# Patient Record
Sex: MALE | Race: WHITE | NOT HISPANIC OR LATINO | Employment: OTHER | URBAN - NONMETROPOLITAN AREA
[De-identification: names, ages, dates, MRNs, and addresses within clinical notes are randomized per-mention and may not be internally consistent; named-entity substitution may affect disease eponyms.]

---

## 2018-01-02 ENCOUNTER — OFFICE VISIT CONVERTED (OUTPATIENT)
Dept: FAMILY MEDICINE CLINIC | Facility: CLINIC | Age: 83
End: 2018-01-02
Attending: NURSE PRACTITIONER

## 2018-01-02 ENCOUNTER — CONVERSION ENCOUNTER (OUTPATIENT)
Dept: FAMILY MEDICINE CLINIC | Facility: CLINIC | Age: 83
End: 2018-01-02

## 2018-02-06 ENCOUNTER — OFFICE VISIT CONVERTED (OUTPATIENT)
Dept: PODIATRY | Facility: CLINIC | Age: 83
End: 2018-02-06
Attending: PODIATRIST

## 2018-02-22 ENCOUNTER — OFFICE VISIT CONVERTED (OUTPATIENT)
Dept: FAMILY MEDICINE CLINIC | Facility: CLINIC | Age: 83
End: 2018-02-22
Attending: NURSE PRACTITIONER

## 2018-02-28 ENCOUNTER — OFFICE VISIT CONVERTED (OUTPATIENT)
Dept: CARDIOLOGY | Facility: CLINIC | Age: 83
End: 2018-02-28
Attending: INTERNAL MEDICINE

## 2018-04-26 ENCOUNTER — OFFICE VISIT CONVERTED (OUTPATIENT)
Dept: FAMILY MEDICINE CLINIC | Facility: CLINIC | Age: 83
End: 2018-04-26
Attending: NURSE PRACTITIONER

## 2018-04-26 ENCOUNTER — CONVERSION ENCOUNTER (OUTPATIENT)
Dept: FAMILY MEDICINE CLINIC | Facility: CLINIC | Age: 83
End: 2018-04-26

## 2018-05-01 ENCOUNTER — CONVERSION ENCOUNTER (OUTPATIENT)
Dept: PODIATRY | Facility: CLINIC | Age: 83
End: 2018-05-01

## 2018-05-01 ENCOUNTER — PROCEDURE VISIT CONVERTED (OUTPATIENT)
Dept: PODIATRY | Facility: CLINIC | Age: 83
End: 2018-05-01
Attending: PODIATRIST

## 2018-05-03 ENCOUNTER — OFFICE VISIT CONVERTED (OUTPATIENT)
Dept: FAMILY MEDICINE CLINIC | Facility: CLINIC | Age: 83
End: 2018-05-03
Attending: NURSE PRACTITIONER

## 2018-05-14 ENCOUNTER — OFFICE VISIT CONVERTED (OUTPATIENT)
Dept: FAMILY MEDICINE CLINIC | Facility: CLINIC | Age: 83
End: 2018-05-14
Attending: NURSE PRACTITIONER

## 2018-05-21 ENCOUNTER — OFFICE VISIT CONVERTED (OUTPATIENT)
Dept: FAMILY MEDICINE CLINIC | Facility: CLINIC | Age: 83
End: 2018-05-21
Attending: NURSE PRACTITIONER

## 2018-05-21 ENCOUNTER — CONVERSION ENCOUNTER (OUTPATIENT)
Dept: FAMILY MEDICINE CLINIC | Facility: CLINIC | Age: 83
End: 2018-05-21

## 2018-06-18 ENCOUNTER — OFFICE VISIT CONVERTED (OUTPATIENT)
Dept: FAMILY MEDICINE CLINIC | Facility: CLINIC | Age: 83
End: 2018-06-18
Attending: NURSE PRACTITIONER

## 2018-07-02 ENCOUNTER — OFFICE VISIT CONVERTED (OUTPATIENT)
Dept: FAMILY MEDICINE CLINIC | Facility: CLINIC | Age: 83
End: 2018-07-02
Attending: NURSE PRACTITIONER

## 2018-07-09 ENCOUNTER — CONVERSION ENCOUNTER (OUTPATIENT)
Dept: FAMILY MEDICINE CLINIC | Facility: CLINIC | Age: 83
End: 2018-07-09

## 2018-07-09 ENCOUNTER — OFFICE VISIT CONVERTED (OUTPATIENT)
Dept: FAMILY MEDICINE CLINIC | Facility: CLINIC | Age: 83
End: 2018-07-09
Attending: NURSE PRACTITIONER

## 2018-07-30 ENCOUNTER — OFFICE VISIT CONVERTED (OUTPATIENT)
Dept: CARDIOLOGY | Facility: CLINIC | Age: 83
End: 2018-07-30
Attending: INTERNAL MEDICINE

## 2018-09-19 ENCOUNTER — OFFICE VISIT CONVERTED (OUTPATIENT)
Dept: UROLOGY | Facility: CLINIC | Age: 83
End: 2018-09-19
Attending: UROLOGY

## 2018-10-04 ENCOUNTER — CONVERSION ENCOUNTER (OUTPATIENT)
Dept: CARDIOLOGY | Facility: CLINIC | Age: 83
End: 2018-10-04

## 2018-10-04 ENCOUNTER — OFFICE VISIT CONVERTED (OUTPATIENT)
Dept: CARDIOLOGY | Facility: CLINIC | Age: 83
End: 2018-10-04
Attending: NURSE PRACTITIONER

## 2018-10-29 ENCOUNTER — OFFICE VISIT CONVERTED (OUTPATIENT)
Dept: SURGERY | Facility: CLINIC | Age: 83
End: 2018-10-29
Attending: PHYSICIAN ASSISTANT

## 2018-10-29 ENCOUNTER — CONVERSION ENCOUNTER (OUTPATIENT)
Dept: SURGERY | Facility: CLINIC | Age: 83
End: 2018-10-29

## 2018-11-01 ENCOUNTER — CONVERSION ENCOUNTER (OUTPATIENT)
Dept: CARDIOLOGY | Facility: CLINIC | Age: 83
End: 2018-11-01
Attending: INTERNAL MEDICINE

## 2018-11-14 ENCOUNTER — OFFICE VISIT CONVERTED (OUTPATIENT)
Dept: FAMILY MEDICINE CLINIC | Facility: CLINIC | Age: 83
End: 2018-11-14
Attending: NURSE PRACTITIONER

## 2018-11-19 ENCOUNTER — OFFICE VISIT CONVERTED (OUTPATIENT)
Dept: SURGERY | Facility: CLINIC | Age: 83
End: 2018-11-19
Attending: PHYSICIAN ASSISTANT

## 2018-11-19 ENCOUNTER — CONVERSION ENCOUNTER (OUTPATIENT)
Dept: SURGERY | Facility: CLINIC | Age: 83
End: 2018-11-19

## 2018-11-20 ENCOUNTER — CONVERSION ENCOUNTER (OUTPATIENT)
Dept: MAMMOGRAPHY | Facility: HOSPITAL | Age: 83
End: 2018-11-20

## 2018-12-18 ENCOUNTER — OFFICE VISIT CONVERTED (OUTPATIENT)
Dept: FAMILY MEDICINE CLINIC | Facility: CLINIC | Age: 83
End: 2018-12-18
Attending: NURSE PRACTITIONER

## 2018-12-18 ENCOUNTER — CONVERSION ENCOUNTER (OUTPATIENT)
Dept: FAMILY MEDICINE CLINIC | Facility: CLINIC | Age: 83
End: 2018-12-18

## 2019-01-22 ENCOUNTER — CONVERSION ENCOUNTER (OUTPATIENT)
Dept: SURGERY | Facility: CLINIC | Age: 84
End: 2019-01-22

## 2019-01-22 ENCOUNTER — HOSPITAL ENCOUNTER (OUTPATIENT)
Dept: SURGERY | Facility: CLINIC | Age: 84
Discharge: HOME | End: 2019-01-22
Attending: PHYSICIAN ASSISTANT

## 2019-01-22 ENCOUNTER — OFFICE VISIT CONVERTED (OUTPATIENT)
Dept: SURGERY | Facility: CLINIC | Age: 84
End: 2019-01-22
Attending: PHYSICIAN ASSISTANT

## 2019-01-24 ENCOUNTER — PROCEDURE VISIT CONVERTED (OUTPATIENT)
Dept: PODIATRY | Facility: CLINIC | Age: 84
End: 2019-01-24
Attending: PODIATRIST

## 2019-01-24 LAB — BACTERIA UR CULT: NORMAL

## 2019-02-06 ENCOUNTER — HOSPITAL ENCOUNTER (OUTPATIENT)
Dept: OTHER | Facility: HOSPITAL | Age: 84
Discharge: HOME | End: 2019-02-06
Attending: PHYSICIAN ASSISTANT

## 2019-02-06 LAB
APPEARANCE UR: CLEAR
BILIRUB UR QL: NEGATIVE
COLOR UR: YELLOW
CONV BACTERIA: NEGATIVE
CONV COLLECTION SOURCE (UA): ABNORMAL
CONV UROBILINOGEN IN URINE BY AUTOMATED TEST STRIP: 0.2 {EHRLICHU}/DL (ref 0.1–1)
GLUCOSE UR QL: NEGATIVE MG/DL
HGB UR QL STRIP: ABNORMAL
KETONES UR QL STRIP: NEGATIVE MG/DL
LEUKOCYTE ESTERASE UR QL STRIP: NEGATIVE
NITRITE UR QL STRIP: NEGATIVE
PH UR STRIP.AUTO: 6 [PH] (ref 5–8)
PROT UR QL: 30 MG/DL
RBC #/AREA URNS HPF: ABNORMAL /[HPF]
SP GR UR: 1.02 (ref 1–1.03)
SQUAMOUS SPT QL MICRO: ABNORMAL /[HPF]
WBC #/AREA URNS HPF: ABNORMAL /[HPF]

## 2019-02-12 ENCOUNTER — HOSPITAL ENCOUNTER (OUTPATIENT)
Dept: OTHER | Facility: HOSPITAL | Age: 84
Discharge: HOME | End: 2019-02-12
Attending: INTERNAL MEDICINE

## 2019-02-12 LAB
25(OH)D3 SERPL-MCNC: 38.9 NG/ML (ref 30–100)
CALCIUM SERPL-MCNC: 9 MG/DL (ref 8.7–10.4)
CREAT UR-MCNC: 0.9 MG/DL (ref 0.7–1.2)

## 2019-02-13 ENCOUNTER — OFFICE VISIT CONVERTED (OUTPATIENT)
Dept: FAMILY MEDICINE CLINIC | Facility: CLINIC | Age: 84
End: 2019-02-13
Attending: FAMILY MEDICINE

## 2019-02-13 ENCOUNTER — CONVERSION ENCOUNTER (OUTPATIENT)
Dept: FAMILY MEDICINE CLINIC | Facility: CLINIC | Age: 84
End: 2019-02-13

## 2019-03-14 ENCOUNTER — OFFICE VISIT CONVERTED (OUTPATIENT)
Dept: FAMILY MEDICINE CLINIC | Facility: CLINIC | Age: 84
End: 2019-03-14
Attending: FAMILY MEDICINE

## 2019-04-11 ENCOUNTER — CONVERSION ENCOUNTER (OUTPATIENT)
Dept: SURGERY | Facility: CLINIC | Age: 84
End: 2019-04-11

## 2019-04-11 ENCOUNTER — HOSPITAL ENCOUNTER (OUTPATIENT)
Dept: SURGERY | Facility: CLINIC | Age: 84
Discharge: HOME | End: 2019-04-11
Attending: PHYSICIAN ASSISTANT

## 2019-04-11 ENCOUNTER — PROCEDURE VISIT CONVERTED (OUTPATIENT)
Dept: PODIATRY | Facility: CLINIC | Age: 84
End: 2019-04-11
Attending: PODIATRIST

## 2019-04-11 ENCOUNTER — OFFICE VISIT CONVERTED (OUTPATIENT)
Dept: SURGERY | Facility: CLINIC | Age: 84
End: 2019-04-11
Attending: PHYSICIAN ASSISTANT

## 2019-04-13 LAB — BACTERIA UR CULT: NORMAL

## 2019-04-25 ENCOUNTER — HOSPITAL ENCOUNTER (OUTPATIENT)
Dept: GENERAL RADIOLOGY | Facility: HOSPITAL | Age: 84
Discharge: HOME | End: 2019-04-25
Attending: PHYSICIAN ASSISTANT

## 2019-04-25 LAB
CREAT BLD-MCNC: 1 MG/DL (ref 0.6–1.4)
GFR SERPLBLD BASED ON 1.73 SQ M-ARVRAT: >60 ML/MIN/{1.73_M2}

## 2019-04-29 ENCOUNTER — HOSPITAL ENCOUNTER (OUTPATIENT)
Dept: OTHER | Facility: HOSPITAL | Age: 84
Discharge: HOME | End: 2019-04-29
Attending: NURSE PRACTITIONER

## 2019-04-29 LAB
ALBUMIN SERPL-MCNC: 4.1 G/DL (ref 3.5–5)
ALBUMIN/GLOB SERPL: 1.7 {RATIO} (ref 1.4–2.6)
ALP SERPL-CCNC: 50 U/L (ref 38–185)
ALT SERPL-CCNC: 8 U/L (ref 10–40)
ANION GAP SERPL CALC-SCNC: 16 MMOL/L (ref 8–19)
AST SERPL-CCNC: 22 U/L (ref 15–50)
BASOPHILS # BLD AUTO: 0.07 10*3/UL (ref 0–0.2)
BASOPHILS NFR BLD AUTO: 0.9 % (ref 0–3)
BILIRUB SERPL-MCNC: 0.63 MG/DL (ref 0.2–1.3)
BUN SERPL-MCNC: 24 MG/DL (ref 5–25)
BUN/CREAT SERPL: 25 {RATIO} (ref 6–20)
CALCIUM SERPL-MCNC: 9.1 MG/DL (ref 8.7–10.4)
CHLORIDE SERPL-SCNC: 107 MMOL/L (ref 99–111)
CONV ABS IMM GRAN: 0.02 10*3/UL (ref 0–0.2)
CONV CO2: 26 MMOL/L (ref 22–32)
CONV IMMATURE GRAN: 0.3 % (ref 0–1.8)
CONV TOTAL PROTEIN: 6.5 G/DL (ref 6.3–8.2)
CREAT UR-MCNC: 0.97 MG/DL (ref 0.7–1.2)
DEPRECATED RDW RBC AUTO: 53.5 FL (ref 35.1–43.9)
EOSINOPHIL # BLD AUTO: 0.29 10*3/UL (ref 0–0.7)
EOSINOPHIL # BLD AUTO: 3.8 % (ref 0–7)
ERYTHROCYTE [DISTWIDTH] IN BLOOD BY AUTOMATED COUNT: 14.6 % (ref 11.6–14.4)
GFR SERPLBLD BASED ON 1.73 SQ M-ARVRAT: >60 ML/MIN/{1.73_M2}
GLOBULIN UR ELPH-MCNC: 2.4 G/DL (ref 2–3.5)
GLUCOSE SERPL-MCNC: 103 MG/DL (ref 70–99)
HBA1C MFR BLD: 14.2 G/DL (ref 14–18)
HCT VFR BLD AUTO: 44.8 % (ref 42–52)
LYMPHOCYTES # BLD AUTO: 2.74 10*3/UL (ref 1–5)
MCH RBC QN AUTO: 31.5 PG (ref 27–31)
MCHC RBC AUTO-ENTMCNC: 31.7 G/DL (ref 33–37)
MCV RBC AUTO: 99.3 FL (ref 80–96)
MONOCYTES # BLD AUTO: 1.01 10*3/UL (ref 0.2–1.2)
MONOCYTES NFR BLD AUTO: 13.2 % (ref 3–10)
NEUTROPHILS # BLD AUTO: 3.53 10*3/UL (ref 2–8)
NEUTROPHILS NFR BLD AUTO: 46 % (ref 30–85)
NRBC CBCN: 0 % (ref 0–0.7)
OSMOLALITY SERPL CALC.SUM OF ELEC: 302 MOSM/KG (ref 273–304)
PLATELET # BLD AUTO: 170 10*3/UL (ref 130–400)
PMV BLD AUTO: 11.1 FL (ref 9.4–12.4)
POTASSIUM SERPL-SCNC: 4.9 MMOL/L (ref 3.5–5.3)
RBC # BLD AUTO: 4.51 10*6/UL (ref 4.7–6.1)
SODIUM SERPL-SCNC: 144 MMOL/L (ref 135–147)
VARIANT LYMPHS NFR BLD MANUAL: 35.8 % (ref 20–45)
WBC # BLD AUTO: 7.66 10*3/UL (ref 4.8–10.8)

## 2019-05-09 ENCOUNTER — OFFICE VISIT CONVERTED (OUTPATIENT)
Dept: CARDIOLOGY | Facility: CLINIC | Age: 84
End: 2019-05-09
Attending: INTERNAL MEDICINE

## 2019-05-15 ENCOUNTER — PROCEDURE VISIT CONVERTED (OUTPATIENT)
Dept: UROLOGY | Facility: CLINIC | Age: 84
End: 2019-05-15
Attending: UROLOGY

## 2019-05-30 ENCOUNTER — HOSPITAL ENCOUNTER (OUTPATIENT)
Dept: OTHER | Facility: HOSPITAL | Age: 84
Discharge: HOME | End: 2019-05-30
Attending: INTERNAL MEDICINE

## 2019-05-30 LAB
25(OH)D3 SERPL-MCNC: 40 NG/ML (ref 30–100)
CALCIUM SERPL-MCNC: 8.9 MG/DL (ref 8.7–10.4)
CREAT UR-MCNC: 1.14 MG/DL (ref 0.7–1.2)

## 2019-06-13 ENCOUNTER — HOSPITAL ENCOUNTER (OUTPATIENT)
Dept: INFUSION THERAPY | Facility: HOSPITAL | Age: 84
Discharge: HOME | End: 2019-06-13
Attending: INTERNAL MEDICINE

## 2019-08-07 ENCOUNTER — HOSPITAL ENCOUNTER (OUTPATIENT)
Dept: OTHER | Facility: HOSPITAL | Age: 84
Discharge: HOME | End: 2019-08-07
Attending: INTERNAL MEDICINE

## 2019-08-07 LAB
ANION GAP SERPL CALC-SCNC: 21 MMOL/L (ref 8–19)
BUN SERPL-MCNC: 18 MG/DL (ref 5–25)
BUN/CREAT SERPL: 17 {RATIO} (ref 6–20)
CALCIUM SERPL-MCNC: 9.1 MG/DL (ref 8.7–10.4)
CHLORIDE SERPL-SCNC: 108 MMOL/L (ref 99–111)
CONV CO2: 22 MMOL/L (ref 22–32)
CREAT UR-MCNC: 1.04 MG/DL (ref 0.7–1.2)
GFR SERPLBLD BASED ON 1.73 SQ M-ARVRAT: >60 ML/MIN/{1.73_M2}
GLUCOSE SERPL-MCNC: 103 MG/DL (ref 70–99)
OSMOLALITY SERPL CALC.SUM OF ELEC: 304 MOSM/KG (ref 273–304)
POTASSIUM SERPL-SCNC: 4.6 MMOL/L (ref 3.5–5.3)
SODIUM SERPL-SCNC: 146 MMOL/L (ref 135–147)

## 2019-09-16 ENCOUNTER — HOSPITAL ENCOUNTER (OUTPATIENT)
Dept: OTHER | Facility: HOSPITAL | Age: 84
Discharge: HOME | End: 2019-09-16
Attending: INTERNAL MEDICINE

## 2019-09-16 LAB
25(OH)D3 SERPL-MCNC: 35.8 NG/ML (ref 30–100)
CALCIUM SERPL-MCNC: 9.1 MG/DL (ref 8.7–10.4)

## 2019-09-19 ENCOUNTER — HOSPITAL ENCOUNTER (OUTPATIENT)
Dept: OTHER | Facility: HOSPITAL | Age: 84
Discharge: HOME | End: 2019-09-19
Attending: UROLOGY

## 2019-09-19 LAB — PSA SERPL-MCNC: <0.01 NG/ML (ref 0–4)

## 2019-09-23 ENCOUNTER — PROCEDURE VISIT CONVERTED (OUTPATIENT)
Dept: UROLOGY | Facility: CLINIC | Age: 84
End: 2019-09-23
Attending: UROLOGY

## 2019-09-24 ENCOUNTER — OFFICE VISIT CONVERTED (OUTPATIENT)
Dept: SURGERY | Facility: CLINIC | Age: 84
End: 2019-09-24
Attending: PHYSICIAN ASSISTANT

## 2019-09-24 ENCOUNTER — CONVERSION ENCOUNTER (OUTPATIENT)
Dept: SURGERY | Facility: CLINIC | Age: 84
End: 2019-09-24

## 2019-10-07 ENCOUNTER — OFFICE VISIT CONVERTED (OUTPATIENT)
Dept: FAMILY MEDICINE CLINIC | Facility: CLINIC | Age: 84
End: 2019-10-07
Attending: FAMILY MEDICINE

## 2019-10-07 ENCOUNTER — CONVERSION ENCOUNTER (OUTPATIENT)
Dept: FAMILY MEDICINE CLINIC | Facility: CLINIC | Age: 84
End: 2019-10-07

## 2019-11-18 ENCOUNTER — HOSPITAL ENCOUNTER (OUTPATIENT)
Dept: OTHER | Facility: HOSPITAL | Age: 84
Discharge: HOME | End: 2019-11-18
Attending: NURSE PRACTITIONER

## 2019-11-18 LAB
ANION GAP SERPL CALC-SCNC: 15 MMOL/L (ref 8–19)
BASOPHILS # BLD AUTO: 0.05 10*3/UL (ref 0–0.2)
BASOPHILS NFR BLD AUTO: 0.6 % (ref 0–3)
BUN SERPL-MCNC: 18 MG/DL (ref 5–25)
BUN/CREAT SERPL: 18 {RATIO} (ref 6–20)
CALCIUM SERPL-MCNC: 8.8 MG/DL (ref 8.7–10.4)
CHLORIDE SERPL-SCNC: 101 MMOL/L (ref 99–111)
CONV ABS IMM GRAN: 0.02 10*3/UL (ref 0–0.2)
CONV CO2: 28 MMOL/L (ref 22–32)
CONV IMMATURE GRAN: 0.2 % (ref 0–1.8)
CREAT UR-MCNC: 1.01 MG/DL (ref 0.7–1.2)
DEPRECATED RDW RBC AUTO: 50 FL (ref 35.1–43.9)
EOSINOPHIL # BLD AUTO: 0.24 10*3/UL (ref 0–0.7)
EOSINOPHIL # BLD AUTO: 3 % (ref 0–7)
ERYTHROCYTE [DISTWIDTH] IN BLOOD BY AUTOMATED COUNT: 13.3 % (ref 11.6–14.4)
GFR SERPLBLD BASED ON 1.73 SQ M-ARVRAT: >60 ML/MIN/{1.73_M2}
GLUCOSE SERPL-MCNC: 91 MG/DL (ref 70–99)
HCT VFR BLD AUTO: 47.1 % (ref 42–52)
HGB BLD-MCNC: 14.5 G/DL (ref 14–18)
LYMPHOCYTES # BLD AUTO: 2.95 10*3/UL (ref 1–5)
LYMPHOCYTES NFR BLD AUTO: 36.6 % (ref 20–45)
MCH RBC QN AUTO: 31.3 PG (ref 27–31)
MCHC RBC AUTO-ENTMCNC: 30.8 G/DL (ref 33–37)
MCV RBC AUTO: 101.7 FL (ref 80–96)
MONOCYTES # BLD AUTO: 1.04 10*3/UL (ref 0.2–1.2)
MONOCYTES NFR BLD AUTO: 12.9 % (ref 3–10)
NEUTROPHILS # BLD AUTO: 3.75 10*3/UL (ref 2–8)
NEUTROPHILS NFR BLD AUTO: 46.7 % (ref 30–85)
NRBC CBCN: 0 % (ref 0–0.7)
OSMOLALITY SERPL CALC.SUM OF ELEC: 291 MOSM/KG (ref 273–304)
PLATELET # BLD AUTO: 173 10*3/UL (ref 130–400)
PMV BLD AUTO: 10.2 FL (ref 9.4–12.4)
POTASSIUM SERPL-SCNC: 3.8 MMOL/L (ref 3.5–5.3)
RBC # BLD AUTO: 4.63 10*6/UL (ref 4.7–6.1)
SODIUM SERPL-SCNC: 140 MMOL/L (ref 135–147)
WBC # BLD AUTO: 8.05 10*3/UL (ref 4.8–10.8)

## 2019-11-25 ENCOUNTER — CONVERSION ENCOUNTER (OUTPATIENT)
Dept: CARDIOLOGY | Facility: CLINIC | Age: 84
End: 2019-11-25

## 2019-11-25 ENCOUNTER — OFFICE VISIT CONVERTED (OUTPATIENT)
Dept: CARDIOLOGY | Facility: CLINIC | Age: 84
End: 2019-11-25
Attending: NURSE PRACTITIONER

## 2019-12-07 ENCOUNTER — HOSPITAL ENCOUNTER (OUTPATIENT)
Dept: OTHER | Facility: HOSPITAL | Age: 84
Discharge: HOME | End: 2019-12-07
Attending: INTERNAL MEDICINE

## 2019-12-07 LAB
25(OH)D3 SERPL-MCNC: 33.5 NG/ML (ref 30–100)
CALCIUM SERPL-MCNC: 8.8 MG/DL (ref 8.7–10.4)
CREAT UR-MCNC: 1.11 MG/DL (ref 0.7–1.2)

## 2019-12-10 ENCOUNTER — PROCEDURE VISIT CONVERTED (OUTPATIENT)
Dept: PODIATRY | Facility: CLINIC | Age: 84
End: 2019-12-10
Attending: PODIATRIST

## 2019-12-20 ENCOUNTER — HOSPITAL ENCOUNTER (OUTPATIENT)
Dept: INFUSION THERAPY | Facility: HOSPITAL | Age: 84
Discharge: HOME | End: 2019-12-20
Attending: INTERNAL MEDICINE

## 2020-01-07 ENCOUNTER — CONVERSION ENCOUNTER (OUTPATIENT)
Dept: FAMILY MEDICINE CLINIC | Facility: CLINIC | Age: 85
End: 2020-01-07

## 2020-01-07 ENCOUNTER — OFFICE VISIT CONVERTED (OUTPATIENT)
Dept: FAMILY MEDICINE CLINIC | Facility: CLINIC | Age: 85
End: 2020-01-07
Attending: FAMILY MEDICINE

## 2020-01-10 ENCOUNTER — HOSPITAL ENCOUNTER (OUTPATIENT)
Dept: GENERAL RADIOLOGY | Facility: HOSPITAL | Age: 85
Discharge: HOME | End: 2020-01-10
Attending: FAMILY MEDICINE

## 2020-03-03 ENCOUNTER — PROCEDURE VISIT CONVERTED (OUTPATIENT)
Dept: PODIATRY | Facility: CLINIC | Age: 85
End: 2020-03-03
Attending: PODIATRIST

## 2020-03-03 ENCOUNTER — HOSPITAL ENCOUNTER (OUTPATIENT)
Dept: OTHER | Facility: HOSPITAL | Age: 85
Discharge: HOME | End: 2020-03-03
Attending: INTERNAL MEDICINE

## 2020-03-03 LAB
25(OH)D3 SERPL-MCNC: 37.5 NG/ML (ref 30–100)
CALCIUM SERPL-MCNC: 8.8 MG/DL (ref 8.7–10.4)
CREAT UR-MCNC: 1.24 MG/DL (ref 0.7–1.2)

## 2020-04-13 ENCOUNTER — TELEPHONE CONVERTED (OUTPATIENT)
Dept: FAMILY MEDICINE CLINIC | Facility: CLINIC | Age: 85
End: 2020-04-13
Attending: FAMILY MEDICINE

## 2020-04-13 ENCOUNTER — CONVERSION ENCOUNTER (OUTPATIENT)
Dept: FAMILY MEDICINE CLINIC | Facility: CLINIC | Age: 85
End: 2020-04-13

## 2020-05-22 ENCOUNTER — HOSPITAL ENCOUNTER (OUTPATIENT)
Dept: OTHER | Facility: HOSPITAL | Age: 85
Discharge: HOME | End: 2020-05-22
Attending: NURSE PRACTITIONER

## 2020-05-22 LAB
ALBUMIN SERPL-MCNC: 3.8 G/DL (ref 3.5–5)
ALBUMIN/GLOB SERPL: 1.4 {RATIO} (ref 1.4–2.6)
ALP SERPL-CCNC: 58 U/L (ref 38–185)
ALT SERPL-CCNC: 9 U/L (ref 10–40)
ANION GAP SERPL CALC-SCNC: 15 MMOL/L (ref 8–19)
AST SERPL-CCNC: 23 U/L (ref 15–50)
BASOPHILS # BLD AUTO: 0.06 10*3/UL (ref 0–0.2)
BASOPHILS NFR BLD AUTO: 0.7 % (ref 0–3)
BILIRUB SERPL-MCNC: 0.43 MG/DL (ref 0.2–1.3)
BUN SERPL-MCNC: 18 MG/DL (ref 5–25)
BUN/CREAT SERPL: 15 {RATIO} (ref 6–20)
CALCIUM SERPL-MCNC: 9.2 MG/DL (ref 8.7–10.4)
CHLORIDE SERPL-SCNC: 106 MMOL/L (ref 99–111)
CHOLEST SERPL-MCNC: 122 MG/DL (ref 107–200)
CHOLEST/HDLC SERPL: 2.7 {RATIO} (ref 3–6)
CONV ABS IMM GRAN: 0.02 10*3/UL (ref 0–0.2)
CONV CO2: 26 MMOL/L (ref 22–32)
CONV IMMATURE GRAN: 0.2 % (ref 0–1.8)
CONV TOTAL PROTEIN: 6.5 G/DL (ref 6.3–8.2)
CREAT UR-MCNC: 1.22 MG/DL (ref 0.7–1.2)
DEPRECATED RDW RBC AUTO: 49.3 FL (ref 35.1–43.9)
EOSINOPHIL # BLD AUTO: 0.29 10*3/UL (ref 0–0.7)
EOSINOPHIL # BLD AUTO: 3.5 % (ref 0–7)
ERYTHROCYTE [DISTWIDTH] IN BLOOD BY AUTOMATED COUNT: 13.5 % (ref 11.6–14.4)
GFR SERPLBLD BASED ON 1.73 SQ M-ARVRAT: 51 ML/MIN/{1.73_M2}
GLOBULIN UR ELPH-MCNC: 2.7 G/DL (ref 2–3.5)
GLUCOSE SERPL-MCNC: 100 MG/DL (ref 70–99)
HCT VFR BLD AUTO: 46 % (ref 42–52)
HDLC SERPL-MCNC: 46 MG/DL (ref 40–60)
HGB BLD-MCNC: 14.6 G/DL (ref 14–18)
LDLC SERPL CALC-MCNC: 65 MG/DL (ref 70–100)
LYMPHOCYTES # BLD AUTO: 2.44 10*3/UL (ref 1–5)
LYMPHOCYTES NFR BLD AUTO: 29.7 % (ref 20–45)
MCH RBC QN AUTO: 31 PG (ref 27–31)
MCHC RBC AUTO-ENTMCNC: 31.7 G/DL (ref 33–37)
MCV RBC AUTO: 97.7 FL (ref 80–96)
MONOCYTES # BLD AUTO: 1 10*3/UL (ref 0.2–1.2)
MONOCYTES NFR BLD AUTO: 12.2 % (ref 3–10)
NEUTROPHILS # BLD AUTO: 4.41 10*3/UL (ref 2–8)
NEUTROPHILS NFR BLD AUTO: 53.7 % (ref 30–85)
NRBC CBCN: 0 % (ref 0–0.7)
OSMOLALITY SERPL CALC.SUM OF ELEC: 298 MOSM/KG (ref 273–304)
PLATELET # BLD AUTO: 168 10*3/UL (ref 130–400)
PMV BLD AUTO: 10.2 FL (ref 9.4–12.4)
POTASSIUM SERPL-SCNC: 4.4 MMOL/L (ref 3.5–5.3)
RBC # BLD AUTO: 4.71 10*6/UL (ref 4.7–6.1)
SODIUM SERPL-SCNC: 143 MMOL/L (ref 135–147)
TRIGL SERPL-MCNC: 57 MG/DL (ref 40–150)
VLDLC SERPL-MCNC: 11 MG/DL (ref 5–37)
WBC # BLD AUTO: 8.22 10*3/UL (ref 4.8–10.8)

## 2020-06-03 ENCOUNTER — CONVERSION ENCOUNTER (OUTPATIENT)
Dept: PODIATRY | Facility: CLINIC | Age: 85
End: 2020-06-03

## 2020-06-03 ENCOUNTER — PROCEDURE VISIT CONVERTED (OUTPATIENT)
Dept: PODIATRY | Facility: CLINIC | Age: 85
End: 2020-06-03
Attending: PODIATRIST

## 2020-06-11 ENCOUNTER — OFFICE VISIT CONVERTED (OUTPATIENT)
Dept: CARDIOLOGY | Facility: CLINIC | Age: 85
End: 2020-06-11
Attending: INTERNAL MEDICINE

## 2020-06-16 ENCOUNTER — HOSPITAL ENCOUNTER (OUTPATIENT)
Dept: OTHER | Facility: HOSPITAL | Age: 85
Discharge: HOME | End: 2020-06-16
Attending: INTERNAL MEDICINE

## 2020-06-16 LAB
25(OH)D3 SERPL-MCNC: 41.4 NG/ML (ref 30–100)
CALCIUM SERPL-MCNC: 9.1 MG/DL (ref 8.7–10.4)
CREAT UR-MCNC: 1.22 MG/DL (ref 0.7–1.2)

## 2020-07-06 ENCOUNTER — HOSPITAL ENCOUNTER (OUTPATIENT)
Dept: INFUSION THERAPY | Facility: HOSPITAL | Age: 85
Discharge: HOME | End: 2020-07-06
Attending: INTERNAL MEDICINE

## 2020-07-08 ENCOUNTER — HOSPITAL ENCOUNTER (OUTPATIENT)
Dept: OTHER | Facility: HOSPITAL | Age: 85
Discharge: HOME | End: 2020-07-08
Attending: NURSE PRACTITIONER

## 2020-07-08 LAB
ANION GAP SERPL CALC-SCNC: 12 MMOL/L (ref 8–19)
BUN SERPL-MCNC: 21 MG/DL (ref 5–25)
BUN/CREAT SERPL: 17 {RATIO} (ref 6–20)
CALCIUM SERPL-MCNC: 8.8 MG/DL (ref 8.7–10.4)
CHLORIDE SERPL-SCNC: 104 MMOL/L (ref 99–111)
CONV CO2: 27 MMOL/L (ref 22–32)
CREAT UR-MCNC: 1.25 MG/DL (ref 0.7–1.2)
GFR SERPLBLD BASED ON 1.73 SQ M-ARVRAT: 49 ML/MIN/{1.73_M2}
GLUCOSE SERPL-MCNC: 96 MG/DL (ref 70–99)
OSMOLALITY SERPL CALC.SUM OF ELEC: 291 MOSM/KG (ref 273–304)
POTASSIUM SERPL-SCNC: 4.3 MMOL/L (ref 3.5–5.3)
SODIUM SERPL-SCNC: 139 MMOL/L (ref 135–147)

## 2020-08-26 ENCOUNTER — PROCEDURE VISIT CONVERTED (OUTPATIENT)
Dept: PODIATRY | Facility: CLINIC | Age: 85
End: 2020-08-26
Attending: PODIATRIST

## 2020-08-28 ENCOUNTER — HOSPITAL ENCOUNTER (OUTPATIENT)
Dept: RADIATION ONCOLOGY | Facility: HOSPITAL | Age: 85
Discharge: HOME | End: 2020-08-28
Attending: RADIOLOGY

## 2020-08-28 LAB — PSA SERPL-MCNC: <0.01 NG/ML (ref 0–4)

## 2020-09-08 ENCOUNTER — OFFICE VISIT CONVERTED (OUTPATIENT)
Dept: FAMILY MEDICINE CLINIC | Facility: CLINIC | Age: 85
End: 2020-09-08
Attending: FAMILY MEDICINE

## 2020-09-08 ENCOUNTER — CONVERSION ENCOUNTER (OUTPATIENT)
Dept: FAMILY MEDICINE CLINIC | Facility: CLINIC | Age: 85
End: 2020-09-08

## 2020-09-21 ENCOUNTER — HOSPITAL ENCOUNTER (OUTPATIENT)
Dept: OTHER | Facility: HOSPITAL | Age: 85
Discharge: HOME | End: 2020-09-21
Attending: PHYSICIAN ASSISTANT

## 2020-09-21 LAB — PSA SERPL-MCNC: <0.01 NG/ML (ref 0–4)

## 2020-09-29 ENCOUNTER — TELEPHONE CONVERTED (OUTPATIENT)
Dept: UROLOGY | Facility: CLINIC | Age: 85
End: 2020-09-29
Attending: UROLOGY

## 2020-10-01 ENCOUNTER — HOSPITAL ENCOUNTER (OUTPATIENT)
Dept: SURGERY | Facility: CLINIC | Age: 85
Discharge: HOME | End: 2020-10-01
Attending: UROLOGY

## 2020-10-01 LAB
APPEARANCE UR: CLEAR
BILIRUB UR QL: NEGATIVE
COLOR UR: YELLOW
CONV COLLECTION SOURCE (UA): NORMAL
CONV UROBILINOGEN IN URINE BY AUTOMATED TEST STRIP: 0.2 {EHRLICHU}/DL (ref 0.1–1)
GLUCOSE UR QL: NEGATIVE MG/DL
HGB UR QL STRIP: NEGATIVE
KETONES UR QL STRIP: NEGATIVE MG/DL
LEUKOCYTE ESTERASE UR QL STRIP: NEGATIVE
NITRITE UR QL STRIP: NEGATIVE
PH UR STRIP.AUTO: 6 [PH] (ref 5–8)
PROT UR QL: NEGATIVE MG/DL
SP GR UR: 1.02 (ref 1–1.03)

## 2020-11-18 ENCOUNTER — CONVERSION ENCOUNTER (OUTPATIENT)
Dept: PODIATRY | Facility: CLINIC | Age: 85
End: 2020-11-18

## 2020-11-18 ENCOUNTER — PROCEDURE VISIT CONVERTED (OUTPATIENT)
Dept: PODIATRY | Facility: CLINIC | Age: 85
End: 2020-11-18
Attending: PODIATRIST

## 2020-11-23 ENCOUNTER — HOSPITAL ENCOUNTER (OUTPATIENT)
Dept: MAMMOGRAPHY | Facility: HOSPITAL | Age: 85
Discharge: HOME | End: 2020-11-23
Attending: INTERNAL MEDICINE

## 2021-01-02 ENCOUNTER — HOSPITAL ENCOUNTER (OUTPATIENT)
Dept: OTHER | Facility: HOSPITAL | Age: 86
Discharge: HOME | End: 2021-01-02
Attending: FAMILY MEDICINE

## 2021-01-02 LAB
ALBUMIN SERPL-MCNC: 3.8 G/DL (ref 3.5–5)
ALBUMIN/GLOB SERPL: 1.4 {RATIO} (ref 1.4–2.6)
ALP SERPL-CCNC: 65 U/L (ref 38–185)
ALT SERPL-CCNC: 10 U/L (ref 10–40)
ANION GAP SERPL CALC-SCNC: 16 MMOL/L (ref 8–19)
AST SERPL-CCNC: 23 U/L (ref 15–50)
BASOPHILS # BLD AUTO: 0.08 10*3/UL (ref 0–0.2)
BASOPHILS NFR BLD AUTO: 1 % (ref 0–3)
BILIRUB SERPL-MCNC: 0.37 MG/DL (ref 0.2–1.3)
BUN SERPL-MCNC: 20 MG/DL (ref 5–25)
BUN/CREAT SERPL: 16 {RATIO} (ref 6–20)
CALCIUM SERPL-MCNC: 9.4 MG/DL (ref 8.7–10.4)
CHLORIDE SERPL-SCNC: 105 MMOL/L (ref 99–111)
CHOLEST SERPL-MCNC: 129 MG/DL (ref 107–200)
CHOLEST/HDLC SERPL: 2.6 {RATIO} (ref 3–6)
CONV ABS IMM GRAN: 0.02 10*3/UL (ref 0–0.2)
CONV CO2: 27 MMOL/L (ref 22–32)
CONV IMMATURE GRAN: 0.3 % (ref 0–1.8)
CONV TOTAL PROTEIN: 6.6 G/DL (ref 6.3–8.2)
CREAT UR-MCNC: 1.27 MG/DL (ref 0.7–1.2)
DEPRECATED RDW RBC AUTO: 50.2 FL (ref 35.1–43.9)
EOSINOPHIL # BLD AUTO: 0.28 10*3/UL (ref 0–0.7)
EOSINOPHIL # BLD AUTO: 3.5 % (ref 0–7)
ERYTHROCYTE [DISTWIDTH] IN BLOOD BY AUTOMATED COUNT: 14 % (ref 11.6–14.4)
GFR SERPLBLD BASED ON 1.73 SQ M-ARVRAT: 48 ML/MIN/{1.73_M2}
GLOBULIN UR ELPH-MCNC: 2.8 G/DL (ref 2–3.5)
GLUCOSE SERPL-MCNC: 111 MG/DL (ref 70–99)
HCT VFR BLD AUTO: 45.6 % (ref 42–52)
HDLC SERPL-MCNC: 50 MG/DL (ref 40–60)
HGB BLD-MCNC: 14.6 G/DL (ref 14–18)
LDLC SERPL CALC-MCNC: 68 MG/DL (ref 70–100)
LYMPHOCYTES # BLD AUTO: 2.71 10*3/UL (ref 1–5)
LYMPHOCYTES NFR BLD AUTO: 33.9 % (ref 20–45)
MCH RBC QN AUTO: 31.1 PG (ref 27–31)
MCHC RBC AUTO-ENTMCNC: 32 G/DL (ref 33–37)
MCV RBC AUTO: 97.2 FL (ref 80–96)
MONOCYTES # BLD AUTO: 1.01 10*3/UL (ref 0.2–1.2)
MONOCYTES NFR BLD AUTO: 12.6 % (ref 3–10)
NEUTROPHILS # BLD AUTO: 3.9 10*3/UL (ref 2–8)
NEUTROPHILS NFR BLD AUTO: 48.7 % (ref 30–85)
NRBC CBCN: 0 % (ref 0–0.7)
OSMOLALITY SERPL CALC.SUM OF ELEC: 301 MOSM/KG (ref 273–304)
PLATELET # BLD AUTO: 181 10*3/UL (ref 130–400)
PMV BLD AUTO: 10.1 FL (ref 9.4–12.4)
POTASSIUM SERPL-SCNC: 3.9 MMOL/L (ref 3.5–5.3)
RBC # BLD AUTO: 4.69 10*6/UL (ref 4.7–6.1)
SODIUM SERPL-SCNC: 144 MMOL/L (ref 135–147)
TRIGL SERPL-MCNC: 53 MG/DL (ref 40–150)
VLDLC SERPL-MCNC: 11 MG/DL (ref 5–37)
WBC # BLD AUTO: 8 10*3/UL (ref 4.8–10.8)

## 2021-01-09 ENCOUNTER — HOSPITAL ENCOUNTER (OUTPATIENT)
Dept: OTHER | Facility: HOSPITAL | Age: 86
Discharge: HOME | End: 2021-01-09
Attending: INTERNAL MEDICINE

## 2021-01-09 LAB
25(OH)D3 SERPL-MCNC: 46 NG/ML (ref 30–100)
CALCIUM SERPL-MCNC: 9.3 MG/DL (ref 8.7–10.4)
CREAT UR-MCNC: 1.08 MG/DL (ref 0.7–1.2)

## 2021-01-11 ENCOUNTER — CONVERSION ENCOUNTER (OUTPATIENT)
Dept: CARDIOLOGY | Facility: CLINIC | Age: 86
End: 2021-01-11

## 2021-01-11 ENCOUNTER — OFFICE VISIT CONVERTED (OUTPATIENT)
Dept: CARDIOLOGY | Facility: CLINIC | Age: 86
End: 2021-01-11
Attending: INTERNAL MEDICINE

## 2021-01-26 ENCOUNTER — HOSPITAL ENCOUNTER (OUTPATIENT)
Dept: INFUSION THERAPY | Facility: HOSPITAL | Age: 86
Discharge: HOME | End: 2021-01-26
Attending: INTERNAL MEDICINE

## 2021-02-02 ENCOUNTER — HOSPITAL ENCOUNTER (OUTPATIENT)
Dept: OTHER | Facility: HOSPITAL | Age: 86
Discharge: HOME | End: 2021-02-02
Attending: INTERNAL MEDICINE

## 2021-02-02 LAB
ANION GAP SERPL CALC-SCNC: 16 MMOL/L (ref 8–19)
BUN SERPL-MCNC: 16 MG/DL (ref 5–25)
BUN/CREAT SERPL: 14 {RATIO} (ref 6–20)
CALCIUM SERPL-MCNC: 9 MG/DL (ref 8.7–10.4)
CHLORIDE SERPL-SCNC: 106 MMOL/L (ref 99–111)
CONV CO2: 26 MMOL/L (ref 22–32)
CREAT UR-MCNC: 1.16 MG/DL (ref 0.7–1.2)
GFR SERPLBLD BASED ON 1.73 SQ M-ARVRAT: 54 ML/MIN/{1.73_M2}
GLUCOSE SERPL-MCNC: 102 MG/DL (ref 70–99)
OSMOLALITY SERPL CALC.SUM OF ELEC: 299 MOSM/KG (ref 273–304)
POTASSIUM SERPL-SCNC: 4.3 MMOL/L (ref 3.5–5.3)
SODIUM SERPL-SCNC: 144 MMOL/L (ref 135–147)

## 2021-02-24 ENCOUNTER — PROCEDURE VISIT CONVERTED (OUTPATIENT)
Dept: PODIATRY | Facility: CLINIC | Age: 86
End: 2021-02-24
Attending: PODIATRIST

## 2021-03-08 ENCOUNTER — CONVERSION ENCOUNTER (OUTPATIENT)
Dept: FAMILY MEDICINE CLINIC | Facility: CLINIC | Age: 86
End: 2021-03-08

## 2021-03-08 ENCOUNTER — OFFICE VISIT CONVERTED (OUTPATIENT)
Dept: FAMILY MEDICINE CLINIC | Facility: CLINIC | Age: 86
End: 2021-03-08
Attending: FAMILY MEDICINE

## 2021-05-12 NOTE — PROGRESS NOTES
Quick Note      Patient Name: Vladimir Carter   Patient ID: [id number]   Sex: Male   YOB: 1927    Primary Care Provider: Mango Kumar DO   Referring Provider: Mango Kumar DO    Visit Date: April 13, 2020    Provider: Mango Kumar DO   Location: [facility]   Location Address: [address] [place] [address] [address]  [place], [place]  [id number]   Location Phone: [phone]          History Of Present Illness  TELEHEALTH VISIT  Chief Complaint: F/U chronic conditions, constipation   Vladimir Carter is a 89 year old /White male who is presenting for evaluation via telehealth visit. Verbal consent obtained before beginning visit.   Provider spent 10 minutes with the patient during telehealth visit.   The following staff were present during this visit: Mango Kumar DO, Tiffany Bazan LPN   Past Medical History/Overview of Patient Symptoms     **I attempted to call patients daughter Tanisha at [phone] and no answer but I left a voicemail for her or her father to return call to office regarding Telehealth phone visit today (4-).    Patient caregiver called back to clinic with patient at bedside to review chronic conditions including Anticoagulant on long-term anticoagulation CKD HTN pulmonary hypertension and constipation.    He is dong ok overall, last labs 11/2019 stable kidney functions blood counts, PSA low and glucose in normal range. No short of breath chest pain fever sick contactst or concerns, just some issues with constipation his regimen is not working at home.            Vitals  Date Time BP Position Site L\R Cuff Size HR RR TEMP (F) WT  HT  BMI kg/m2 BSA m2 O2 Sat HC       04/13/2020 09:06 /63 Sitting       203lbs 0oz                  Assessment  · CAD (coronary artery disease)     414.00/I25.10  · Lupus anticoagulant disorder     289.81/D68.62  · HTN (hypertension)     401.9/I10  · Pulmonary HTN     416.8/I27.2  · Severe tricuspid  regurgitation     397.0/I07.1  ECHO (TTE) Diastolic dysfunction, Pulmonary HTN and Severe Tricuspid regurgitation.  · Constipation     564.00/K59.00         Pulmonary hypertension  HTN  Long-term anticoagulation lupus anticoagulant  *Stable  Continue Eliquis Lasix losartan amlodipine and follow-up with cardiology  Overall stable symptoms  Last labs 11/2019 follow-up with cardiology on monitoring closely but stable    Constipation  *Uncontrolled  We will give samples of amitiza in clinic he can  for now  Can continue MiraLAX over-the-counter and follow-up if no improvement    Refill medicines to [place] as needed, change vitamin D calcium to higher dose to take daily and follow-up in 3 months sooner if concerns reviewed all current conditions labs       Plan  · Orders  o Physican Telephone evaluation, 5-10 min (57665) - 401.9/I10, 397.0/I07.1, 416.8/I27.2 - 04/29/2020  · Medications  o Amitiza 8 mcg oral capsule   SIG: take 1 capsule (8 mcg) by oral route 1 times per day as needed for constipation   DISP: (8) capsules with 0 refills  Prescribed on 04/13/2020     o Calcium 600 + D(3) 600 mg(1,500mg) -400 unit oral tablet   SIG: take 2 tablets by oral route daily for 90 days   DISP: (180) tablets with 3 refills  Adjusted on 04/13/2020     o Lasix 20 mg oral tablet   SIG: take 1 tablet (20 mg) by oral route 2 times per day for 90 days   DISP: (180) tablet with 1 refills  Refilled on 04/13/2020     o Medications have been Reconciled  · Instructions  o Plan Of Care:   o Chronic conditions reviewed and taken into consideration for today's treatment plan.  o Patient instructed to seek medical attention urgently for new or worsening symptoms.  o Call the office with any concerns or questions.  o Risks, benefits, and alternatives were discussed with the patient. The patient is aware of risks associated with:  · Disposition  o Call or Return if symptoms worsen or persist.  o Appointment Requested ([id number]) and Recall  created ([id number])  Careprovider : Mango Kumar DO ([id number])  Location : [facility]  Appointment : Follow-up / Office Visit  Date : 3 months +/- 2 days  Override : No  Comments/Instructions : f/u chronic conditions, labs before if needed            Electronically Signed by: Mango Kumar DO -Author on April 29, 2020 05:38:02 AM

## 2021-05-13 NOTE — PROGRESS NOTES
Progress Note      Patient Name: Vladimir Carter   Patient ID: [id number]   Sex: Male   YOB: 1927    Primary Care Provider: Mango Kumar DO   Referring Provider: Mango Kumar DO    Visit Date: September 29, 2020    Provider: Ermias Tang MD   Location: [facility]   Location Address: [address] [place] [address]  [id number]   Location Phone: [phone]          Chief Complaint  · ANNUAL EXAM       History Of Present Illness  .   TELEHEALTH TELEPHONE VISIT  Vladimir Carter is a 89 year old /White male who is presenting for evaluation via telehealth telephone visit. Verbal consent obtained before beginning visit.   Provider spent 11 minutes with the patient during the telehealth visit.   The following staff were present during this visit: Nam Thomas   Past Medical History/ Overview of Patient Symptoms                  Past Medical History  Benign non-nodular prostatic hyperplasia with lower urinary tract symptoms; Bone Density Screening; CAD (coronary artery disease); Chronic pain syndrome; CKD (chronic kidney disease) stage 3, GFR 30-59 ml/min; GERD (gastroesophageal reflux disease) without esophagitis; H/O prostate cancer; HTN (hypertension); Long term (current) use of anticoagulants; Lupus anticoagulant disorder; BENJA (obstructive sleep apnea); Osteoarthritis of left knee, end-stage; Osteoporosis; Overweight (BMI 25.0-29.9); Pulmonary HTN; Severe tricuspid regurgitation; Tinea unguium         Past Surgical History  Brachytherapy; Cholecystectomy; Colonoscopy; Cystoscopy; Hernia; Knee replacement, left; Knee replacement, right; Wrist         Medication List  Amitiza 8 mcg oral capsule; amlodipine 5 mg oral tablet; Calcium 600 + D(3) 600 mg(1,500mg) -400 unit oral tablet; cetirizine 10 mg oral tablet; docusate sodium 100 mg oral tablet; Eliquis 2.5 mg oral tablet; Flomax 0.4 mg oral capsule; ipratropium-albuterol 0.5 mg-3 mg(2.5 mg base)/3 mL  inhalation solution for nebulization; Lasix 20 mg oral tablet; losartan 100 mg oral tablet; metoprolol succinate 50 mg oral tablet extended release 24 hr; Myrbetriq 25 mg oral tablet extended release 24 hr; pantoprazole 40 mg oral tablet,delayed release (DR/EC); potassium chloride 20 mEq oral tablet extended release; PreserVision AREDS 7,160-113-100 unit-mg-unit oral tablet         Allergy List  NO KNOWN DRUG ALLERGIES         Family Medical History  Breast Neoplasm, Malignant; Heart Disease; Cancer, Unspecified; Diabetes, unspecified type; Hypertension         Social History  Active but no formal exercise; Alcohol (Never); Denies substance abuse; lives with children; lives with spouse; Living will in place; .; Recreational Drug Use (Never); Retired.; Tobacco (Never); Uses seatbelts         Immunizations  Name Date Admin   Hepatitis A    Hepatitis A    Influenza    Influenza    Influenza    Influenza    Influenza    Influenza    Prevnar 13          Review of Systems  · Constitutional  o Denies  o : chills  · Respiratory  o Denies  o : cough  · Gastrointestinal  o Denies  o : nausea              Assessment  · Prostate Cancer, Stage I     185  · Benign non-nodular prostatic hyperplasia with lower urinary tract symptoms     600.91/N40.1  · Urge incontinence     788.31/N39.41    Problems Reconciled  Plan  · Orders  o Physician Telephone Evaluation, 11-20 minutes (58588) - 185, 600.91/N40.1 - 09/29/2020  o Urinalysis dipstick auto w micro (55549) - 185, 600.91/N40.1, 788.31/N39.41 - 09/29/2020  o Urine cytology (02101) - 185, 600.91/N40.1, 788.31/N39.41 - 09/29/2020  o PSA Ultrasensitive, ANNUAL SCREENING [facility] (57005, ) - - 09/29/2021  · Medications  o Medications have been Reconciled  o Transition of Care or Provider Policy  · Instructions  o Plan Of Care:   o Electronically Identified Patient Education Materials Provided Electronically       Urge incontinence    Continue Myrbetriq 25 mg daily, refilled  today    BPH    Continue Flomax 0.4 mg daily, refilled today    Patient with an erythematous spot on the posterior bladder wall.  We discussed this today he was scheduled for cystoscopy a few months back but because of his age and COVID we have not done this after discussing risk and benefits at this time we will have his family drop off a urine for UA with micro and cytology.      Follow-up in 1 year with PVR and PSA             Electronically Signed by: Ermias Tang MD -Author on September 29, 2020 10:51:03 AM

## 2021-05-13 NOTE — PROGRESS NOTES
Progress Note      Patient Name: Vladimir Carter   Patient ID: [id number]   Sex: Male   YOB: 1927    Primary Care Provider: Mango Kumar DO   Referring Provider: Mango Kumar DO    Visit Date: August 26, 2020    Provider: Enzo Ferrell DPM   Location: [facility]   Location Address: [address] [place] [address]  [id number]   Location Phone: [phone]          Chief Complaint  · Routine Foot Care Visit      History Of Present Illness  Vladimir Carter complains of painful, elongated toenails which are thickened, yellowed, chalky, and cause pain with shoe gear and ambulation.      New, Established, New Problem:  Established   Location:  Toenails  Duration:   Greater than five years  Onset:  Gradual  Nature:  Sore with palpation.  Stable, worsening, improving:   Worsening  Aggravating factors:  Pain with shoe gear and ambulation.  Previous Treatment:  Debridement    Patient denies any fevers, chills, nausea, vomiting, nor any other constitutional signs nor symptoms.    Patient reports that no changes in their medications with their recent appointment with their primary care provider.       Past Medical History  Benign non-nodular prostatic hyperplasia with lower urinary tract symptoms; Bone Density Screening; CAD (coronary artery disease); Chronic pain syndrome; CKD (chronic kidney disease) stage 3, GFR 30-59 ml/min; GERD (gastroesophageal reflux disease) without esophagitis; H/O prostate cancer; HTN (hypertension); Long term (current) use of anticoagulants; Lupus anticoagulant disorder; BENJA (obstructive sleep apnea); Osteoarthritis of left knee, end-stage; Osteoporosis; Overweight (BMI 25.0-29.9); Pulmonary HTN; Severe tricuspid regurgitation; Tinea unguium         Past Surgical History  Brachytherapy; Cholecystectomy; Colonoscopy; Cystoscopy; Hernia; Knee replacement, left; Knee replacement, right; Wrist         Medication List  Amitiza 8 mcg oral  capsule; amlodipine 5 mg oral tablet; Calcium 600 + D(3) 600 mg(1,500mg) -400 unit oral tablet; cetirizine 10 mg oral tablet; docusate sodium 100 mg oral tablet; Eliquis 2.5 mg oral tablet; Flomax 0.4 mg oral capsule; ipratropium-albuterol 0.5 mg-3 mg(2.5 mg base)/3 mL inhalation solution for nebulization; Lasix 20 mg oral tablet; losartan 100 mg oral tablet; metoprolol succinate 50 mg oral tablet extended release 24 hr; Myrbetriq 25 mg oral tablet extended release 24 hr; pantoprazole 40 mg oral tablet,delayed release (DR/EC); potassium chloride 20 mEq oral tablet extended release; PreserVision AREDS 7160-113-100 unit-mg-unit oral tablet         Allergy List  NO KNOWN DRUG ALLERGIES       Allergies Reconciled  Family Medical History  Breast Neoplasm, Malignant; Heart Disease; Cancer, Unspecified; Diabetes, unspecified type; Hypertension         Social History  Active but no formal exercise; Alcohol (Never); Denies substance abuse; lives with children; lives with spouse; Living will in place; .; Recreational Drug Use (Never); Retired.; Tobacco (Never); Uses seatbelts         Immunizations  Name Date Admin   Hepatitis A    Hepatitis A    Influenza    Influenza    Influenza    Influenza    Influenza    Influenza    Prevnar 13          Review of Systems  · Constitutional  o Denies  o : fatigue, night sweats  · Eyes  o Denies  o : double vision, blurred vision  · HENT  o Denies  o : vertigo, recent head injury  · Cardiovascular  o Denies  o : chest pain, irregular heart beats  · Respiratory  o Denies  o : shortness of breath, productive cough  · Gastrointestinal  o Denies  o : nausea, vomiting  · Genitourinary  o Denies  o : dysuria, urinary retention  · Integument  o * See HPI  · Neurologic  o Denies  o : altered mental status, seizures  · Musculoskeletal  o Denies  o : joint swelling, limitation of motion  · Endocrine  o Denies  o : cold intolerance, heat intolerance  · Heme-Lymph  o Denies  o : petechiae,  lymph node enlargement or tenderness  · Allergic-Immunologic  o Denies  o : frequent illnesses      Physical Examination  · Constitutional  o Appearance  o : well-nourished, well developed, no obvious deformities present, appears to be chronically ill. Patient uses a cane for assistance with ambulation. He is very hard of hearing.  · Eyes  o Vision  o : Patient wearing glasses.  · Respiratory  o Respiratory Effort  o : No labored breathing. Good respiratory effort.   · Cardiovascular  o Peripheral Vascular System  o :   § Pedal Pulses  § : Pedal Pulses are 2+ and symmetrical  § Extremities  § : There is no edema of the lower extremities  · Musculoskeletal  o Extremeties/Joint  o : Lower extremity muscle strength and range of motion is equal and symmetrical bilaterally. The knees are noted to be in normal alignment. Ankle alignment and range of motion is normal and foot structure is normal.  · Skin and Subcutaneous Tissue  o General Inspection  o : no lesions present, no areas of discoloration, skin turgor normal, texture normal  · Neurologic  o Sensation  o : Sharp/dull sensation is within normal limits bilaterally. Brighton-Martin 5.07 monofilament intact to all assessed areas bilaterally.   · Toes  o Toes: Right Foot  o :   § Toenails  § : Toenails are hypertrophic, mycotic, dystrophic, brittle toenail(s) at nail 1, 2, 3, 4, 5 with onycholysis of the right foot. The 1st, 2nd, 3rd, 4th, 5th toenail(s) on the right have 2 mm in thickness with subungual detritus. There is an incurvated toenail at the distal border of the 1st, 2nd, 3rd, 4th, 5th toe  o Toes: Left Foot  o :   § Toenails  § : There are hypertrophic, mycotic, dystrophic, brittle toenail(s) at the 1, 2, 3, 4, 5 with onycholysis of the left foot. The 1st, 2nd, 3rd, 4th, 5th toenail(s) on the left have 2 mm in thickness with subungual detritus. There is an incurvated toenail at the distal border of the 1st, 2nd, 3rd, 4th, 5th toe  · Procedures  o Nail  Debridement  o : Nail debridement is indicated for the following toenails:, left hallux, left 2nd toe, left 3rd toe, left 4th toe, left 5th toe, right hallux, right 2nd toe, right 3rd toe, right 4th toe, right 5th toe. The nail was debrided of excessive thickness to appropriate levels of comfort and contour using, nail nippers. The procedure was without complications          Assessment  · Foot pain, left     729.5/M79.672  · Foot pain, right     729.5/M79.671  · Ingrowing nail     703.0/L60.0  · Tinea unguium     110.1/B35.1  · Difficulty walking     719.7/R26.2      Plan  · Orders  o Debridement of six or more nails (89231, 09868, 23570, 84484, 48793, 62666) - 110.1/B35.1, 729.5/M79.672, 729.5/M79.671, 719.7/R26.2 - 08/26/2020  · Medications  o Medications have been Reconciled  o Transition of Care or Provider Policy  · Instructions  o I have discussed the findings of this evaluation with the patient. The discussion included a complete verbal explanation of any changes in the examination results, diagnosis, and the current treatment plan. A schedule for future care needs was explained. If any questions should arise after returning home, I have encouraged the patient to feel free to contact Dr. Ferrell. The patient states understanding and agreement with this plan.   o Patient is to monitor for problems and to contact Dr. Ferrell for follow-up should such signs occur. Patient states understanding and agreement with this plan.   o Follow up in 9 weeks for Routine Foot Care.   o Encouraged to follow-up with Primary Care Provider for preventative care.   o Electronically Identified Patient Education Materials Provided Electronically  · Disposition  o Call or Return if symptoms worsen or persist.            Electronically Signed by: Enzo Ferrell DPM -Author on August 26, 2020 01:51:51 PM

## 2021-05-13 NOTE — PROGRESS NOTES
Progress Note      Patient Name: Vladimir Carter   Patient ID: [id number]   Sex: Male   YOB: 1927    Primary Care Provider: Mango Kumar DO   Referring Provider: Mango Kumar DO    Visit Date: June 11, 2020    Provider: Paxton Luis MD   Location: [facility]   Location Address: [address] [place] [address], [facility]   NEDA Flores  [id number]   Location Phone: [phone]          Chief Complaint     Shortness of breath.  Pedal edema.       History Of Present Illness  REFERRING PROVIDER: Mango Kumar DO   Vladimir Carter is a 89 year old /White male who states his pedal edema is better. His shortness of breath is mild, mostly when he exerts himself out in the garden and that is unchanged for him. He denies any chest pain. No palpitations, dizziness, syncope, PND, or orthopnea. Cardiac-wise, he is feeling very well. He is walking some, but not on a regular basis, and he has gained 10 pounds since his last visit.   PAST MEDICAL HISTORY: DVT/PE; Hypertension.   FAMILY HISTORY: Positive for hypertension. Negative for diabetes mellitus or heart disease.   PSYCHOSOCIAL HISTORY: Denies alcohol or tobacco use. Denies mood changes or depression.   CURRENT MEDICATIONS: Eliquis 2.5 mg b.i.d.; calcium with vitamin D daily; Zyrtec 10 mg daily; docusate sodium 100 mg daily; tamsulosin 0.4 mg daily; potassium chloride 20 mEq daily; Toprol XL 50 mg b.i.d.; pantoprazole 40 mg daily; Percocet p.r.n.; losartan 100 mg daily; Lasix 20 mg b.i.d.; amlodipine 5 mg daily; Myrbetriq 25 mg daily; Osteo Bi-Flex daily; Vitafusion Men's Vitamin; fluticasone nasal spray; MiraLax; PreserVision; Albuterol; vitamin D3 2,000 units; hemp oil. Denies alcohol or tobacco use.       Review of Systems  · Cardiovascular  o Admits  o : swelling (feet, ankles, hands), shortness of breath while walking or lying flat  o Denies  o : palpitations (fast, fluttering, or skipping beats), chest pain or angina  "pectoris   · Respiratory  o Denies  o : chronic or frequent cough, asthma or wheezing      Vitals  Date Time BP Position Site L\R Cuff Size HR RR TEMP (F) WT  HT  BMI kg/m2 BSA m2 O2 Sat HC       06/11/2020 10:28 /64 Sitting    54 - R   208lbs 0oz 5'  11\" 29.01 2.17     06/11/2020 10:28 /62 Sitting                      Home blood pressure were reviewed.       Physical Examination  · Constitutional  o Appearance  o : Awake, alert, in no acute distress.  · Respiratory  o Respiratory  o : Kyphosis with good respiratory effort. Clear to percussion and auscultation.   · Cardiovascular  o Heart  o : PMI is normal. S1, S2 normal. No S3. No S4. Negative systolic/diastolic murmur.  o Peripheral Vascular System  o :   § Extremities  § : Trace pedal edema. Good femoral and pedal pulses.  · Gastrointestinal  o Abdominal Examination  o : Soft. No tenderness or masses felt. No hepatosplenomegaly. Abdominal aorta is not palpable.  · Labs  o Labs  o : BUN 18, creatinine 1.22. Total cholesterol 122, triglycerides 57, HDL 46, LDL 65.          Assessment     1.  Hypertension with degree of white coat syndrome and fairly good readings at home.  2.  Pedal edema, stable.  3.  History of DVT and PE.  4.  Pulmonary hypertension.    5.  Mildly elevated creatinine.       Plan     1.  Continue current medications.  2.  Repeat a BMP in 1 month.  3.  Follow up in 6 months with labs and another blood pressure log or earlier if needed.      FIORELLA Garza/Paxton Luis MD, [facility]  JF/PM/               Electronically Signed by: Hawa Redman-, Other -Author on June 17, 2020 07:12:32 AM  Electronically Co-signed by: FIORELLA Daly -Reviewer on June 23, 2020 01:27:01 PM  Electronically Co-signed by: Paxton Luis MD -Reviewer on July 1, 2020 08:00:51 PM  "

## 2021-05-13 NOTE — PROGRESS NOTES
Progress Note      Patient Name: Vladimir Carter   Patient ID: [id number]   Sex: Male   YOB: 1927    Primary Care Provider: Mango Kumar DO   Referring Provider: Mango Kumar DO    Visit Date: November 18, 2020    Provider: Enzo Ferrell DPM   Location: [facility]   Location Address: [address] [place] [address]  [id number]   Location Phone: [phone]          Chief Complaint  · Routine Foot Care Visit      History Of Present Illness  Vladimir Carter complains of painful, elongated toenails which are thickened, yellowed, chalky, and cause pain with shoe gear and ambulation.      New, Established, New Problem:  Established   Location:  Toenails  Duration:   Greater than five years  Onset:  Gradual  Nature:  Sore with palpation.  Stable, worsening, improving:   stable  Aggravating factors:  Pain with shoe gear and ambulation.  Previous Treatment:  Debridement    Patient denies any fevers, chills, nausea, vomiting, nor any other constitutional signs nor symptoms.    Patient relates no medical changes since their last visit.       Past Medical History  Benign non-nodular prostatic hyperplasia with lower urinary tract symptoms; Bone Density Screening; CAD (coronary artery disease); Chronic pain syndrome; CKD (chronic kidney disease) stage 3, GFR 30-59 ml/min; GERD (gastroesophageal reflux disease) without esophagitis; H/O prostate cancer; HTN (hypertension); Long term (current) use of anticoagulants; Lupus anticoagulant disorder; BENJA (obstructive sleep apnea); Osteoarthritis of left knee, end-stage; Osteoporosis; Overweight (BMI 25.0-29.9); Pulmonary HTN; Severe tricuspid regurgitation; Tinea unguium         Past Surgical History  Brachytherapy; Cholecystectomy; Colonoscopy; Cystoscopy; Hernia; Knee replacement, left; Knee replacement, right; Wrist         Medication List  Amitiza 8 mcg oral capsule; amlodipine 5 mg oral tablet; Calcium 600 + D(3) 600 mg(1,500mg) -400  unit oral tablet; cetirizine 10 mg oral tablet; docusate sodium 100 mg oral tablet; Eliquis 2.5 mg oral tablet; Flomax 0.4 mg oral capsule; ipratropium-albuterol 0.5 mg-3 mg(2.5 mg base)/3 mL inhalation solution for nebulization; Lasix 20 mg oral tablet; losartan 100 mg oral tablet; metoprolol succinate 50 mg oral tablet extended release 24 hr; Myrbetriq 25 mg oral tablet extended release 24 hr; pantoprazole 40 mg oral tablet,delayed release (DR/EC); potassium chloride 20 mEq oral tablet extended release; PreserVision AREDS 7,160-113-100 unit-mg-unit oral tablet         Allergy List  NO KNOWN DRUG ALLERGIES       Allergies Reconciled  Family Medical History  Breast Neoplasm, Malignant; Heart Disease; Cancer, Unspecified; Diabetes, unspecified type; Hypertension         Social History  Active but no formal exercise; Alcohol (Never); Denies substance abuse; lives with children; lives with spouse; Living will in place; .; Recreational Drug Use (Never); Retired.; Tobacco (Never); Uses seatbelts         Immunizations  Name Date Admin   Hepatitis A 12/18/2018   Hepatitis A 06/18/2018   Influenza 10/07/2019   Influenza 10/15/2018   Influenza 09/26/2017   Influenza 09/29/2016   Influenza 10/16/2015   Influenza 09/22/2014   Prevnar 13 10/07/2019         Review of Systems  · Constitutional  o Denies  o : fatigue, night sweats  · Eyes  o Denies  o : double vision, blurred vision  · HENT  o Denies  o : vertigo, recent head injury  · Cardiovascular  o Denies  o : chest pain, irregular heart beats  · Respiratory  o Denies  o : shortness of breath, productive cough  · Gastrointestinal  o Denies  o : nausea, vomiting  · Genitourinary  o Denies  o : dysuria, urinary retention  · Integument  o * See HPI  · Neurologic  o Denies  o : altered mental status, seizures  · Musculoskeletal  o Denies  o : joint swelling, limitation of motion  · Endocrine  o Denies  o : cold intolerance, heat intolerance  · Heme-Lymph  o Denies  o :  "petechiae, lymph node enlargement or tenderness  · Allergic-Immunologic  o Denies  o : frequent illnesses      Vitals  Date Time BP Position Site L\R Cuff Size HR RR TEMP (F) WT  HT  BMI kg/m2 BSA m2 O2 Sat FR L/min FiO2 HC       11/18/2020 02:04 /50 Sitting    66 - R  97.1 211lbs 0oz 5'  11\" 29.43 2.19 95 %      11/18/2020 02:04 /50 Sitting                       Physical Examination  · Constitutional  o Appearance  o : well-nourished, well developed, no obvious deformities present, appears to be chronically ill. Patient uses a cane for assistance with ambulation. He is very hard of hearing.  · Eyes  o Vision  o : Patient wearing glasses.  · Respiratory  o Respiratory Effort  o : No labored breathing. Good respiratory effort.   · Cardiovascular  o Peripheral Vascular System  o :   § Pedal Pulses  § : Pedal Pulses are 2+ and symmetrical  § Extremities  § : There is no edema of the lower extremities  · Musculoskeletal  o Extremeties/Joint  o : Lower extremity muscle strength and range of motion is equal and symmetrical bilaterally. The knees are noted to be in normal alignment. Ankle alignment and range of motion is normal and foot structure is normal.  · Skin and Subcutaneous Tissue  o General Inspection  o : no lesions present, no areas of discoloration, skin turgor normal, texture normal  · Neurologic  o Sensation  o : Sharp/dull sensation is within normal limits bilaterally. [place]-Martin 5.07 monofilament intact to all assessed areas bilaterally.   · Toes  o Toes: Right Foot  o :   § Toenails  § : Toenails are hypertrophic, mycotic, dystrophic, brittle toenail(s) at nail 1, 2, 3, 4, 5 with onycholysis of the right foot. The 1st, 2nd, 3rd, 4th, 5th toenail(s) on the right have 2 mm in thickness with subungual detritus. There is an incurvated toenail at the distal border of the 1st, 2nd, 3rd, 4th, 5th toe  o Toes: Left Foot  o :   § Toenails  § : There are hypertrophic, mycotic, dystrophic, brittle " toenail(s) at the 1, 2, 3, 4, 5 with onycholysis of the left foot. The 1st, 2nd, 3rd, 4th, 5th toenail(s) on the left have 2 mm in thickness with subungual detritus. There is an incurvated toenail at the distal border of the 1st, 2nd, 3rd, 4th, 5th toe  · Procedures  o Nail Debridement  o : Nail debridement is indicated for the following toenails:, left hallux, left 2nd toe, left 3rd toe, left 4th toe, left 5th toe, right hallux, right 2nd toe, right 3rd toe, right 4th toe, right 5th toe. The nail was debrided of excessive thickness to appropriate levels of comfort and contour using, nail nippers. The procedure was without complications          Assessment  · Foot pain, left     729.5/M79.672  · Foot pain, right     729.5/M79.671  · Ingrowing nail     703.0/L60.0  · Tinea unguium     110.1/B35.1  · Difficulty walking     719.7/R26.2      Plan  · Orders  o Debridement of six or more nails (86646, 28928, 93108, 40123, 47986, 59200, 64715) - 110.1/B35.1, 729.5/M79.672, 729.5/M79.671, 719.7/R26.2 - 11/18/2020  · Medications  o Medications have been Reconciled  o Transition of Care or Provider Policy  · Instructions  o I have discussed the findings of this evaluation with the patient. The discussion included a complete verbal explanation of any changes in the examination results, diagnosis, and the current treatment plan. A schedule for future care needs was explained. If any questions should arise after returning home, I have encouraged the patient to feel free to contact Dr. Ferrell. The patient states understanding and agreement with this plan.   o Patient is to monitor for problems and to contact Dr. Ferrell for follow-up should such signs occur. Patient states understanding and agreement with this plan.   o Follow up in 9 weeks for Routine Foot Care.   o Encouraged to follow-up with Primary Care Provider for preventative care.   o Electronically Identified Patient Education Materials Provided  Electronically  · Disposition  o Call or Return if symptoms worsen or persist.            Electronically Signed by: Enzo Ferrell DPM -Author on November 18, 2020 02:09:12 PM

## 2021-05-13 NOTE — PROGRESS NOTES
Progress Note      Patient Name: Vladimir Carter   Patient ID: [id number]   Sex: Male   YOB: 1927    Primary Care Provider: Mango Kumar DO   Referring Provider: Mango Kumar DO    Visit Date: Brianna 3, 2020    Provider: Enzo Ferrell DPM   Location: [facility]   Location Address: [address] [place] [address]  [id number]   Location Phone: [phone]          Chief Complaint  · Routine Foot Care Visit      History Of Present Illness  Vladimir Carter complains of painful, elongated toenails which are thickened, yellowed, chalky, and cause pain with shoe gear and ambulation.      New, Established, New Problem:  Established   Location:  Toenails  Duration:   Greater than five years  Onset:  Gradual  Nature:  Sore with palpation.  Stable, worsening, improving:   Worsening  Aggravating factors:  Pain with shoe gear and ambulation.  Previous Treatment:  Debridement    Patient denies any fevers, chills, nausea, vomiting, nor any other constitutional signs nor symptoms.    Patient relates no medical changes since their last visit.       Past Medical History  Benign non-nodular prostatic hyperplasia with lower urinary tract symptoms; Bone Density Screening; CAD (coronary artery disease); Chronic pain syndrome; CKD (chronic kidney disease) stage 3, GFR 30-59 ml/min; GERD (gastroesophageal reflux disease) without esophagitis; H/O prostate cancer; HTN (hypertension); Long term (current) use of anticoagulants; Lupus anticoagulant disorder; EBNJA (obstructive sleep apnea); Osteoarthritis of left knee, end-stage; Osteoporosis; Overweight (BMI 25.0-29.9); Pulmonary HTN; Severe tricuspid regurgitation; Tinea unguium         Past Surgical History  Brachytherapy; Cholecystectomy; Colonoscopy; Cystoscopy; Hernia; Knee replacement, left; Knee replacement, right; Wrist         Medication List  Amitiza 8 mcg oral capsule; amlodipine 5 mg oral tablet; Calcium 600 + D(3) 600  mg(1,500mg) -400 unit oral tablet; cetirizine 10 mg oral tablet; Eliquis 2.5 mg oral tablet; Flomax 0.4 mg oral capsule; ipratropium-albuterol 0.5 mg-3 mg(2.5 mg base)/3 mL inhalation solution for nebulization; Lasix 20 mg oral tablet; losartan 100 mg oral tablet; metoprolol succinate 50 mg oral tablet extended release 24 hr; Myrbetriq 25 mg oral tablet extended release 24 hr; pantoprazole 40 mg oral tablet,delayed release (DR/EC); potassium chloride 20 mEq oral tablet extended release; PreserVision AREDS 7,160-113-100 unit-mg-unit oral tablet         Allergy List  NO KNOWN DRUG ALLERGIES       Allergies Reconciled  Family Medical History  Breast Neoplasm, Malignant; Heart Disease; Cancer, Unspecified; Diabetes, unspecified type; Hypertension         Social History  Active but no formal exercise; Alcohol (Never); Denies substance abuse; lives with children; lives with spouse; Living will in place; .; Recreational Drug Use (Never); Retired.; Tobacco (Never); Uses seatbelts         Immunizations  Name Date Admin   Hepatitis A    Hepatitis A    Influenza    Influenza    Influenza    Influenza    Influenza    Influenza    Prevnar 13          Review of Systems  · Constitutional  o Denies  o : fatigue, night sweats  · Eyes  o Denies  o : double vision, blurred vision  · HENT  o Denies  o : vertigo, recent head injury  · Cardiovascular  o Denies  o : chest pain, irregular heart beats  · Respiratory  o Denies  o : shortness of breath, productive cough  · Gastrointestinal  o Denies  o : nausea, vomiting  · Genitourinary  o Denies  o : dysuria, urinary retention  · Integument  o * See HPI  · Neurologic  o Denies  o : altered mental status, seizures  · Musculoskeletal  o Denies  o : joint swelling, limitation of motion  · Endocrine  o Denies  o : cold intolerance, heat intolerance  · Heme-Lymph  o Denies  o : petechiae, lymph node enlargement or tenderness  · Allergic-Immunologic  o Denies  o : frequent  "illnesses      Vitals  Date Time BP Position Site L\R Cuff Size HR RR TEMP (F) WT  HT  BMI kg/m2 BSA m2 O2 Sat HC       06/03/2020 03:04 /53 Sitting    62 - R  97.5 214lbs 6oz 5'  11\" 29.9 2.21 94 %          Physical Examination  · Constitutional  o Appearance  o : well-nourished, well developed, no obvious deformities present, appears to be chronically ill. Patient uses a cane for assistance with ambulation. He is very hard of hearing.  · Eyes  o Vision  o : Patient wearing glasses.  · Respiratory  o Respiratory Effort  o : No labored breathing. Good respiratory effort.   · Cardiovascular  o Peripheral Vascular System  o :   § Pedal Pulses  § : Pedal Pulses are 2+ and symmetrical  § Extremities  § : There is no edema of the lower extremities  · Musculoskeletal  o Extremeties/Joint  o : Lower extremity muscle strength and range of motion is equal and symmetrical bilaterally. The knees are noted to be in normal alignment. Ankle alignment and range of motion is normal and foot structure is normal.  · Skin and Subcutaneous Tissue  o General Inspection  o : no lesions present, no areas of discoloration, skin turgor normal, texture normal  · Neurologic  o Sensation  o : Sharp/dull sensation is within normal limits bilaterally. [place] 5.07 monofilament intact to all assessed areas bilaterally.   · Toes  o Toes: Right Foot  o :   § Toenails  § : Toenails are hypertrophic, mycotic, dystrophic, brittle toenail(s) at nail 1, 2, 3, 4, 5 with onycholysis of the right foot. The 1st, 2nd, 3rd, 4th, 5th toenail(s) on the right have 2 mm in thickness with subungual detritus. There is an incurvated toenail at the distal border of the 1st, 2nd, 3rd, 4th, 5th toe  o Toes: Left Foot  o :   § Toenails  § : There are hypertrophic, mycotic, dystrophic, brittle toenail(s) at the 1, 2, 3, 4, 5 with onycholysis of the left foot. The 1st, 2nd, 3rd, 4th, 5th toenail(s) on the left have 2 mm in thickness with subungual detritus. " There is an incurvated toenail at the distal border of the 1st, 2nd, 3rd, 4th, 5th toe  · Procedures  o Nail Debridement  o : Nail debridement is indicated for the following toenails:, left hallux, left 2nd toe, left 3rd toe, left 4th toe, left 5th toe, right hallux, right 2nd toe, right 3rd toe, right 4th toe, right 5th toe. The nail was debrided of excessive thickness to appropriate levels of comfort and contour using, nail nippers. The procedure was without complications          Assessment  · Foot pain, left     729.5/M79.672  · Foot pain, right     729.5/M79.671  · Ingrowing nail     703.0/L60.0  · Tinea unguium     110.1/B35.1  · Difficulty walking     719.7/R26.2      Plan  · Orders  o Debridement of six or more nails (31000, 60589, 31205, 00661, 47688) - 110.1/B35.1, 729.5/M79.672, 729.5/M79.671, 719.7/R26.2 - 06/03/2020  · Medications  o Medications have been Reconciled  o Transition of Care or Provider Policy  · Instructions  o I have discussed the findings of this evaluation with the patient. The discussion included a complete verbal explanation of any changes in the examination results, diagnosis, and the current treatment plan. A schedule for future care needs was explained. If any questions should arise after returning home, I have encouraged the patient to feel free to contact Dr. Ferrell. The patient states understanding and agreement with this plan.   o Patient is to monitor for problems and to contact Dr. Ferrell for follow-up should such signs occur. Patient states understanding and agreement with this plan.   o Follow up in 9 weeks for Routine Foot Care.   o Encouraged to follow-up with Primary Care Provider for preventative care.   o Electronically Identified Patient Education Materials Provided Electronically  · Disposition  o Call or Return if symptoms worsen or persist.            Electronically Signed by: Enzo Ferrell DPM -Author on Brianna 3, 2020 03:15:28 PM

## 2021-05-13 NOTE — PROGRESS NOTES
Progress Note      Patient Name: Vladimir Carter   Patient ID: [id number]   Sex: Male   YOB: 1927    Primary Care Provider: Mango Kumar DO   Referring Provider: Mango Kumar DO    Visit Date: September 8, 2020    Provider: Mango Kumar DO   Location: [facility]   Location Address: [address] [place] [address]  [address]  [place], [place]  [id number]   Location Phone: [phone]          Chief Complaint  · Annual Wellness Exam      History Of Present Illness  The patient is a 89 year old /White male who has come to this office for his Annual Wellness Visit.   His Primary Care Provider is Mango Kumar DO. His comprehensive Care Team list, including suppliers, has been updated on the Facesheet. His medical/family history, height, weight, BMI, and blood pressure have been reviewed and are in the chart. The Health Risk Assessment has been completed and scanned in the chart.   Medications are listed in the medication list.   The active problem list includes: Benign non-nodular prostatic hyperplasia with lower urinary tract symptoms, CAD (coronary artery disease), Chronic pain syndrome, CKD (chronic kidney disease) stage 3, GFR 30-59 ml/min, GERD (gastroesophageal reflux disease) without esophagitis, H/O prostate cancer, HTN (hypertension), Long term (current) use of anticoagulants, Lupus anticoagulant disorder, BENJA (obstructive sleep apnea), Osteoporosis, Overweight (BMI 25.0-29.9), Pulmonary HTN, and Severe tricuspid regurgitation   The patient does not have a history of substance use.   Patient reports his diet is adequate.   The Mini-Cog has been administered and is scanned in chart. The results are positive. His cognitive function is limited, including forgetfulness, with the severity being mild.   A hearing loss screen was completed today and the result is positive, indicating a need for further evaluation.   Patient Patient already has hearing aids. Patient  completed the PHQ-9 today and it has been scanned in the chart. The total score is 0.   The Timed Up and Go screen was administered today and the result is negative.   The [place] Index of [address] in ADLs indicated full function (score of 6).   A Falls Risk Assessment has been completed, including a review of home fall hazards and medication review.   Overall, the patient's functional ability is noted by this provider to be within normal limits. His level of safety is noted to be within normal limits. His balance/gait is within normal limits. There have been no falls in the past year. Patient-specific home safety recommendations have been reviewed and a copy has been given to patient.   He admits issues with leaking urine. This happens infrequently and he would not like to discuss this further today.   There are no additional risk factors identified.   Living Will/Advanced Directive previously executed and scanned in chart.   Personalized health advice was given to the patient and a written health screening schedule was established; see Plan for details.       Past Medical History  Disease Name Date Onset Notes   Benign non-nodular prostatic hyperplasia with lower urinary tract symptoms 10/29/2018 --    Bone Density Screening 09/29/2019 Spine-Osteopenia Hips-Osteoporosis   CAD (coronary artery disease) --  --    Chronic pain syndrome --  --    CKD (chronic kidney disease) stage 3, GFR 30-59 ml/min --  --    GERD (gastroesophageal reflux disease) without esophagitis 09/16/2016 --    H/O prostate cancer --  --    HTN (hypertension) --  --    Long term (current) use of anticoagulants --  --    Lupus anticoagulant disorder --  --    BENJA (obstructive sleep apnea) --  --    Osteoarthritis of left knee, end-stage 04/22/2016 --    Osteoporosis --  Osteoporosis in the hips/femur no fracture    Overweight (BMI 25.0-29.9) --  --    Pulmonary HTN --  --    Severe tricuspid regurgitation 5/2018 ECHO (TTE) Diastolic  dysfunction, Pulmonary HTN and Severe Tricuspid regurgitation.    Tinea unguium 01/24/2019 --          Past Surgical History  Procedure Name Date Notes   Brachytherapy --  --    Cholecystectomy --  --    Colonoscopy --  --    Cystoscopy --  --    Hernia --  --    Knee replacement, left 5- --    Knee replacement, right --  --    Wrist --  --          Medication List  Name Date Started Instructions   Amitiza 8 mcg oral capsule 04/13/2020 take 1 capsule (8 mcg) by oral route 1 times per day as needed for constipation   amlodipine 5 mg oral tablet 03/18/2020 take 1 tablet (5 mg) by oral route once daily   Calcium 600 + D(3) 600 mg(1,500mg) -400 unit oral tablet 04/13/2020 take 2 tablets by oral route daily for 90 days   cetirizine 10 mg oral tablet 07/14/2020 take 1 tablet (10 mg) by oral route once daily   docusate sodium 100 mg oral tablet 07/14/2020 take 1 tablet (100 mg) by oral route 2 times per day for 90 days   Eliquis 2.5 mg oral tablet  take 1 tablets (2.5 mg) by oral route 2 times per day for 30 days   Flomax 0.4 mg oral capsule 09/24/2019 take 1 capsule (0.4 mg) by oral route once daily 1/2 hour following the same meal each day for 90 days   ipratropium-albuterol 0.5 mg-3 mg(2.5 mg base)/3 mL inhalation solution for nebulization 01/07/2020 inhale 3 milliliters by nebulization route 4 times per day and as needed, up to 6 doses per day for 30 days   Lasix 20 mg oral tablet 04/13/2020 take 1 tablet (20 mg) by oral route 2 times per day for 90 days   losartan 100 mg oral tablet 03/19/2020 take 1 tablet (100 mg) by oral route once daily for 90 days   metoprolol succinate 50 mg oral tablet extended release 24 hr 03/19/2020 take 2 tablets (100 mg) by oral route once daily for 90 days   Myrbetriq 25 mg oral tablet extended release 24 hr 09/24/2019 take 1 tablet (25 mg) by oral route once daily swallowing whole with water. Do not crush, chew and/or divide. for 90 days   pantoprazole 40 mg oral tablet,delayed  release (DR/EC) 01/07/2020 take 1 tablet (40 mg) by oral route once daily for 90 days   potassium chloride 20 mEq oral tablet extended release 03/19/2020 take 1 tablet (20 meq) by oral route once daily with food for 90 days   PreserVision AREDS 7,160-113-100 unit-mg-unit oral tablet  take 1 tablet by oral route daily         Allergy List  Allergen Name Date Reaction Notes   NO KNOWN DRUG ALLERGIES --  --  --        Allergies Reconciled  Family Medical History  Disease Name Relative/Age Notes   Breast Neoplasm, Malignant  daughter   Heart Disease Mother/   --    Cancer, Unspecified Daughter/   Daughter   Diabetes, unspecified type Daughter/   Daughter   Hypertension Mother/   --          Social History  Finding Status Start/Stop Quantity Notes   Active but no formal exercise --  --/-- --  --    Alcohol Never --/-- --  --    Denies substance abuse --  --/-- --  --    lives with children --  --/-- --  --    lives with spouse --  --/-- --  --    Living will in place --  --/-- --  --    . --  --/-- --  --    Recreational Drug Use Never --/-- --  no   Retired. --  --/-- --  --    Tobacco Never --/-- --  never smoker   Uses seatbelts --  --/-- --  --          Immunizations  NameDate Admin Mfg Trade Name Lot Number Route Inj VIS Given VIS Publication   Hepatitis A12/18/2018 MSD HAVRIX-ADULT [id number] IM RA 12/18/2018 07/20/2016   Comments: Patient tolerated injection well. NDC# 6140-3849-65   Hepatitis A06/18/2018 SKB HAVRIX-ADULT [id number] IM LD 06/18/2018 07/20/2016   Comments: NDC# 28896-159-81. Patient tolerated the injection well with no reaction after a 15 min. wait.   Uxrhlevvk27/07/2019 NE FLUZONE-HIGH DOSE [id number] IM RD 10/07/2019    Comments: Pt tolerated well   Gzcsxeafj90/15/2018 SKB Fluarix, quadrivalent, preservative free [id number] IM LD 10/15/2018 08/07/2015   Comments: NDC# 1089268014. Patient tolerated the injection well with no reaction after a 15 min wait.   Jxdgxvrpf23/26/2017 SKB Fluarix, quadrivalent,  "preservative free T44G9 IM LD 09/26/2017 08/07/2015   Comments: NDC# 84118-230-24; patient tolerated well.   Hbtjnoefi04/29/2016 SKB Fluarix, quadrivalent, preservative free T44G9 IM LD 09/29/2016 09/29/2016   Comments: NDC# 44482-118-48. Patient tolerated well.   Pzzgowznl40/16/2015 SKB Fluarix, quadrivalent, preservative free [id number] IM RD 10/16/2015 07/02/2012   Comments:    Miaxdgdmr17/22/2014 SKB Fluarix, quadrivalent, preservative free [id number] IM RD 09/22/2014 07/02/2012   Comments:    Prevnar 1310/07/2019 [facility]AL PREVNAR 13 [id number] IM LD 10/07/2019    Comments: Pt tolerated well.         Review of Systems  · Constitutional  o Denies  o : fatigue, night sweats  · Eyes  o Denies  o : double vision, blurred vision  · HENT  o Denies  o : vertigo, recent head injury  · Breasts  o Denies  o : abnormal changes in breast size, additional breast symptoms except as noted in the HPI  · Cardiovascular  o Denies  o : chest pain, irregular heart beats  · Respiratory  o Denies  o : shortness of breath, productive cough  · Gastrointestinal  o Denies  o : nausea, vomiting  · Genitourinary  o Denies  o : dysuria, urinary retention  · Integument  o Denies  o : hair growth change, new skin lesions  · Neurologic  o Denies  o : altered mental status, seizures  · Musculoskeletal  o Denies  o : joint swelling, limitation of motion  · Endocrine  o Denies  o : cold intolerance, heat intolerance  · Heme-Lymph  o Denies  o : petechiae, lymph node enlargement or tenderness  · Allergic-Immunologic  o Denies  o : frequent illnesses      Vitals  Date Time BP Position Site L\R Cuff Size HR RR TEMP (F) WT  HT  BMI kg/m2 BSA m2 O2 Sat HC       09/08/2020 09:26 /57 Sitting    51 - R  97.4 210lbs 4oz 5'  11\" 29.32 2.19 96 %    09/08/2020 10:08 /55 Sitting                     Physical Examination  · Head and Face  o Head  o :   § Inspection  § : atraumatic, normocephalic  · Eyes  o Eyes  o : extraocular movements intact, no scleral icterus, no " conjunctival injection  · Ears, Nose, Mouth and Throat  o Ears  o :   § External Ears  § : normal  o Nose  o :   § Intranasal Exam  § : nares patent  o Oral Cavity  o :   § Oral Mucosa  § : moist mucous membranes  · Respiratory  o Respiratory Effort  o : breathing comfortably, symmetric chest rise  o Auscultation of Lungs  o : clear to asculatation bilaterally, no wheezes, rales, or rhonchii  · Cardiovascular  o Heart  o :   § Auscultation of Heart  § : regular rate and rhythm, no murmurs, rubs, or gallops  o Peripheral Vascular System  o :   § Extremities  § : no edema  · Neurologic  o Mental Status Examination  o :   § Orientation  § : grossly oriented to person, place and time  o Gait and Station  o :   § Gait Screening  § : normal gait  · Psychiatric  o General  o : normal mood and affect          Assessment  · Encounter for Medicare annual wellness exam     V70.0/Z00.00  · Screening for depression     V79.0/Z13.89  · Screening for alcoholism     V79.1/Z13.39         Recheck labs in 6 months follow-up with specialist as scheduled sooner if concerns     Problems Reconciled  Plan  · Orders  o Falls Risk Assessment Completed (3288F) - V70.0/Z00.00 - 09/08/2020  o Brief hearing screening (written) [facility] () - V70.0/Z00.00 - 09/08/2020  o Annual Wellness Visit-includes a Personalized Prevention Plan of Service (PPS), SUBSEQUENT VISIT (Medicare) () - V70.0/Z00.00 - 09/08/2020  o ACO-13: Fall Risk Screening with no falls in past year or only one fall without injury in the past year (1101F) - V70.0/Z00.00 - 09/08/2020  o Presence or absence of urinary incontinence assessed (JUSTINA) (1090F) - V70.0/Z00.00 - 09/08/2020  o ACO-18: Negative screen for clinical depression using a standardized tool () - V79.0/Z13.89 - 09/08/2020  o Negative alcohol screening () - V79.1/Z13.39 - 09/08/2020  o ACO-39: Current medications updated and reviewed () - - 09/08/2020  o ACO - Advance Care Plan or Surrogate Decision  Maker documented in EMR (1123F) - - 09/08/2020  · Medications  o Medications have been Reconciled  o Transition of Care or Provider Policy  · Instructions  o Health Risk Assessment has been reviewed with the patient.  o Written health screening schedule for next 5-10 years was established with patient; information scanned in chart and given/mailed to patient.  o Fall prevention methods discussed and a copy of recommendations given/mailed to patient.  o Depression Screen completed and scanned into the EMR under the designated folder within the patient's documents.  o Today's PHQ-9 result is 0.  o Audit-C score of 0-4 - Negative Screen - Brief Discussion  · Disposition  o Call or Return if symptoms worsen or persist.  o Return Visit Request in/on 6 months +/- 2 days (98327).            Electronically Signed by: Mango Kumar DO -Author on September 8, 2020 10:09:26 AM

## 2021-05-14 VITALS
WEIGHT: 211 LBS | SYSTOLIC BLOOD PRESSURE: 156 MMHG | BODY MASS INDEX: 29.54 KG/M2 | HEIGHT: 71 IN | HEART RATE: 66 BPM | OXYGEN SATURATION: 95 % | DIASTOLIC BLOOD PRESSURE: 50 MMHG | TEMPERATURE: 97.1 F

## 2021-05-14 VITALS
BODY MASS INDEX: 29.45 KG/M2 | HEIGHT: 71 IN | DIASTOLIC BLOOD PRESSURE: 54 MMHG | WEIGHT: 210.37 LBS | TEMPERATURE: 97.5 F | OXYGEN SATURATION: 93 % | SYSTOLIC BLOOD PRESSURE: 187 MMHG | HEART RATE: 64 BPM

## 2021-05-14 VITALS
HEART RATE: 55 BPM | HEIGHT: 71 IN | SYSTOLIC BLOOD PRESSURE: 153 MMHG | BODY MASS INDEX: 29.68 KG/M2 | DIASTOLIC BLOOD PRESSURE: 43 MMHG | TEMPERATURE: 97.1 F | WEIGHT: 212 LBS | OXYGEN SATURATION: 93 %

## 2021-05-14 VITALS
SYSTOLIC BLOOD PRESSURE: 165 MMHG | WEIGHT: 210.25 LBS | SYSTOLIC BLOOD PRESSURE: 150 MMHG | BODY MASS INDEX: 29.44 KG/M2 | HEIGHT: 71 IN | DIASTOLIC BLOOD PRESSURE: 57 MMHG | DIASTOLIC BLOOD PRESSURE: 55 MMHG | TEMPERATURE: 97.4 F | OXYGEN SATURATION: 96 % | HEART RATE: 51 BPM

## 2021-05-14 VITALS
HEART RATE: 90 BPM | BODY MASS INDEX: 30.1 KG/M2 | TEMPERATURE: 96.8 F | WEIGHT: 215 LBS | RESPIRATION RATE: 20 BRPM | HEIGHT: 71 IN | DIASTOLIC BLOOD PRESSURE: 56 MMHG | OXYGEN SATURATION: 98 % | SYSTOLIC BLOOD PRESSURE: 180 MMHG

## 2021-05-14 VITALS
SYSTOLIC BLOOD PRESSURE: 158 MMHG | WEIGHT: 208 LBS | BODY MASS INDEX: 29.12 KG/M2 | DIASTOLIC BLOOD PRESSURE: 74 MMHG | HEART RATE: 54 BPM | HEIGHT: 71 IN

## 2021-05-14 NOTE — PROGRESS NOTES
Progress Note      Patient Name: Vladimir Carter   Patient ID: [id number]   Sex: Male   YOB: 1927    Primary Care Provider: Mango Kumar DO   Referring Provider: Mango Kumar DO    Visit Date: January 11, 2021    Provider: Paxton Luis MD   Location: [facility]   Location Address: [address] [place] [address], [address]   [place]  [id number]   Location Phone: [phone]          Chief Complaint  · Hypertension.  · Hyperlipidemia.  · Pulmonary hypertension.  · Pedal edema.       History Of Present Illness  REFERRING PROVIDER: Mango Kumar DO   Vladimir Carter is a 89 year old /White male with hypertension, previous pulmonary embolism, and pulmonary hypertension who is doing reasonably well. He denies chest pain, shortness of breath, paroxysmal nocturnal dyspnea, orthopnea, palpitations, dizziness, or syncope. His home blood pressures are running slightly on the high side. His pedal edema is present, but stable.   PAST MEDICAL HISTORY: DVT/PE; Hypertension.   PSYCHOSOCIAL HISTORY: Denies alcohol use.   CURRENT MEDICATIONS: Apixaban 2.5 mg b.i.d.; cetirizine 10 mg daily; docusate sodium 100 mg b.i.d.; potassium chloride 20 mEq daily; Toprol XL 50 mg b.i.d.; Percocet 5-325 mg q. 12 h. p.r.n.; losartan 100 mg daily; furosemide 20 mg b.i.d.; amlodipine 5 mg daily; tamsulosin 0.4 mg daily; Myrbetriq 25 mg daily; Osteo Bi-Flex daily; multivitamin D3 1,000 units b.i.d.; multivitamin daily; PreserVision daily; calcium +D 1,250 mg daily; fluticasone nasal spray; flaxseed oil 1,000 mg daily; polyethylene glycol 17 gram packet daily p.r.n.; albuterol; CBD oil; Restasis eye drops.      ALLERGIES:  No known drug allergies.       Review of Systems  · Cardiovascular  o Admits  o : swelling (feet, ankles, hands)  o Denies  o : palpitations (fast, fluttering, or skipping beats), shortness of breath while walking or lying flat, chest pain or angina pectoris   · Respiratory  o Denies  o :  "chronic or frequent cough      Vitals  Date Time BP Position Site L\R Cuff Size HR RR TEMP (F) WT  HT  BMI kg/m2 BSA m2 O2 Sat FR L/min FiO2 HC       01/11/2021 08:13 /74 Sitting    54 - R   208lbs 0oz 5'  11\" 29.01 2.17       01/11/2021 08:13 /74 Sitting    54 - R                    Home blood pressure readings were reviewed.  Half of them are in the 130s and the other half are in the 140s and some in the 150s, rare 150s and 160s.       Physical Examination  · Constitutional  o Appearance  o : Awake, alert, in no acute distress.  · Eyes  o Conjunctivae  o : Conjunctivae normal.  · Ears, Nose, Mouth and Throat  o Oral Cavity  o :   § Oral Mucosa  § : Normal.  · Neck  o Inspection/Palpation/Auscultation  o : No lymphadenopathy. No JVD. No bruit. Good carotid upstroke.  · Respiratory  o Respiratory  o : Kyphosis with good respiratory effort. Clear to percussion and auscultation.   · Cardiovascular  o Heart  o : PMI is normal. S1, S2 normal. No S3. No S4. Negative systolic/diastolic murmur.   o Peripheral Vascular System  o :   § Extremities  § : Trace to 1+ edema.   · Gastrointestinal  o Abdominal Examination  o : Soft. No masses or tenderness felt. No hepatosplenomegaly. Abdominal aorta is not palpable.  · Labs  o Labs  o : CBC is normal. Chemistry Panel: Creatinine 1.27, GFR 48 which is chronic, HDL 50, LDL 68, triglycerides 53.          Assessment     1.  Hypertension, needs slightly tighter control.  2.  Pedal edema, secondary to pulmonary hypertension and right heart failure, mild, class II.  3.  Chronic kidney disease, stage 3, stable.  4.  Hyperlipidemia, at goal.       Plan     1.  Change losartan to telmisartan 80 mg once a day.  2.  Reduce salt intake in diet.  3.  Do a two-week blood pressure log one week from today.  4.  Follow up in 6 months. We will get a BMP in one month to make sure that with the change in his ARB drug,        his creatinine stays stable.        Paxton Luis MD, " [facility]  PM:vm             Electronically Signed by: Hawa Redman-, Other -Author on January 15, 2021 09:22:03 AM  Electronically Co-signed by: Paxton Luis MD -Reviewer on January 16, 2021 06:35:24 PM

## 2021-05-14 NOTE — PROGRESS NOTES
Progress Note      Patient Name: Vladimir Carter   Patient ID: [id number]   Sex: Male   YOB: 1927    Primary Care Provider: Mango Kumar DO   Referring Provider: Mango Kumar DO    Visit Date: March 8, 2021    Provider: Mango Kumar DO   Location: [facility]   Location Address: [address] [place] [address]  [id number]   Location Phone: [phone]          Chief Complaint  · Six month follow up      History Of Present Illness  Vladimir Carter is a 89 year old /White male who presents for evaluation and treatment of:        Patient presents for follow-up on chronic conditions been 6-month since last seen.,  Had annual on his back on 9/2020 last labs were 2/2021 from cardiology which were unremarkable has some CKD stage III GFR 54 creatinine 1.16 denies any complains of chest palpitations headache or fever    Blood pressures are significantly elevated today would sheila and monitoring at home and following up with continue to be above goal of 150/90 to 140/90.  He takes Eliquis Flomax Lasix losartan metoprolol Myrbetriq, has a history of prostate cancer recommend following up with specialist as recommended       Past Medical History  Disease Name Date Onset Notes   Benign non-nodular prostatic hyperplasia with lower urinary tract symptoms 10/29/2018 --    Bone Density Screening 09/29/2019 Spine-Osteopenia Hips-Osteoporosis   CAD (coronary artery disease) --  --    Chronic pain syndrome --  --    CKD (chronic kidney disease) stage 3, GFR 30-59 ml/min --  --    GERD (gastroesophageal reflux disease) without esophagitis 09/16/2016 --    H/O prostate cancer --  --    HTN (hypertension) --  --    Long term (current) use of anticoagulants --  --    Lupus anticoagulant disorder --  --    BENJA (obstructive sleep apnea) --  --    Osteoarthritis of left knee, end-stage 04/22/2016 --    Osteoporosis --  Osteoporosis in the hips/femur no fracture    Overweight (BMI  25.0-29.9) --  --    Pulmonary HTN --  --    Severe tricuspid regurgitation 5/2018 ECHO (TTE) Diastolic dysfunction, Pulmonary HTN and Severe Tricuspid regurgitation.    Tinea unguium 01/24/2019 --          Past Surgical History  Procedure Name Date Notes   Brachytherapy --  --    Cholecystectomy --  --    Colonoscopy --  --    Cystoscopy --  --    Hernia --  --    Knee replacement, left 5- --    Knee replacement, right --  --    Wrist --  --          Medication List  Name Date Started Instructions   Amitiza 8 mcg oral capsule 04/13/2020 take 1 capsule (8 mcg) by oral route 1 times per day as needed for constipation   amlodipine 5 mg oral tablet 12/15/2020 take 1 tablet (5 mg) by oral route once daily   Calcium 600 + D(3) 600 mg(1,500mg) -400 unit oral tablet 04/13/2020 take 2 tablets by oral route daily for 90 days   cetirizine 10 mg oral tablet 12/15/2020 take 1 tablet (10 mg) by oral route once daily   docusate sodium 100 mg oral tablet 12/15/2020 take 1 tablet (100 mg) by oral route 2 times per day for 90 days   Eliquis 2.5 mg oral tablet 12/15/2020 take 1 tablets (2.5 mg) by oral route 2 times per day   Flomax 0.4 mg oral capsule 11/19/2020 take 1 capsule (0.4 mg) by oral route once daily 1/2 hour following the same meal each day for 90 days   ipratropium-albuterol 0.5 mg-3 mg(2.5 mg base)/3 mL inhalation solution for nebulization 01/07/2020 inhale 3 milliliters by nebulization route 4 times per day and as needed, up to 6 doses per day for 30 days   Lasix 20 mg oral tablet 12/15/2020 take 1 tablet (20 mg) by oral route 2 times per day for 90 days   losartan 100 mg oral tablet 12/15/2020 take 1 tablet (100 mg) by oral route once daily for 90 days   metoprolol succinate 50 mg oral tablet extended release 24 hr 12/15/2020 take 2 tablets (100 mg) by oral route once daily for 90 days   Myrbetriq 25 mg oral tablet extended release 24 hr 03/02/2021 take 1 tablet (25 mg) by oral route once daily swallowing whole  with water. Do not crush, chew and/or divide. for 90 days   oxycodone-acetaminophen 5-325 mg oral tablet  take 1 tablet by oral route every 8 hours   pantoprazole 40 mg oral tablet,delayed release (DR/EC) 12/15/2020 take 1 tablet (40 mg) by oral route once daily for 90 days   potassium chloride 20 mEq oral tablet extended release 12/15/2020 take 1 tablet (20 meq) by oral route once daily with food for 90 days   PreserVision AREDS 7,160-113-100 unit-mg-unit oral tablet  take 1 tablet by oral route daily   telmisartan 80 mg oral tablet  take 1 tablet (80 mg) by oral route once daily         Allergy List  Allergen Name Date Reaction Notes   NO KNOWN DRUG ALLERGIES --  --  --        Allergies Reconciled  Family Medical History  Disease Name Relative/Age Notes   Breast Neoplasm, Malignant  daughter   Heart Disease Mother/   --    Cancer, Unspecified Daughter/   Daughter   Diabetes, unspecified type Daughter/   Daughter   Hypertension Mother/   --          Social History  Finding Status Start/Stop Quantity Notes   Active but no formal exercise --  --/-- --  --    Alcohol Never --/-- --  --    Denies substance abuse --  --/-- --  --    lives with children --  --/-- --  --    lives with spouse --  --/-- --  --    Living will in place --  --/-- --  --    . --  --/-- --  --    Recreational Drug Use Never --/-- --  no   Retired. --  --/-- --  --    Tobacco Never --/-- --  never smoker   Uses seatbelts --  --/-- --  --          Immunizations  NameDate Admin Mfg Trade Name Lot Number Route Inj VIS Given VIS Publication   Hepatitis A12/18/2018 MSD HAVRIX-ADULT [id number] IM RA 12/18/2018 07/20/2016   Comments: Patient tolerated injection well. NDC# 4574-7209-04   Hepatitis A06/18/2018 SKB HAVRIX-ADULT [id number] IM LD 06/18/2018 07/20/2016   Comments: NDC# 84675-150-17. Patient tolerated the injection well with no reaction after a 15 min. wait.   Omsxedvdo20/07/2019 NE FLUZONE-HIGH DOSE [id number] IM RD 10/07/2019    Comments: Pt  "tolerated well   Prevnar 1310/07/2019 WAL PREVNAR 13 [id number] IM LD 10/07/2019    Comments: Pt tolerated well.         Review of Systems  · Constitutional  o Denies  o : fever, fatigue, weight loss, weight gain  · HENT  o Denies  o : sinus pain, nasal congestion, nasal discharge, postnasal drip  · Cardiovascular  o Denies  o : lower extremity edema, claudication, chest pressure, palpitations  · Respiratory  o Denies  o : shortness of breath, wheezing, cough, hemoptysis, dyspnea on exertion  · Gastrointestinal  o Denies  o : nausea, vomiting, diarrhea, constipation, abdominal pain  · Integument  o Denies  o : rash, itching  · Musculoskeletal  o Denies  o : joint pain, joint swelling  · Psychiatric  o Denies  o : anxiety, depression      Vitals  Date Time BP Position Site L\R Cuff Size HR RR TEMP (F) WT  HT  BMI kg/m2 BSA m2 O2 Sat FR L/min FiO2 HC       03/08/2021 08:08 /56 Sitting    90 - R 20 96.8 214lbs 16oz 5'  11\" 29.99 2.21 98 %  21%    03/08/2021 08:08 /54 Sitting                       Physical Examination  · Constitutional  o Appearance  o : no acute distress, well-nourished  · Head and Face  o Head  o :   § Inspection  § : atraumatic, normocephalic  · Ears, Nose, Mouth and Throat  o Ears  o :   § External Ears  § : normal  o Nose  o :   § Intranasal Exam  § : nares patent  o Oral Cavity  o :   § Oral Mucosa  § : moist mucous membranes  · Respiratory  o Respiratory Effort  o : breathing comfortably, symmetric chest rise  o Auscultation of Lungs  o : clear to asculatation bilaterally, no wheezes, rales, or rhonchii  · Cardiovascular  o Heart  o :   § Auscultation of Heart  § : regular rate and rhythm, no murmurs, rubs, or gallops  o Peripheral Vascular System  o :   § Extremities  § : no edema  · Neurologic  o Mental Status Examination  o :   § Orientation  § : grossly oriented to person, place and time  o Gait and Station  o :   § Gait Screening  § : normal gait  · Psychiatric  o General  o : normal " mood and affect          Assessment  · H/O prostate cancer     V10.46/Z85.46  · Long term (current) use of anticoagulants     V58.61/Z79.01  · CAD (coronary artery disease)     414.00/I25.10  · HTN (hypertension)     401.9/I10  · CKD (chronic kidney disease) stage 3, GFR 30-59 ml/min     585.3/N18.30         HTN  *Managing controlled  Continue to monitor at home follow-up if no improvement  Needs to continue medicines as prescribed  Goal is 140/90 if this is it that way at home and follow-up in 3 to 6 months    History of prostate cancer  Continue to monitor and follow-up with specialist denies any symptoms today  Continue with better Flomax and other medicines    Long-term anticoagulation  CAD  CKD  Continue to monitor and follow-up with specialist  Creatinine stable last labs 2020  Recheck every 6 months continue with Eliquis other medicines as prescribed    Follow-up 6 months or concerns next AWV due in 9/2021       Plan  · Orders  o ACO-39: Current medications updated and reviewed (1159F, ) - 401.9/I10, V10.46/Z85.46, V58.61/Z79.01, 414.00/I25.10 - 03/08/2021  · Medications  o Medications have been Reconciled  o Transition of Care or Provider Policy  · Instructions  o Patient was educated/instructed on their diagnosis, treatment and medications prior to discharge from the clinic today.  o Patient was instructed to exercise regularly.  o Patient instructed to seek medical attention urgently for new or worsening symptoms.  o Trusted Web sites were provided.  o Call the office with any concerns or questions.  o Risks, benefits, and alternatives were discussed with the patient. The patient is aware of risks associated with:  o Chronic conditions reviewed and taken into consideration for today's treatment plan.  · Disposition  o Call or Return if symptoms worsen or persist.  o Labs before follow up ordered  o Reviewed chart labs and imaging prior to and during encounter, updated  o Return Visit Request in/on 6  months +/- 7 days ([id number]).            Electronically Signed by: Mango Kumar DO -Author on March 14, 2021 04:35:53 PM

## 2021-05-14 NOTE — PROGRESS NOTES
Progress Note      Patient Name: Vladimir Carter   Patient ID: [id number]   Sex: Male   YOB: 1927    Primary Care Provider: Mango Kumar DO   Referring Provider: Mango Kumar DO    Visit Date: February 24, 2021    Provider: Enzo Ferrell DPM   Location: [facility]   Location Address: [address] [place] [address]  [id number]   Location Phone: [phone]          Chief Complaint  · Routine Foot Care Visit      History Of Present Illness  Vladimir Carter complains of painful, elongated toenails which are thickened, yellowed, chalky, and cause pain with shoe gear and ambulation.      New, Established, New Problem:  Established   Location:  Toenails  Duration:   Greater than five years  Onset:  Gradual  Nature:  Sore with palpation.  Stable, worsening, improving:   stable  Aggravating factors:  Pain with shoe gear and ambulation.  Previous Treatment:  Debridement    Patient denies any fevers, chills, nausea, vomiting, nor any other constitutional signs nor symptoms.    Patient reports that no changes in their medications with their recent appointment with their primary care provider.       Past Medical History  Benign non-nodular prostatic hyperplasia with lower urinary tract symptoms; Bone Density Screening; CAD (coronary artery disease); Chronic pain syndrome; CKD (chronic kidney disease) stage 3, GFR 30-59 ml/min; GERD (gastroesophageal reflux disease) without esophagitis; H/O prostate cancer; HTN (hypertension); Long term (current) use of anticoagulants; Lupus anticoagulant disorder; BENJA (obstructive sleep apnea); Osteoarthritis of left knee, end-stage; Osteoporosis; Overweight (BMI 25.0-29.9); Pulmonary HTN; Severe tricuspid regurgitation; Tinea unguium         Past Surgical History  Brachytherapy; Cholecystectomy; Colonoscopy; Cystoscopy; Hernia; Knee replacement, left; Knee replacement, right; Wrist         Medication List  Amitiza 8 mcg oral capsule; amlodipine 5  Refill Authorization Note     is requesting a refill authorization.    Brief assessment and rationale for refill: APPROVE: prr               Medication reconciliation completed: No                         Comments:   Requested Prescriptions   Signed Prescriptions Disp Refills    FLUoxetine 20 MG capsule 90 capsule 0     Sig: TAKE 1 CAPSULE DAILY       Psychiatry:  Antidepressants - SSRI Passed - 4/19/2020  6:20 AM        Passed - Patient is at least 18 years old        Passed - Office visit in past 6 months or future 90 days.     Recent Outpatient Visits            1 week ago Ankylosing spondylitis, unspecified site of spine    [facility] Debra Urban PA-C    2 months ago Hyperlipidemia, unspecified hyperlipidemia type    [facility] Pedro Gleason MD    3 months ago Diabetic polyneuropathy associated with type 2 diabetes mellitus    [facility] Brandon Atkinson MD    3 months ago Ankylosing spondylitis, unspecified site of spine    [facility] Morro Rockwell MD    6 months ago Ankylosing spondylitis, unspecified site of spine    [facility] Debra Urban PA-C          Future Appointments              In 1 week Kirstie Israel PA-C [facility]    In 2 months URINE [facility] [place]    In 2 months LAB, ANIYA [facility] [place]    In 2 months [facility] Atkinson MD [facility]    In 2 months [facility]    In 5 months Debra Urban PA-C [facility]                 Appointments  past 12m or future 3m with PCP    Date Provider   Last Visit   1/14/2020 Brandon Atkinson MD   Next Visit   7/14/2020 Brandon Atkinson MD   .  ED visits in past 90 days: 0       Note composed:8:16 AM 04/23/2020    mg oral tablet; Calcium 600 + D(3) 600 mg(1,500mg) -400 unit oral tablet; cetirizine 10 mg oral tablet; docusate sodium 100 mg oral tablet; Eliquis 2.5 mg oral tablet; Flomax 0.4 mg oral capsule; ipratropium-albuterol 0.5 mg-3 mg(2.5 mg base)/3 mL inhalation solution for nebulization; Lasix 20 mg oral tablet; losartan 100 mg oral tablet; metoprolol succinate 50 mg oral tablet extended release 24 hr; Myrbetriq 25 mg oral tablet extended release 24 hr; pantoprazole 40 mg oral tablet,delayed release (DR/EC); potassium chloride 20 mEq oral tablet extended release; PreserVision AREDS 7,160-113-100 unit-mg-unit oral tablet         Allergy List  NO KNOWN DRUG ALLERGIES       Allergies Reconciled  Family Medical History  Breast Neoplasm, Malignant; Heart Disease; Cancer, Unspecified; Diabetes, unspecified type; Hypertension         Social History  Active but no formal exercise; Alcohol (Never); Denies substance abuse; lives with children; lives with spouse; Living will in place; .; Recreational Drug Use (Never); Retired.; Tobacco (Never); Uses seatbelts         Immunizations  Name Date Admin   Hepatitis A 12/18/2018   Hepatitis A 06/18/2018   Influenza 10/07/2019   Influenza 10/15/2018   Influenza 09/26/2017   Influenza 09/29/2016   Influenza 10/16/2015   Influenza 09/22/2014   Prevnar 13 10/07/2019         Review of Systems  · Constitutional  o Denies  o : fatigue, night sweats  · Eyes  o Denies  o : double vision, blurred vision  · HENT  o Denies  o : vertigo, recent head injury  · Cardiovascular  o Denies  o : chest pain, irregular heart beats  · Respiratory  o Denies  o : shortness of breath, productive cough  · Gastrointestinal  o Denies  o : nausea, vomiting  · Genitourinary  o Denies  o : dysuria, urinary retention  · Integument  o * See HPI  · Neurologic  o Denies  o : altered mental status, seizures  · Musculoskeletal  o Denies  o : joint swelling, limitation of motion  · Endocrine  o Denies  o : cold  "intolerance, heat intolerance  · Heme-Lymph  o Denies  o : petechiae, lymph node enlargement or tenderness  · Allergic-Immunologic  o Denies  o : frequent illnesses      Vitals  Date Time BP Position Site L\R Cuff Size HR RR TEMP (F) WT  HT  BMI kg/m2 BSA m2 O2 Sat FR L/min FiO2 HC       02/24/2021 02:42 /43 Sitting    55 - R  97.1 212lbs 0oz 5'  11\" 29.57 2.19 93 %      02/24/2021 02:42 /46 Sitting                       Physical Examination  · Constitutional  o Appearance  o : well-nourished, well developed, no obvious deformities present, appears to be chronically ill. Patient uses a cane for assistance with ambulation. He is very hard of hearing.  · Eyes  o Vision  o : Patient wearing glasses.  · Respiratory  o Respiratory Effort  o : No labored breathing. Good respiratory effort.   · Cardiovascular  o Peripheral Vascular System  o :   § Pedal Pulses  § : Pedal Pulses are 2+ and symmetrical  § Extremities  § : There is no edema of the lower extremities  · Musculoskeletal  o Extremeties/Joint  o : Lower extremity muscle strength and range of motion is equal and symmetrical bilaterally. The knees are noted to be in normal alignment. Ankle alignment and range of motion is normal and foot structure is normal.  · Skin and Subcutaneous Tissue  o General Inspection  o : no lesions present, no areas of discoloration, skin turgor normal, texture normal  · Neurologic  o Sensation  o : Sharp/dull sensation is within normal limits bilaterally. [place] 5.07 monofilament intact to all assessed areas bilaterally.   · Toes  o Toes: Right Foot  o :   § Toenails  § : Toenails are hypertrophic, mycotic, dystrophic, brittle toenail(s) at nail 1, 2, 3, 4, 5 with onycholysis of the right foot. The 1st, 2nd, 3rd, 4th, 5th toenail(s) on the right have 2 mm in thickness with subungual detritus. There is an incurvated toenail at the distal border of the 1st, 2nd, 3rd, 4th, 5th toe  o Toes: Left Foot  o : "   § Toenails  § : There are hypertrophic, mycotic, dystrophic, brittle toenail(s) at the 1, 2, 3, 4, 5 with onycholysis of the left foot. The 1st, 2nd, 3rd, 4th, 5th toenail(s) on the left have 2 mm in thickness with subungual detritus. There is an incurvated toenail at the distal border of the 1st, 2nd, 3rd, 4th, 5th toe  · Procedures  o Nail Debridement  o : Nail debridement is indicated for the following toenails:, left hallux, left 2nd toe, left 3rd toe, left 4th toe, left 5th toe, right hallux, right 2nd toe, right 3rd toe, right 4th toe, right 5th toe. The nail was debrided of excessive thickness to appropriate levels of comfort and contour using, nail nippers. The procedure was without complications          Assessment  · Foot pain, left     729.5/M79.672  · Foot pain, right     729.5/M79.671  · Ingrowing nail     703.0/L60.0  · Tinea unguium     110.1/B35.1  · Difficulty walking     719.7/R26.2      Plan  · Orders  o Debridement of six or more nails (21692, 22995, 99410, 21003, 16353, 17619, 46567, 28649) - 110.1/B35.1, 729.5/M79.672, 729.5/M79.671, 719.7/R26.2 - 02/24/2021  · Medications  o Medications have been Reconciled  o Transition of Care or Provider Policy  · Instructions  o I have discussed the findings of this evaluation with the patient. The discussion included a complete verbal explanation of any changes in the examination results, diagnosis, and the current treatment plan. A schedule for future care needs was explained. If any questions should arise after returning home, I have encouraged the patient to feel free to contact Dr. Ferrell. The patient states understanding and agreement with this plan.   o Patient is to monitor for problems and to contact Dr. Ferrell for follow-up should such signs occur. Patient states understanding and agreement with this plan.   o Follow up in 9 weeks for Routine Foot Care.   o Encouraged to follow-up with Primary Care Provider for preventative care.    o Electronically Identified Patient Education Materials Provided Electronically  · Disposition  o Call or Return if symptoms worsen or persist.            Electronically Signed by: Enzo Ferrell DPM -Author on February 24, 2021 03:02:49 PM

## 2021-05-15 VITALS — DIASTOLIC BLOOD PRESSURE: 63 MMHG | WEIGHT: 203 LBS | SYSTOLIC BLOOD PRESSURE: 132 MMHG

## 2021-05-15 VITALS
HEART RATE: 62 BPM | WEIGHT: 214.37 LBS | OXYGEN SATURATION: 94 % | DIASTOLIC BLOOD PRESSURE: 53 MMHG | HEIGHT: 71 IN | SYSTOLIC BLOOD PRESSURE: 162 MMHG | TEMPERATURE: 97.5 F | BODY MASS INDEX: 30.01 KG/M2

## 2021-05-15 VITALS — HEIGHT: 71 IN | WEIGHT: 193.25 LBS | BODY MASS INDEX: 27.06 KG/M2 | RESPIRATION RATE: 16 BRPM

## 2021-05-15 VITALS — DIASTOLIC BLOOD PRESSURE: 68 MMHG | SYSTOLIC BLOOD PRESSURE: 155 MMHG

## 2021-05-15 VITALS
WEIGHT: 201 LBS | SYSTOLIC BLOOD PRESSURE: 177 MMHG | HEIGHT: 71 IN | OXYGEN SATURATION: 98 % | BODY MASS INDEX: 28.14 KG/M2 | DIASTOLIC BLOOD PRESSURE: 69 MMHG | HEART RATE: 51 BPM

## 2021-05-15 VITALS
HEIGHT: 71 IN | HEART RATE: 46 BPM | WEIGHT: 195 LBS | BODY MASS INDEX: 27.3 KG/M2 | SYSTOLIC BLOOD PRESSURE: 178 MMHG | DIASTOLIC BLOOD PRESSURE: 48 MMHG

## 2021-05-15 VITALS
DIASTOLIC BLOOD PRESSURE: 54 MMHG | HEIGHT: 71 IN | BODY MASS INDEX: 27.58 KG/M2 | WEIGHT: 197 LBS | HEART RATE: 53 BPM | SYSTOLIC BLOOD PRESSURE: 188 MMHG | OXYGEN SATURATION: 97 %

## 2021-05-15 VITALS
HEART RATE: 62 BPM | DIASTOLIC BLOOD PRESSURE: 58 MMHG | HEIGHT: 71 IN | OXYGEN SATURATION: 96 % | WEIGHT: 208 LBS | BODY MASS INDEX: 29.12 KG/M2 | SYSTOLIC BLOOD PRESSURE: 140 MMHG

## 2021-05-15 VITALS
TEMPERATURE: 97.5 F | BODY MASS INDEX: 28.07 KG/M2 | WEIGHT: 200.5 LBS | DIASTOLIC BLOOD PRESSURE: 64 MMHG | HEART RATE: 53 BPM | RESPIRATION RATE: 20 BRPM | HEIGHT: 71 IN | SYSTOLIC BLOOD PRESSURE: 161 MMHG | OXYGEN SATURATION: 97 %

## 2021-05-15 VITALS
WEIGHT: 194 LBS | SYSTOLIC BLOOD PRESSURE: 177 MMHG | DIASTOLIC BLOOD PRESSURE: 68 MMHG | BODY MASS INDEX: 27.16 KG/M2 | TEMPERATURE: 98 F | HEIGHT: 71 IN | HEART RATE: 68 BPM | RESPIRATION RATE: 18 BRPM | OXYGEN SATURATION: 98 %

## 2021-05-15 VITALS
BODY MASS INDEX: 29.12 KG/M2 | HEART RATE: 54 BPM | WEIGHT: 208 LBS | HEIGHT: 71 IN | DIASTOLIC BLOOD PRESSURE: 64 MMHG | SYSTOLIC BLOOD PRESSURE: 176 MMHG

## 2021-05-15 VITALS — RESPIRATION RATE: 12 BRPM | WEIGHT: 194.06 LBS | BODY MASS INDEX: 27.17 KG/M2 | HEIGHT: 71 IN

## 2021-05-15 VITALS
HEIGHT: 71 IN | DIASTOLIC BLOOD PRESSURE: 76 MMHG | WEIGHT: 194 LBS | SYSTOLIC BLOOD PRESSURE: 182 MMHG | HEART RATE: 54 BPM | BODY MASS INDEX: 27.16 KG/M2

## 2021-05-16 VITALS
HEART RATE: 76 BPM | SYSTOLIC BLOOD PRESSURE: 158 MMHG | BODY MASS INDEX: 28.7 KG/M2 | WEIGHT: 205 LBS | HEIGHT: 71 IN | DIASTOLIC BLOOD PRESSURE: 66 MMHG

## 2021-05-16 VITALS
OXYGEN SATURATION: 97 % | DIASTOLIC BLOOD PRESSURE: 54 MMHG | HEIGHT: 71 IN | BODY MASS INDEX: 26.49 KG/M2 | TEMPERATURE: 97.6 F | SYSTOLIC BLOOD PRESSURE: 197 MMHG | HEART RATE: 50 BPM | RESPIRATION RATE: 20 BRPM | WEIGHT: 189.19 LBS

## 2021-05-16 VITALS
HEIGHT: 71 IN | HEART RATE: 111 BPM | BODY MASS INDEX: 27.74 KG/M2 | OXYGEN SATURATION: 88 % | TEMPERATURE: 97 F | DIASTOLIC BLOOD PRESSURE: 68 MMHG | RESPIRATION RATE: 36 BRPM | WEIGHT: 198.12 LBS | SYSTOLIC BLOOD PRESSURE: 142 MMHG

## 2021-05-16 VITALS — OXYGEN SATURATION: 85 % | BODY MASS INDEX: 28.28 KG/M2 | HEART RATE: 103 BPM | WEIGHT: 202 LBS | HEIGHT: 71 IN

## 2021-05-16 VITALS
HEART RATE: 78 BPM | RESPIRATION RATE: 20 BRPM | WEIGHT: 208.19 LBS | HEIGHT: 71 IN | SYSTOLIC BLOOD PRESSURE: 151 MMHG | TEMPERATURE: 97.4 F | BODY MASS INDEX: 29.15 KG/M2 | OXYGEN SATURATION: 96 % | DIASTOLIC BLOOD PRESSURE: 60 MMHG

## 2021-05-16 VITALS — BODY MASS INDEX: 27.45 KG/M2 | HEIGHT: 71 IN | WEIGHT: 196.06 LBS | RESPIRATION RATE: 22 BRPM

## 2021-05-16 VITALS
HEART RATE: 89 BPM | OXYGEN SATURATION: 96 % | BODY MASS INDEX: 29.41 KG/M2 | WEIGHT: 210.06 LBS | DIASTOLIC BLOOD PRESSURE: 75 MMHG | RESPIRATION RATE: 18 BRPM | HEIGHT: 71 IN | TEMPERATURE: 97.8 F | SYSTOLIC BLOOD PRESSURE: 150 MMHG

## 2021-05-16 VITALS
RESPIRATION RATE: 20 BRPM | SYSTOLIC BLOOD PRESSURE: 176 MMHG | HEIGHT: 71 IN | WEIGHT: 195.5 LBS | HEART RATE: 64 BPM | TEMPERATURE: 97.4 F | BODY MASS INDEX: 27.37 KG/M2 | OXYGEN SATURATION: 95 % | DIASTOLIC BLOOD PRESSURE: 62 MMHG

## 2021-05-16 VITALS
HEIGHT: 71 IN | WEIGHT: 192 LBS | BODY MASS INDEX: 26.88 KG/M2 | OXYGEN SATURATION: 99 % | HEART RATE: 51 BPM | DIASTOLIC BLOOD PRESSURE: 49 MMHG | SYSTOLIC BLOOD PRESSURE: 179 MMHG

## 2021-05-16 VITALS
HEIGHT: 71 IN | SYSTOLIC BLOOD PRESSURE: 195 MMHG | OXYGEN SATURATION: 96 % | HEART RATE: 58 BPM | BODY MASS INDEX: 26.9 KG/M2 | DIASTOLIC BLOOD PRESSURE: 57 MMHG | TEMPERATURE: 98 F | RESPIRATION RATE: 20 BRPM | WEIGHT: 192.12 LBS

## 2021-05-16 VITALS
SYSTOLIC BLOOD PRESSURE: 144 MMHG | BODY MASS INDEX: 28.42 KG/M2 | HEART RATE: 74 BPM | OXYGEN SATURATION: 89 % | WEIGHT: 203 LBS | OXYGEN SATURATION: 88 % | HEIGHT: 71 IN | DIASTOLIC BLOOD PRESSURE: 76 MMHG | TEMPERATURE: 97.8 F | RESPIRATION RATE: 24 BRPM

## 2021-05-16 VITALS — RESPIRATION RATE: 16 BRPM | BODY MASS INDEX: 26.25 KG/M2 | HEIGHT: 71 IN | WEIGHT: 187.5 LBS

## 2021-05-16 VITALS
WEIGHT: 195.56 LBS | BODY MASS INDEX: 27.38 KG/M2 | HEART RATE: 70 BPM | HEIGHT: 71 IN | DIASTOLIC BLOOD PRESSURE: 60 MMHG | SYSTOLIC BLOOD PRESSURE: 160 MMHG | OXYGEN SATURATION: 96 % | RESPIRATION RATE: 24 BRPM | TEMPERATURE: 96.5 F

## 2021-05-16 VITALS
SYSTOLIC BLOOD PRESSURE: 146 MMHG | WEIGHT: 205 LBS | HEART RATE: 56 BPM | HEIGHT: 71 IN | BODY MASS INDEX: 28.7 KG/M2 | DIASTOLIC BLOOD PRESSURE: 64 MMHG

## 2021-05-16 VITALS
HEIGHT: 71 IN | OXYGEN SATURATION: 95 % | BODY MASS INDEX: 28.99 KG/M2 | DIASTOLIC BLOOD PRESSURE: 66 MMHG | WEIGHT: 207.06 LBS | TEMPERATURE: 98.1 F | RESPIRATION RATE: 20 BRPM | HEART RATE: 74 BPM | SYSTOLIC BLOOD PRESSURE: 144 MMHG

## 2021-05-16 VITALS
TEMPERATURE: 96.8 F | HEIGHT: 71 IN | HEART RATE: 79 BPM | RESPIRATION RATE: 20 BRPM | WEIGHT: 207.06 LBS | OXYGEN SATURATION: 97 % | BODY MASS INDEX: 28.99 KG/M2 | SYSTOLIC BLOOD PRESSURE: 159 MMHG | DIASTOLIC BLOOD PRESSURE: 59 MMHG

## 2021-05-16 VITALS
TEMPERATURE: 97.8 F | BODY MASS INDEX: 29.32 KG/M2 | HEIGHT: 71 IN | WEIGHT: 209.44 LBS | RESPIRATION RATE: 20 BRPM | SYSTOLIC BLOOD PRESSURE: 166 MMHG | OXYGEN SATURATION: 94 % | DIASTOLIC BLOOD PRESSURE: 61 MMHG | HEART RATE: 72 BPM

## 2021-05-16 VITALS — HEIGHT: 71 IN | WEIGHT: 202 LBS | BODY MASS INDEX: 28.28 KG/M2 | RESPIRATION RATE: 14 BRPM

## 2021-05-16 VITALS
RESPIRATION RATE: 20 BRPM | SYSTOLIC BLOOD PRESSURE: 130 MMHG | HEIGHT: 71 IN | DIASTOLIC BLOOD PRESSURE: 84 MMHG | WEIGHT: 201.31 LBS | HEART RATE: 82 BPM | BODY MASS INDEX: 28.18 KG/M2 | TEMPERATURE: 97.6 F | OXYGEN SATURATION: 98 %

## 2021-05-16 VITALS
HEIGHT: 71 IN | WEIGHT: 204 LBS | SYSTOLIC BLOOD PRESSURE: 166 MMHG | DIASTOLIC BLOOD PRESSURE: 66 MMHG | BODY MASS INDEX: 28.56 KG/M2 | HEART RATE: 50 BPM

## 2021-05-16 VITALS — HEIGHT: 71 IN | BODY MASS INDEX: 28.49 KG/M2 | RESPIRATION RATE: 9 BRPM | WEIGHT: 203.5 LBS

## 2021-05-16 VITALS
TEMPERATURE: 97.4 F | WEIGHT: 204.19 LBS | HEART RATE: 88 BPM | RESPIRATION RATE: 20 BRPM | DIASTOLIC BLOOD PRESSURE: 64 MMHG | OXYGEN SATURATION: 95 % | SYSTOLIC BLOOD PRESSURE: 159 MMHG

## 2021-05-16 VITALS — OXYGEN SATURATION: 94 % | WEIGHT: 198 LBS | BODY MASS INDEX: 27.72 KG/M2 | HEART RATE: 83 BPM | HEIGHT: 71 IN

## 2021-05-21 ENCOUNTER — OFFICE VISIT CONVERTED (OUTPATIENT)
Dept: FAMILY MEDICINE CLINIC | Facility: CLINIC | Age: 86
End: 2021-05-21
Attending: FAMILY MEDICINE

## 2021-05-22 ENCOUNTER — TRANSCRIBE ORDERS (OUTPATIENT)
Dept: CARDIOLOGY | Facility: CLINIC | Age: 86
End: 2021-05-22

## 2021-05-22 DIAGNOSIS — I27.20 PULMONARY HYPERTENSION (HCC): Primary | ICD-10-CM

## 2021-05-24 ENCOUNTER — HOSPITAL ENCOUNTER (OUTPATIENT)
Dept: GENERAL RADIOLOGY | Facility: HOSPITAL | Age: 86
Discharge: HOME | End: 2021-05-24
Attending: FAMILY MEDICINE

## 2021-05-25 ENCOUNTER — PROCEDURE VISIT CONVERTED (OUTPATIENT)
Dept: PODIATRY | Facility: CLINIC | Age: 86
End: 2021-05-25
Attending: PODIATRIST

## 2021-06-05 NOTE — PROGRESS NOTES
Progress Note      Patient Name: Vladimir Carter   Patient ID: [id number]   Sex: Male   YOB: 1927    Primary Care Provider: Mango Kumar DO   Referring Provider: Mango Kumar DO    Visit Date: May 25, 2021    Provider: Enzo Ferrell DPM   Location: [facility]   Location Address: [address] [place] [address]  [id number]   Location Phone: [phone]          Chief Complaint  · Routine Foot Care Visit      History Of Present Illness  Vladimir Carter complains of painful, elongated toenails which are thickened, yellowed, chalky, and cause pain with shoe gear and ambulation.      New, Established, New Problem:  Established   Location:  Toenails  Duration:   Greater than five years  Onset:  Gradual  Nature:  Sore with palpation.  Stable, worsening, improving:   stable  Aggravating factors:  Pain with shoe gear and ambulation.  Previous Treatment:  Debridement    Patient denies any fevers, chills, nausea, vomiting, nor any other constitutional signs nor symptoms.    Patient reports the following medical changes since their last visit:    - took COVID-19 vaccination       Past Medical History  Benign non-nodular prostatic hyperplasia with lower urinary tract symptoms; Bone Density Screening; CAD (coronary artery disease); Chronic pain syndrome; CKD (chronic kidney disease) stage 3, GFR 30-59 ml/min; GERD (gastroesophageal reflux disease) without esophagitis; H/O prostate cancer; HTN (hypertension); Long term (current) use of anticoagulants; Lupus anticoagulant disorder; BENJA (obstructive sleep apnea); Osteoarthritis of left knee, end-stage; Osteoporosis; Overweight (BMI 25.0-29.9); Pulmonary HTN; Severe tricuspid regurgitation; Tinea unguium         Past Surgical History  Brachytherapy; Cholecystectomy; Colonoscopy; Cystoscopy; Hernia; Knee replacement, left; Knee replacement, right; Wrist         Medication List  amlodipine 5 mg oral tablet; Calcium 600 + D(3) 600  mg(1,500mg) -400 unit oral tablet; cetirizine 10 mg oral tablet; docusate sodium 100 mg oral tablet; Eliquis 2.5 mg oral tablet; Flomax 0.4 mg oral capsule; Lasix 20 mg oral tablet; losartan 100 mg oral tablet; metoprolol succinate 50 mg oral tablet extended release 24 hr; Myrbetriq 25 mg oral tablet extended release 24 hr; oxycodone-acetaminophen 5-325 mg oral tablet; pantoprazole 40 mg oral tablet,delayed release (DR/EC); potassium chloride 20 mEq oral tablet extended release; PreserVision AREDS 7,160-113-100 unit-mg-unit oral tablet; telmisartan 80 mg oral tablet         Allergy List  NO KNOWN DRUG ALLERGIES       Allergies Reconciled  Family Medical History  Breast Neoplasm, Malignant; Heart Disease; Cancer, Unspecified; Diabetes, unspecified type; Hypertension         Social History  Active but no formal exercise; Alcohol (Never); Denies substance abuse; lives with children; lives with spouse; Living will in place; .; Recreational Drug Use (Never); Retired.; Tobacco (Never); Uses seatbelts         Immunizations  Name Date Admin   Hepatitis A 12/18/2018   Hepatitis A 06/18/2018   Influenza 10/07/2019   Influenza 10/15/2018   Influenza 09/26/2017   Influenza 09/29/2016   Influenza 10/16/2015   Influenza 09/22/2014   Prevnar 13 10/07/2019         Review of Systems  · Constitutional  o Denies  o : fatigue, night sweats  · Eyes  o Denies  o : double vision, blurred vision  · HENT  o Denies  o : vertigo, recent head injury  · Cardiovascular  o Denies  o : chest pain, irregular heart beats  · Respiratory  o Denies  o : shortness of breath, productive cough  · Gastrointestinal  o Denies  o : nausea, vomiting  · Genitourinary  o Denies  o : dysuria, urinary retention  · Integument  o * See HPI  · Neurologic  o Denies  o : altered mental status, seizures  · Musculoskeletal  o Denies  o : joint swelling, limitation of motion  · Endocrine  o Denies  o : cold intolerance, heat intolerance  · Heme-Lymph  o Denies  o :  "petechiae, lymph node enlargement or tenderness  · Allergic-Immunologic  o Denies  o : frequent illnesses      Vitals  Date Time BP Position Site L\R Cuff Size HR RR TEMP (F) WT  HT  BMI kg/m2 BSA m2 O2 Sat FR L/min FiO2 HC       05/25/2021 12:54 /53 Sitting    55 - R  96.7 212lbs 0oz 5'  11\" 29.57 2.19 96 %            Physical Examination  · Constitutional  o Appearance  o : well-nourished, well developed, no obvious deformities present, appears to be chronically ill. Patient uses a cane for assistance with ambulation. He is very hard of hearing.  · Eyes  o Vision  o : Patient wearing glasses.  · Respiratory  o Respiratory Effort  o : No labored breathing. Good respiratory effort.   · Cardiovascular  o Peripheral Vascular System  o :   § Pedal Pulses  § : Pedal Pulses are 2+ and symmetrical  § Extremities  § : There is no edema of the lower extremities  · Musculoskeletal  o Extremeties/Joint  o : Lower extremity muscle strength and range of motion is equal and symmetrical bilaterally. The knees are noted to be in normal alignment. Ankle alignment and range of motion is normal and foot structure is normal.  · Skin and Subcutaneous Tissue  o General Inspection  o : no lesions present, no areas of discoloration, skin turgor normal, texture normal  · Neurologic  o Sensation  o : Sharp/dull sensation is within normal limits bilaterally. Lula-Martin 5.07 monofilament intact to all assessed areas bilaterally.   · Toes  o Toes: Right Foot  o :   § Toenails  § : Toenails are hypertrophic, mycotic, dystrophic, brittle toenail(s) at nail 1, 2, 3, 4, 5 with onycholysis of the right foot. The 1st, 2nd, 3rd, 4th, 5th toenail(s) on the right have 2 mm in thickness with subungual detritus. There is an incurvated toenail at the distal border of the 1st, 2nd, 3rd, 4th, 5th toe  o Toes: Left Foot  o :   § Toenails  § : There are hypertrophic, mycotic, dystrophic, brittle toenail(s) at the 1, 2, 3, 4, 5 with onycholysis of " the left foot. The 1st, 2nd, 3rd, 4th, 5th toenail(s) on the left have 2 mm in thickness with subungual detritus. There is an incurvated toenail at the distal border of the 1st, 2nd, 3rd, 4th, 5th toe  · Procedures  o Nail Debridement  o : Nail debridement is indicated for the following toenails:, left hallux, left 2nd toe, left 3rd toe, left 4th toe, left 5th toe, right hallux, right 2nd toe, right 3rd toe, right 4th toe, right 5th toe. The nail was debrided of excessive thickness to appropriate levels of comfort and contour using, nail nippers. The procedure was without complications          Assessment  · Foot pain, left     729.5/M79.672  · Foot pain, right     729.5/M79.671  · Ingrowing nail     703.0/L60.0  · Tinea unguium     110.1/B35.1  · Difficulty walking     719.7/R26.2      Plan  · Orders  o Debridement of six or more nails (64663, 49724, 61959, 52663, 38307, 76286, 13359, 86010, 01782) - 110.1/B35.1, 729.5/M79.672, 729.5/M79.671, 719.7/R26.2 - 05/25/2021  · Medications  o Medications have been Reconciled  o Transition of Care or Provider Policy  · Instructions  o I have discussed the findings of this evaluation with the patient. The discussion included a complete verbal explanation of any changes in the examination results, diagnosis, and the current treatment plan. A schedule for future care needs was explained. If any questions should arise after returning home, I have encouraged the patient to feel free to contact Dr. Ferrell. The patient states understanding and agreement with this plan.   o Patient is to monitor for problems and to contact Dr. Ferrell for follow-up should such signs occur. Patient states understanding and agreement with this plan.   o Follow up in 9 weeks for Routine Foot Care.   o Encouraged to follow-up with Primary Care Provider for preventative care.   o Electronically Identified Patient Education Materials Provided Electronically  · Disposition  o Call or Return if symptoms  worsen or persist.            Electronically Signed by: Enzo Ferrell DPM -Author on May 25, 2021 01:08:47 PM

## 2021-06-05 NOTE — PROGRESS NOTES
Progress Note      Patient Name: Vladimir Carter   Patient ID: [id number]   Sex: Male   YOB: 1927    Primary Care Provider: Mango Kumar DO   Referring Provider: Mango Kumar DO    Visit Date: May 21, 2021    Provider: Mango Kumar DO   Location: [facility]   Location Address: [address] [place] [address]  [id number]   Location Phone: [phone]          Chief Complaint  · Bilateral leg swelling and it is worse in right leg  · Low back pain   · Walking slower      History Of Present Illness  Vladimir Carter is a 89 year old /White male who presents for evaluation and treatment of:        Complaining of leg pain backside of his right leg history of clots he is on Eliquis his daughter is with him and would like to have a study done to evaluate for DVT.    He also has a longstanding history of pain history of prostate cancer was going to pain management does not feel like this is helping we will do Toradol shot to help a follow-up after the study to evaluate long-term pain goals and other       Past Medical History  Disease Name Date Onset Notes   Benign non-nodular prostatic hyperplasia with lower urinary tract symptoms 10/29/2018 --    Bone Density Screening 09/29/2019 Spine-Osteopenia Hips-Osteoporosis   CAD (coronary artery disease) --  --    Chronic pain syndrome --  --    CKD (chronic kidney disease) stage 3, GFR 30-59 ml/min --  --    GERD (gastroesophageal reflux disease) without esophagitis 09/16/2016 --    H/O prostate cancer --  --    HTN (hypertension) --  --    Long term (current) use of anticoagulants --  --    Lupus anticoagulant disorder --  --    BENJA (obstructive sleep apnea) --  --    Osteoarthritis of left knee, end-stage 04/22/2016 --    Osteoporosis --  Osteoporosis in the hips/femur no fracture    Overweight (BMI 25.0-29.9) --  --    Pulmonary HTN --  --    Severe tricuspid regurgitation 5/2018 ECHO (TTE) Diastolic dysfunction,  Pulmonary HTN and Severe Tricuspid regurgitation.    Tinea unguium 01/24/2019 --          Past Surgical History  Procedure Name Date Notes   Brachytherapy --  --    Cholecystectomy --  --    Colonoscopy --  --    Cystoscopy --  --    Hernia --  --    Knee replacement, left 5- --    Knee replacement, right --  --    Wrist --  --          Medication List  Name Date Started Instructions   amlodipine 5 mg oral tablet 03/22/2021 take 1 tablet (5 mg) by oral route once daily   Calcium 600 + D(3) 600 mg(1,500mg) -400 unit oral tablet 04/13/2020 take 2 tablets by oral route daily for 90 days   cetirizine 10 mg oral tablet 03/23/2021 take 1 tablet (10 mg) by oral route once daily for 90 days   docusate sodium 100 mg oral tablet 03/23/2021 take 1 tablet (100 mg) by oral route 2 times per day for 90 days   Eliquis 2.5 mg oral tablet 03/22/2021 take 1 tablets (2.5 mg) by oral route 2 times per day   Flomax 0.4 mg oral capsule 11/19/2020 take 1 capsule (0.4 mg) by oral route once daily 1/2 hour following the same meal each day for 90 days   Lasix 20 mg oral tablet 03/22/2021 take 1 tablet (20 mg) by oral route 2 times per day for 90 days   losartan 100 mg oral tablet 12/15/2020 take 1 tablet (100 mg) by oral route once daily for 90 days   metoprolol succinate 50 mg oral tablet extended release 24 hr 03/23/2021 take 2 tablets (100 mg) by oral route once daily for 90 days   Myrbetriq 25 mg oral tablet extended release 24 hr 03/02/2021 take 1 tablet (25 mg) by oral route once daily swallowing whole with water. Do not crush, chew and/or divide. for 90 days   oxycodone-acetaminophen 5-325 mg oral tablet  take 1 tablet by oral route every 8 hours   pantoprazole 40 mg oral tablet,delayed release (DR/EC) 03/23/2021 take 1 tablet (40 mg) by oral route once daily for 90 days   potassium chloride 20 mEq oral tablet extended release 03/23/2021 take 1 tablet (20 meq) by oral route once daily with food for 90 days   PreserVision AREDS  7,160-113-100 unit-mg-unit oral tablet  take 1 tablet by oral route daily   telmisartan 80 mg oral tablet 03/22/2021 take 1 tablet (80 mg) by oral route once daily         Allergy List  Allergen Name Date Reaction Notes   NO KNOWN DRUG ALLERGIES --  --  --        Allergies Reconciled  Family Medical History  Disease Name Relative/Age Notes   Breast Neoplasm, Malignant  daughter   Heart Disease Mother/   --    Cancer, Unspecified Daughter/   Daughter   Diabetes, unspecified type Daughter/   Daughter   Hypertension Mother/   --          Social History  Finding Status Start/Stop Quantity Notes   Active but no formal exercise --  --/-- --  --    Alcohol Never --/-- --  --    Denies substance abuse --  --/-- --  --    lives with children --  --/-- --  --    lives with spouse --  --/-- --  --    Living will in place --  --/-- --  --    . --  --/-- --  --    Recreational Drug Use Never --/-- --  no   Retired. --  --/-- --  --    Tobacco Never --/-- --  never smoker   Uses seatbelts --  --/-- --  --          Immunizations  NameDate Admin Mfg Trade Name Lot Number Route Inj VIS Given VIS Publication   Hepatitis A12/18/2018 MSD HAVRIX-ADULT [id number] IM RA 12/18/2018 07/20/2016   Comments: Patient tolerated injection well. NDC# 3126-1029-92   Hepatitis A06/18/2018 SKB HAVRIX-ADULT [id number] IM LD 06/18/2018 07/20/2016   Comments: NDC# 48658-377-99. Patient tolerated the injection well with no reaction after a 15 min. wait.   Oqyyjaemm05/07/2019 NE FLUZONE-HIGH DOSE [id number] IM RD 10/07/2019    Comments: Pt tolerated well   Prevnar 1310/07/2019 [facility] PREVNAR 13 [id number] IM LD 10/07/2019    Comments: Pt tolerated well.         Review of Systems  · Constitutional  o * See HPI  · Eyes  o * See HPI  · HENT  o * See HPI  · Breasts  o * See HPI  · Cardiovascular  o * See HPI  · Respiratory  o * See HPI  · Gastrointestinal  o * See HPI  · Genitourinary  o * See HPI  · Integument  o * See HPI  · Neurologic  o * See  "HPI  · Musculoskeletal  o * See HPI  · Endocrine  o * See HPI  · Psychiatric  o * See HPI  · Heme-Lymph  o * See HPI  · Allergic-Immunologic  o * See HPI      Vitals  Date Time BP Position Site L\R Cuff Size HR RR TEMP (F) WT  HT  BMI kg/m2 BSA m2 O2 Sat FR L/min FiO2 HC       05/21/2021 03:44 /53 Sitting    60 - R  97.4 213lbs 6oz 5'  11\" 29.76 2.2 95 %  21%    05/21/2021 03:45 /50 Sitting                       Physical Examination  · Constitutional  o Appearance  o : no acute distress, well-nourished  · Head and Face  o Head  o :   § Inspection  § : atraumatic, normocephalic  o Face  o :   § Inspection  § : no facial lesions  · Ears, Nose, Mouth and Throat  o Ears  o :   § External Ears  § : normal  o Nose  o :   § Intranasal Exam  § : nares patent  o Oral Cavity  o :   § Oral Mucosa  § : moist mucous membranes  · Respiratory  o Respiratory Effort  o : breathing comfortably, symmetric chest rise  o Auscultation of Lungs  o : clear to asculatation bilaterally, no wheezes, rales, or rhonchii  · Cardiovascular  o Heart  o :   § Auscultation of Heart  § : regular rate and rhythm, no murmurs, rubs, or gallops  o Peripheral Vascular System  o :   § Extremities  § : mild lower extremity edema present, no cyanosis, no distal hair loss, normal capillary refill  · Lymphatic  o Neck  o : no lymphadenopathy present  · Neurologic  o Mental Status Examination  o :   § Orientation  § : grossly oriented to person, place and time  o Gait and Station  o :   § Gait Screening  § : normal gait  · Psychiatric  o General  o : normal mood and affect          Assessment  · Long term (current) use of anticoagulants     V58.61/Z79.01  · HTN (hypertension)     401.9/I10  · Leg swelling     729.81/M79.89  · History of embolism     V12.51/Z86.718  · Chronic pain     338.29/G89.29         Order a ultrasound valuation for DVT right lower extremity Toradol injection for pain follow-up after study to further discuss and " treat    Consider treatment of venous insufficiency with compression socks elevation reduce fluid intake and diuretics as appropriate and will do further lab work per above        Given age and risk factor we will do close follow-up on patient and see improvement       Plan  · Orders  o IM - Injection Fee [facility] (63038) - - 05/21/2021  o ACO-39: Current medications updated and reviewed (1159F, ) - V58.61/Z79.01, 729.81/M79.89, V12.51/Z86.718, 338.29/G89.29 - 05/21/2021  o Doppler ultrasound of right lower extremity for venous thromboembolism (70466) - 729.81/M79.89, V12.51/Z86.718 - 05/21/2021  o 4.00 - Toradol Injection 60mg (-0) - 338.29/G89.29 - 05/21/2021   Injection - Toradol 60 mg; Dose: 60 mg; Site: Right Gluteus; Route: intramuscular; Date: 05/21/2021 16:15:38; Exp: 02/01/2022; Lot: [id number]; Mfg: N/A; TradeName: ketorolac; Location: [facility]; Administered By: Nicole Hansen; Comment: Pt tolerated well  · Medications  o Medications have been Reconciled  o Transition of Care or Provider Policy  · Instructions  o Handouts were given to patient:   o Patient is taking medications as prescribed and doing well.   o Take all medications as prescribed/directed.  o Patient was educated/instructed on their diagnosis, treatment and medications prior to discharge from the clinic today.  o Patient instructed to seek medical attention urgently for new or worsening symptoms.  o Trusted Web sites were provided.  o Call the office with any concerns or questions.  o Bring all medicines with their bottles to each office visit.  o Risks, benefits, and alternatives were discussed with the patient. The patient is aware of risks associated with:  o Chronic conditions reviewed and taken into consideration for today's treatment plan.  o Electronically Identified Patient Education Materials Provided Electronically  · Disposition  o Call or Return if symptoms worsen or persist.  o follow up after  procedure as able  o Labs before follow up ordered  o Reviewed chart labs and imaging prior to and during encounter, updated  o Follow up later in week if no better            Electronically Signed by: Mango Kumar DO -Author on June 4, 2021 07:20:12 AM

## 2021-06-29 ENCOUNTER — LAB (OUTPATIENT)
Dept: LAB | Facility: HOSPITAL | Age: 86
End: 2021-06-29

## 2021-06-29 ENCOUNTER — TRANSCRIBE ORDERS (OUTPATIENT)
Dept: LAB | Facility: HOSPITAL | Age: 86
End: 2021-06-29

## 2021-06-29 DIAGNOSIS — Z79.899 ENCOUNTER FOR LONG-TERM (CURRENT) USE OF OTHER MEDICATIONS: ICD-10-CM

## 2021-06-29 DIAGNOSIS — E03.9 HYPOTHYROIDISM, ADULT: ICD-10-CM

## 2021-06-29 DIAGNOSIS — E78.5 HYPERLIPIDEMIA, UNSPECIFIED HYPERLIPIDEMIA TYPE: ICD-10-CM

## 2021-06-29 DIAGNOSIS — E78.5 HYPERLIPIDEMIA, UNSPECIFIED HYPERLIPIDEMIA TYPE: Primary | ICD-10-CM

## 2021-06-29 LAB
ALBUMIN SERPL-MCNC: 4 G/DL (ref 3.5–5.2)
ALBUMIN/GLOB SERPL: 1.7 G/DL
ALP SERPL-CCNC: 62 U/L (ref 39–117)
ALT SERPL W P-5'-P-CCNC: 6 U/L (ref 1–41)
ANION GAP SERPL CALCULATED.3IONS-SCNC: 8 MMOL/L (ref 5–15)
AST SERPL-CCNC: 19 U/L (ref 1–40)
BASOPHILS # BLD AUTO: 0.05 10*3/MM3 (ref 0–0.2)
BASOPHILS NFR BLD AUTO: 0.7 % (ref 0–1.5)
BILIRUB SERPL-MCNC: 0.3 MG/DL (ref 0–1.2)
BUN SERPL-MCNC: 19 MG/DL (ref 8–23)
BUN/CREAT SERPL: 16.4 (ref 7–25)
CALCIUM SPEC-SCNC: 8.5 MG/DL (ref 8.2–9.6)
CHLORIDE SERPL-SCNC: 105 MMOL/L (ref 98–107)
CHOLEST SERPL-MCNC: 127 MG/DL (ref 0–200)
CO2 SERPL-SCNC: 29 MMOL/L (ref 22–29)
CREAT SERPL-MCNC: 1.16 MG/DL (ref 0.76–1.27)
DEPRECATED RDW RBC AUTO: 47.5 FL (ref 37–54)
EOSINOPHIL # BLD AUTO: 0.21 10*3/MM3 (ref 0–0.4)
EOSINOPHIL NFR BLD AUTO: 2.7 % (ref 0.3–6.2)
ERYTHROCYTE [DISTWIDTH] IN BLOOD BY AUTOMATED COUNT: 13.3 % (ref 12.3–15.4)
GFR SERPL CREATININE-BSD FRML MDRD: 59 ML/MIN/1.73
GLOBULIN UR ELPH-MCNC: 2.4 GM/DL
GLUCOSE SERPL-MCNC: 105 MG/DL (ref 65–99)
HCT VFR BLD AUTO: 44.7 % (ref 37.5–51)
HDLC SERPL-MCNC: 45 MG/DL (ref 40–60)
HGB BLD-MCNC: 14.8 G/DL (ref 13–17.7)
IMM GRANULOCYTES # BLD AUTO: 0.02 10*3/MM3 (ref 0–0.05)
IMM GRANULOCYTES NFR BLD AUTO: 0.3 % (ref 0–0.5)
LDLC SERPL CALC-MCNC: 70 MG/DL (ref 0–100)
LDLC/HDLC SERPL: 1.58 {RATIO}
LYMPHOCYTES # BLD AUTO: 2.34 10*3/MM3 (ref 0.7–3.1)
LYMPHOCYTES NFR BLD AUTO: 30.6 % (ref 19.6–45.3)
MCH RBC QN AUTO: 32 PG (ref 26.6–33)
MCHC RBC AUTO-ENTMCNC: 33.1 G/DL (ref 31.5–35.7)
MCV RBC AUTO: 96.5 FL (ref 79–97)
MONOCYTES # BLD AUTO: 0.95 10*3/MM3 (ref 0.1–0.9)
MONOCYTES NFR BLD AUTO: 12.4 % (ref 5–12)
NEUTROPHILS NFR BLD AUTO: 4.07 10*3/MM3 (ref 1.7–7)
NEUTROPHILS NFR BLD AUTO: 53.3 % (ref 42.7–76)
NRBC BLD AUTO-RTO: 0 /100 WBC (ref 0–0.2)
PLATELET # BLD AUTO: 182 10*3/MM3 (ref 140–450)
PMV BLD AUTO: 10.4 FL (ref 6–12)
POTASSIUM SERPL-SCNC: 4.4 MMOL/L (ref 3.5–5.2)
PROT SERPL-MCNC: 6.4 G/DL (ref 6–8.5)
RBC # BLD AUTO: 4.63 10*6/MM3 (ref 4.14–5.8)
SODIUM SERPL-SCNC: 142 MMOL/L (ref 136–145)
T4 FREE SERPL-MCNC: 1.16 NG/DL (ref 0.93–1.7)
TRIGL SERPL-MCNC: 55 MG/DL (ref 0–150)
TSH SERPL DL<=0.05 MIU/L-ACNC: 2.78 UIU/ML (ref 0.27–4.2)
VLDLC SERPL-MCNC: 12 MG/DL (ref 5–40)
WBC # BLD AUTO: 7.64 10*3/MM3 (ref 3.4–10.8)

## 2021-06-29 PROCEDURE — 84439 ASSAY OF FREE THYROXINE: CPT

## 2021-06-29 PROCEDURE — 84443 ASSAY THYROID STIM HORMONE: CPT

## 2021-06-29 PROCEDURE — 80061 LIPID PANEL: CPT

## 2021-06-29 PROCEDURE — 80053 COMPREHEN METABOLIC PANEL: CPT

## 2021-06-29 PROCEDURE — 85025 COMPLETE CBC W/AUTO DIFF WBC: CPT

## 2021-06-29 PROCEDURE — 36415 COLL VENOUS BLD VENIPUNCTURE: CPT

## 2021-07-05 PROBLEM — I25.10 ATHEROSCLEROSIS OF CORONARY ARTERY: Status: ACTIVE | Noted: 2021-07-05

## 2021-07-05 PROBLEM — I10 ESSENTIAL HYPERTENSION: Status: ACTIVE | Noted: 2021-07-05

## 2021-07-05 PROBLEM — N18.30 CKD (CHRONIC KIDNEY DISEASE) STAGE 3, GFR 30-59 ML/MIN: Status: ACTIVE | Noted: 2021-07-05

## 2021-07-05 PROBLEM — E78.5 HYPERLIPIDEMIA LDL GOAL <100: Status: ACTIVE | Noted: 2021-07-05

## 2021-07-11 PROBLEM — Z86.718 HISTORY OF DVT (DEEP VEIN THROMBOSIS): Status: ACTIVE | Noted: 2021-07-11

## 2021-07-12 ENCOUNTER — OFFICE VISIT (OUTPATIENT)
Dept: CARDIOLOGY | Facility: CLINIC | Age: 86
End: 2021-07-12

## 2021-07-12 VITALS
SYSTOLIC BLOOD PRESSURE: 156 MMHG | HEART RATE: 49 BPM | HEIGHT: 71 IN | BODY MASS INDEX: 29.54 KG/M2 | WEIGHT: 211 LBS | DIASTOLIC BLOOD PRESSURE: 60 MMHG

## 2021-07-12 DIAGNOSIS — N18.32 STAGE 3B CHRONIC KIDNEY DISEASE (HCC): ICD-10-CM

## 2021-07-12 DIAGNOSIS — R60.0 PEDAL EDEMA: Primary | ICD-10-CM

## 2021-07-12 DIAGNOSIS — E78.5 HYPERLIPIDEMIA LDL GOAL <100: ICD-10-CM

## 2021-07-12 DIAGNOSIS — R00.1 BRADYCARDIA, SINUS: ICD-10-CM

## 2021-07-12 DIAGNOSIS — I10 ESSENTIAL HYPERTENSION: ICD-10-CM

## 2021-07-12 DIAGNOSIS — Z86.718 HISTORY OF DVT (DEEP VEIN THROMBOSIS): ICD-10-CM

## 2021-07-12 PROCEDURE — 93000 ELECTROCARDIOGRAM COMPLETE: CPT | Performed by: INTERNAL MEDICINE

## 2021-07-12 PROCEDURE — 99213 OFFICE O/P EST LOW 20 MIN: CPT | Performed by: NURSE PRACTITIONER

## 2021-07-12 RX ORDER — CETIRIZINE HYDROCHLORIDE 10 MG/1
10 TABLET ORAL DAILY
COMMUNITY
Start: 2021-04-30 | End: 2021-09-29 | Stop reason: SDUPTHER

## 2021-07-12 RX ORDER — POTASSIUM CHLORIDE 20 MEQ/1
20 TABLET, EXTENDED RELEASE ORAL DAILY
COMMUNITY
Start: 2021-06-21 | End: 2022-01-14

## 2021-07-12 RX ORDER — METOPROLOL SUCCINATE 100 MG/1
100 TABLET, EXTENDED RELEASE ORAL DAILY
Qty: 90 TABLET | Refills: 3 | Status: CANCELLED | OUTPATIENT
Start: 2021-07-12

## 2021-07-12 RX ORDER — TAMSULOSIN HYDROCHLORIDE 0.4 MG/1
CAPSULE ORAL
COMMUNITY
End: 2022-01-26

## 2021-07-12 RX ORDER — METOPROLOL SUCCINATE 50 MG/1
50 TABLET, EXTENDED RELEASE ORAL DAILY
Qty: 90 TABLET | Refills: 3 | Status: SHIPPED | OUTPATIENT
Start: 2021-07-12 | End: 2022-07-18 | Stop reason: SDUPTHER

## 2021-07-12 RX ORDER — TELMISARTAN 80 MG/1
80 TABLET ORAL DAILY
Qty: 90 TABLET | Refills: 3 | Status: SHIPPED | OUTPATIENT
Start: 2021-07-12 | End: 2022-06-20 | Stop reason: SDUPTHER

## 2021-07-12 RX ORDER — METOPROLOL SUCCINATE 100 MG/1
100 TABLET, EXTENDED RELEASE ORAL DAILY
COMMUNITY
End: 2021-07-12 | Stop reason: DRUGHIGH

## 2021-07-12 RX ORDER — FUROSEMIDE 20 MG/1
20 TABLET ORAL 2 TIMES DAILY
Qty: 180 TABLET | Refills: 3 | Status: SHIPPED | OUTPATIENT
Start: 2021-07-12 | End: 2022-02-25 | Stop reason: ALTCHOICE

## 2021-07-12 RX ORDER — MULTIPLE VITAMINS W/ MINERALS TAB 9MG-400MCG
TAB ORAL
COMMUNITY

## 2021-07-12 RX ORDER — FLUTICASONE PROPIONATE 50 MCG
SPRAY, SUSPENSION (ML) NASAL
COMMUNITY

## 2021-07-12 RX ORDER — APIXABAN 2.5 MG/1
2.5 TABLET, FILM COATED ORAL 2 TIMES DAILY
COMMUNITY
Start: 2021-07-09 | End: 2022-04-15 | Stop reason: SDUPTHER

## 2021-07-12 RX ORDER — FUROSEMIDE 20 MG/1
20 TABLET ORAL 2 TIMES DAILY
COMMUNITY
Start: 2021-03-22 | End: 2021-07-12 | Stop reason: SDUPTHER

## 2021-07-12 RX ORDER — TELMISARTAN 80 MG/1
80 TABLET ORAL DAILY
COMMUNITY
Start: 2021-03-22 | End: 2021-07-12 | Stop reason: SDUPTHER

## 2021-07-12 RX ORDER — MULTIPLE VITAMINS W/ MINERALS TAB 9MG-400MCG
TAB ORAL
COMMUNITY
End: 2022-01-14 | Stop reason: SDUPTHER

## 2021-07-12 RX ORDER — SPIRONOLACTONE 25 MG/1
25 TABLET ORAL DAILY
Qty: 90 TABLET | Refills: 3 | Status: SHIPPED | OUTPATIENT
Start: 2021-07-12 | End: 2022-02-25 | Stop reason: SDUPTHER

## 2021-07-12 RX ORDER — AMLODIPINE BESYLATE 5 MG/1
5 TABLET ORAL DAILY
COMMUNITY
Start: 2021-03-22 | End: 2022-04-15 | Stop reason: SDUPTHER

## 2021-07-12 RX ORDER — DOCUSATE SODIUM 100 MG/1
CAPSULE, LIQUID FILLED ORAL
COMMUNITY
Start: 2021-04-30 | End: 2021-09-29 | Stop reason: SDUPTHER

## 2021-07-12 RX ORDER — CETIRIZINE HYDROCHLORIDE 10 MG/1
CAPSULE, LIQUID FILLED ORAL
COMMUNITY
End: 2021-09-02 | Stop reason: SDUPTHER

## 2021-07-12 NOTE — PROGRESS NOTES
Chief Complaint  Follow-up (With EKG/ Echo), Hyperlipidemia, Chronic Kidney Disease, and Hypertension    Subjective            Vladimir Carter presents to [facility] CARDIOLOGY  89-year-old white male comes in complains of increasing pedal edema.  States gets worse throughout the day.  Follows a fairly low salt diet.Denies chest pain, shortness of breath, palpitations, dizziness, syncope, PND, and orthopnea.  Had both of his Covid vaccines.              Past History:    Past Medical History:   Diagnosis Date   • Abnormal bone density screening 09/29/2019    Spine-Osteopenia Hps-Osteoporosis   • Benign non-nodular prostatic hyperplasia with lower urinary tract symptoms 10/29/2018   • CAD (coronary artery disease)    • Chronic pain syndrome    • CKD (chronic kidney disease) stage 3, GFR 30-59 ml/min (CMS/[facility])    • GERD without esophagitis 09/16/2016   • H/O prostate cancer    • HTN (hypertension)    • Long term (current) use of anticoagulants    • Lupus anticoagulant disorder (CMS/[facility])    • Moderate exercise     Active but no formal exercise   • BENJA (obstructive sleep apnea)    • Osteoarthritis of left knee 04/22/2016    end-stage   • Osteoporosis     Osteoporosis in the hips/femur no fracture   • Overweight (BMI 25.0-29.9)    • Pedal edema 7/12/2021   • Pulmonary HTN (CMS/[facility])    • Severe tricuspid regurgitation 05/2018    ECHO (TTE) Diastolic dysfunction, Pulmonary HTN and Severe Tricuspid regurgitation   • Tinea unguium 01/24/2019        Family History: family history includes Breast cancer in his daughter; Cancer in his daughter; Diabetes in his daughter; Heart disease in his mother; Hypertension in his mother.     Social History: reports that he has quit smoking. He has never used smokeless tobacco. He reports that he does not drink alcohol and does not use drugs.    Allergies: Patient has no known allergies.      Past Surgical History:   Procedure Laterality Date   • CHOLECYSTECTOMY     •  COLONOSCOPY     • CYSTOSCOPY     • HERNIA REPAIR     • OTHER SURGICAL HISTORY      Brachytherapy   • REPLACEMENT TOTAL KNEE Left 05/10/2017   • REPLACEMENT TOTAL KNEE Right    • WRIST SURGERY          Prior to Admission medications    Medication Sig Start Date End Date Taking? Authorizing Provider   ALBUTEROL IN    Yes Marie Rankin MD   amLODIPine (NORVASC) 5 MG tablet Take 5 mg by mouth Daily. 3/22/21  Yes Marie Rankin MD   CBD (cannabidiol) oral oil Take  by mouth.   Yes Marie Rankin MD   cetirizine (zyrTEC) 10 MG tablet Take 10 mg by mouth Daily. 4/30/21  Yes Marie Rankin MD   Cetirizine HCl (ZyrTEC Allergy) 10 MG capsule    Yes Marie Rankin MD   Cholecalciferol 50 MCG (2000 UT) capsule Vitamin D3 2,000 unit oral capsule take 1 capsule by oral route daily   Suspended   Yes Marie Rankin MD   DHA-EPA-FLAXSEED OIL-VITAMIN E PO Take  by mouth.   Yes Marie Rankin MD   docusate sodium (COLACE) 100 MG capsule  4/30/21  Yes Marie Rankin MD   Eliquis 2.5 MG tablet tablet Take 2.5 mg by mouth 2 (two) times a day. 7/9/21  Yes Marie Rankin MD   fluticasone (Flonase) 50 MCG/ACT nasal spray into the nostril(s) as directed by provider.   Yes Marie Rankin MD   furosemide (Lasix) 20 MG tablet Take 20 mg by mouth 2 (two) times a day. 3/22/21  Yes Marie Rankin MD   Magnesium 100 MG capsule magnesium 250 mg oral tablet take 1 tablet by oral route daily   Suspended   Yes Marie Rankin MD   metoprolol succinate XL (TOPROL-XL) 100 MG 24 hr tablet Take 100 mg by mouth Daily.   Yes Marie Rankin MD   Mirabegron ER (Myrbetriq) 25 MG tablet sustained-release 24 hour 24 hr tablet Myrbetriq 25 mg oral tablet extended release 24 hr take 1 tablet (25 mg) by oral route once daily swallowing whole with water. Do not crush, chew and/or divide. for 90 days 3/2/2021  Active 3/2/21  Yes Marie Rankin MD   multivitamin with  "minerals (MULTIVITAMIN ADULTS PO) multivitamin oral tablet take 1 tablet by oral route daily   Suspended   Yes Provider, MD Marie   multivitamin with minerals (PRESERVISION AREDS PO) PreserVision AREDS 7,160-113-100 unit-mg-unit oral tablet take 1 tablet by oral route daily   Active   Yes Provider, MD Marie   potassium chloride (K-DUR,KLOR-CON) 20 MEQ CR tablet Take 20 mEq by mouth Daily. 21  Yes Provider, MD Marie   tamsulosin (FLOMAX) 0.4 MG capsule 24 hr capsule tamsulosin 0.4 mg capsule   Yes Provider, Historical, MD   telmisartan (MICARDIS) 80 MG tablet Take 80 mg by mouth Daily. 3/22/21  Yes Provider, MD Marie        Review of Systems   Respiratory: Negative for shortness of breath.    Cardiovascular: Positive for leg swelling. Negative for chest pain and palpitations.   All other systems reviewed and are negative.       Objective     Physical Exam  Vitals reviewed.   Constitutional:       Appearance: Normal appearance. He is normal weight.      Comments: Lalo by his daughter and ambulates with a cane   Eyes:      Pupils: Pupils are equal, round, and reactive to light.   Neck:      Vascular: No carotid bruit.   Cardiovascular:      Rate and Rhythm: Regular rhythm. Bradycardia present.      Heart sounds: S1 normal and S2 normal. No murmur heard.   No S3 or S4 sounds.    Pulmonary:      Effort: Pulmonary effort is normal.      Breath sounds: Normal breath sounds.   Musculoskeletal:      Right lower le+ Pitting Edema present.      Left lower le+ Pitting Edema present.   Neurological:      Mental Status: He is alert.       /60   Pulse (!) 49   Ht 180.3 cm (71\")   Wt 95.7 kg (211 lb)   BMI 29.43 kg/m²       Vitals:    21 0825   BP: 156/60   Pulse: (!) 49       Result Review :         The following data was reviewed by: FIORELLA Iyer on 2021:      No results found for: PROBNP, BNP  CMP    CMP 21    0718 0718     Glucose  "   105 (A)   Glucose   102 (A)    BUN   16 19   Creatinine 1.08  1.16 1.16   eGFR Non African Am    59 (A)   Sodium   144 142   Potassium   4.3 4.4   Chloride   106 105   Calcium  9.3 9.0 8.5   Albumin    4.00   Total Bilirubin    0.3   Alkaline Phosphatase    62   AST (SGOT)    19   ALT (SGPT)    6   (A) Abnormal value            CBC w/diff    CBC w/Diff 1/2/21 6/29/21   WBC 8.00 7.64   RBC 4.69 (A) 4.63   Hemoglobin 14.6 14.8   Hematocrit 45.6 44.7   MCV 97.2 (A) 96.5   MCH 31.1 (A) 32.0   MCHC 32.0 (A) 33.1   RDW 14.0 13.3   Platelets 181 182   Neutrophil Rel % 48.7 53.3   Immature Granulocyte Rel %  0.3   Lymphocyte Rel % 33.9 30.6   Monocyte Rel % 12.6 (A) 12.4 (A)   Eosinophil Rel % 3.5 2.7   Basophil Rel % 1.0 0.7   (A) Abnormal value             Lipid Panel    Lipid Panel 1/2/21 6/29/21   Total Cholesterol  127   Total Cholesterol 129    Triglycerides 53 55   HDL Cholesterol 50 45   VLDL Cholesterol 11 12   LDL Cholesterol  68 (A) 70   LDL/HDL Ratio  1.58   (A) Abnormal value       Comments are available for some flowsheets but are not being displayed.            Lab Results   Component Value Date    TSH 2.780 06/29/2021      Lab Results   Component Value Date    FREET4 1.16 06/29/2021      No results found for: DDIMERQUANT  No results found for: MG   No results found for: DIGOXIN                   ECG 12 Lead    Date/Time: 7/12/2021 7:30 PM  Performed by: Paxton Luis MD  Authorized by: Paxton Luis MD   Comparison: compared with previous ECG from 2/28/2018  Comments: Sinus bradycardia, prolonged DE interval, right bundle branch block.  The heart rate is slower than previous EKG                Assessment and Plan        Diagnoses and all orders for this visit:    1. Pedal edema (Primary)  Assessment & Plan:  Worsening.  Add spironolactone 25 mg once a day.  Stop potassium.  We will do a blood pressure log and when returns it he will tell us if the swelling is better or not      2. Hyperlipidemia LDL  goal <100  Assessment & Plan:  At goal without a statin.  Continue lifestyle modifications.    Orders:  -     Lipid Panel; Future    3. Bradycardia, sinus  Assessment & Plan:  Decrease metoprolol to 50 mg at night only.      4. Essential hypertension  Assessment & Plan:  Needs tighter control.  Start spironolactone 25 once a day.  Stop potassium.  Do blood pressure log and we will adjust adjust meds further if needed    Orders:  -     telmisartan (MICARDIS) 80 MG tablet; Take 1 tablet by mouth Daily.  Dispense: 90 tablet; Refill: 3  -     furosemide (Lasix) 20 MG tablet; Take 1 tablet by mouth 2 (two) times a day.  Dispense: 180 tablet; Refill: 3  -     Basic Metabolic Panel; Future  -     Comprehensive Metabolic Panel; Future  -     spironolactone (ALDACTONE) 25 MG tablet; Take 1 tablet by mouth Daily.  Dispense: 90 tablet; Refill: 3  -     metoprolol succinate XL (TOPROL-XL) 50 MG 24 hr tablet; Take 1 tablet by mouth Daily.  Dispense: 90 tablet; Refill: 3    5. Stage 3b chronic kidney disease (CMS/HCC)  Assessment & Plan:  Stable.      6. History of DVT (deep vein thrombosis)  Assessment & Plan:  Stable continue Eliquis.    Orders:  -     CBC & Differential; Future    Other orders  -     Cancel: metoprolol succinate XL (TOPROL-XL) 100 MG 24 hr tablet; Take 1 tablet by mouth Daily.  Dispense: 90 tablet; Refill: 3            Follow Up     Return in about 6 months (around 1/12/2022) for with Dr. Luis, EKG on next visit.    Patient was given instructions and counseling regarding his condition or for health maintenance advice. Please see specific information pulled into the AVS if appropriate.       FIORELLA Garza  07/12/21 08:37 EDT

## 2021-07-12 NOTE — ASSESSMENT & PLAN NOTE
Needs tighter control.  Start spironolactone 25 once a day.  Stop potassium.  Do blood pressure log and we will adjust adjust meds further if needed

## 2021-07-12 NOTE — PATIENT INSTRUCTIONS
Decrease metoprolol 100 mg to half a tab until the bottle is done and then I will send in a new prescription to [place].    Stop potassium.  Start spironolactone 25 mg once a day.  In the morning.    In about 3 or 4 days start a blood pressure log for 2 weeks.  When you return and let me know how the swelling is doing.  Do labs in about 3 weeks.

## 2021-07-15 VITALS
WEIGHT: 213.37 LBS | SYSTOLIC BLOOD PRESSURE: 151 MMHG | TEMPERATURE: 97.4 F | DIASTOLIC BLOOD PRESSURE: 53 MMHG | OXYGEN SATURATION: 95 % | HEIGHT: 71 IN | HEART RATE: 60 BPM | BODY MASS INDEX: 29.87 KG/M2

## 2021-07-15 VITALS
OXYGEN SATURATION: 96 % | SYSTOLIC BLOOD PRESSURE: 140 MMHG | HEART RATE: 55 BPM | DIASTOLIC BLOOD PRESSURE: 53 MMHG | BODY MASS INDEX: 29.68 KG/M2 | WEIGHT: 212 LBS | HEIGHT: 71 IN | TEMPERATURE: 96.7 F

## 2021-07-20 ENCOUNTER — TELEPHONE (OUTPATIENT)
Dept: CARDIOLOGY | Facility: CLINIC | Age: 86
End: 2021-07-20

## 2021-07-20 NOTE — TELEPHONE ENCOUNTER
Called and spoke with daughter, Tanisha.  Informed her of Echo results.  Per Dr. Luis- similar to previous echo in 2018.    Daughter reports edema is gradually improving.

## 2021-07-27 ENCOUNTER — TRANSCRIBE ORDERS (OUTPATIENT)
Dept: LAB | Facility: HOSPITAL | Age: 86
End: 2021-07-27

## 2021-07-27 ENCOUNTER — LAB (OUTPATIENT)
Dept: LAB | Facility: HOSPITAL | Age: 86
End: 2021-07-27

## 2021-07-27 DIAGNOSIS — Z79.899 ENCOUNTER FOR LONG-TERM (CURRENT) USE OF OTHER MEDICATIONS: ICD-10-CM

## 2021-07-27 DIAGNOSIS — M81.0 AGE-RELATED OSTEOPOROSIS WITHOUT CURRENT PATHOLOGICAL FRACTURE: ICD-10-CM

## 2021-07-27 DIAGNOSIS — Z79.899 ENCOUNTER FOR LONG-TERM (CURRENT) USE OF OTHER MEDICATIONS: Primary | ICD-10-CM

## 2021-07-27 LAB
25(OH)D3 SERPL-MCNC: 59.8 NG/ML (ref 30–100)
CALCIUM SPEC-SCNC: 9.3 MG/DL (ref 8.2–9.6)
CREAT SERPL-MCNC: 1.31 MG/DL (ref 0.76–1.27)
GFR SERPL CREATININE-BSD FRML MDRD: 51 ML/MIN/1.73

## 2021-07-27 PROCEDURE — 82306 VITAMIN D 25 HYDROXY: CPT

## 2021-07-27 PROCEDURE — 82310 ASSAY OF CALCIUM: CPT

## 2021-07-27 PROCEDURE — 36415 COLL VENOUS BLD VENIPUNCTURE: CPT

## 2021-07-27 PROCEDURE — 82565 ASSAY OF CREATININE: CPT

## 2021-07-30 ENCOUNTER — LAB (OUTPATIENT)
Dept: LAB | Facility: HOSPITAL | Age: 86
End: 2021-07-30

## 2021-07-30 DIAGNOSIS — I10 ESSENTIAL HYPERTENSION: ICD-10-CM

## 2021-07-30 LAB
ANION GAP SERPL CALCULATED.3IONS-SCNC: 8.3 MMOL/L (ref 5–15)
BUN SERPL-MCNC: 20 MG/DL (ref 8–23)
BUN/CREAT SERPL: 14.9 (ref 7–25)
CALCIUM SPEC-SCNC: 9 MG/DL (ref 8.2–9.6)
CHLORIDE SERPL-SCNC: 104 MMOL/L (ref 98–107)
CO2 SERPL-SCNC: 27.7 MMOL/L (ref 22–29)
CREAT SERPL-MCNC: 1.34 MG/DL (ref 0.76–1.27)
GFR SERPL CREATININE-BSD FRML MDRD: 50 ML/MIN/1.73
GLUCOSE SERPL-MCNC: 106 MG/DL (ref 65–99)
POTASSIUM SERPL-SCNC: 4.5 MMOL/L (ref 3.5–5.2)
SODIUM SERPL-SCNC: 140 MMOL/L (ref 136–145)

## 2021-07-30 PROCEDURE — 80048 BASIC METABOLIC PNL TOTAL CA: CPT

## 2021-07-30 PROCEDURE — 36415 COLL VENOUS BLD VENIPUNCTURE: CPT

## 2021-08-01 NOTE — PROGRESS NOTES
Creatinine slightly high.  Decrease spironolactone to 25 mg half a tab a day.  Repeat a BMP in 1 month

## 2021-08-02 ENCOUNTER — TELEPHONE (OUTPATIENT)
Dept: CARDIOLOGY | Facility: CLINIC | Age: 86
End: 2021-08-02

## 2021-08-02 NOTE — TELEPHONE ENCOUNTER
----- Message from FIORELLA Iyer sent at 8/1/2021  6:40 PM EDT -----  Creatinine slightly high.  Decrease spironolactone to 25 mg half a tab a day.  Repeat a BMP in 1 month

## 2021-08-10 ENCOUNTER — TRANSCRIBE ORDERS (OUTPATIENT)
Dept: ADMINISTRATIVE | Facility: HOSPITAL | Age: 86
End: 2021-08-10

## 2021-08-10 DIAGNOSIS — M81.0 SENILE OSTEOPOROSIS: Primary | ICD-10-CM

## 2021-08-17 ENCOUNTER — HOSPITAL ENCOUNTER (OUTPATIENT)
Dept: INFUSION THERAPY | Facility: HOSPITAL | Age: 86
Setting detail: INFUSION SERIES
Discharge: HOME | End: 2021-08-17

## 2021-08-17 VITALS
WEIGHT: 208.34 LBS | HEART RATE: 71 BPM | DIASTOLIC BLOOD PRESSURE: 55 MMHG | OXYGEN SATURATION: 94 % | SYSTOLIC BLOOD PRESSURE: 138 MMHG | BODY MASS INDEX: 28.22 KG/M2 | RESPIRATION RATE: 20 BRPM | TEMPERATURE: 98 F | HEIGHT: 72 IN

## 2021-08-17 DIAGNOSIS — M81.0 OSTEOPOROSIS, UNSPECIFIED OSTEOPOROSIS TYPE, UNSPECIFIED PATHOLOGICAL FRACTURE PRESENCE: Primary | ICD-10-CM

## 2021-08-17 PROCEDURE — 96372 THER/PROPH/DIAG INJ SC/IM: CPT

## 2021-08-17 PROCEDURE — 25010000002 DENOSUMAB 60 MG/ML SOLUTION PREFILLED SYRINGE: Performed by: INTERNAL MEDICINE

## 2021-08-17 RX ADMIN — DENOSUMAB 60 MG: 60 INJECTION SUBCUTANEOUS at 10:59

## 2021-08-18 ENCOUNTER — OFFICE VISIT (OUTPATIENT)
Dept: PODIATRY | Facility: CLINIC | Age: 86
End: 2021-08-18

## 2021-08-18 VITALS
DIASTOLIC BLOOD PRESSURE: 54 MMHG | BODY MASS INDEX: 41.62 KG/M2 | OXYGEN SATURATION: 97 % | TEMPERATURE: 97.5 F | SYSTOLIC BLOOD PRESSURE: 164 MMHG | HEART RATE: 67 BPM | WEIGHT: 212 LBS | HEIGHT: 60 IN

## 2021-08-18 DIAGNOSIS — B35.1 ONYCHOMYCOSIS: ICD-10-CM

## 2021-08-18 DIAGNOSIS — M79.671 FOOT PAIN, BILATERAL: Primary | ICD-10-CM

## 2021-08-18 DIAGNOSIS — M79.672 FOOT PAIN, BILATERAL: Primary | ICD-10-CM

## 2021-08-18 DIAGNOSIS — L60.0 ONYCHOCRYPTOSIS: ICD-10-CM

## 2021-08-18 DIAGNOSIS — R26.2 DIFFICULTY WALKING: ICD-10-CM

## 2021-08-18 PROCEDURE — 11721 DEBRIDE NAIL 6 OR MORE: CPT | Performed by: PODIATRIST

## 2021-08-18 NOTE — PROGRESS NOTES
[facility] - PODIATRY    Today's Date: 08/18/21    Patient Name: Vladimir Carter  MRN: [id number]  CSN: [id number]  PCP: Mango Kumar DO  Referring Provider: No ref. provider found    SUBJECTIVE     Chief Complaint   Patient presents with   • Left Foot - Nail Problem   • Right Foot - Nail Problem     HPI: Vladimir Carter, a 89 y.o.male, comes to clinic.    New, Established, New Problem:  established   Location:  Toenails  Duration:   Greater than five years  Onset:  Gradual  Nature:  sore with palpation.  Stable, worsening, improving:   Stable  Aggravating factors:  Pain with shoe gear and ambulation.  Previous Treatment:  debridement    Patient reports the following medical changes since their last visit: None.    No other pedal complaints at this time.    Patient denies any fevers, chills, nausea, vomiting, shortness of breathe, nor any other constitutional signs nor symptoms.       Last PCP Visit:  Mango Kumar DO, 24 May 2001    Past Medical History:   Diagnosis Date   • Abnormal bone density screening 09/29/2019    Spine-Osteopenia Hps-Osteoporosis   • Benign non-nodular prostatic hyperplasia with lower urinary tract symptoms 10/29/2018   • CAD (coronary artery disease)    • Chronic pain syndrome    • CKD (chronic kidney disease) stage 3, GFR 30-59 ml/min (CMS/[facility])    • GERD without esophagitis 09/16/2016   • H/O prostate cancer    • HTN (hypertension)    • Long term (current) use of anticoagulants    • Lupus anticoagulant disorder (CMS/[facility])    • Moderate exercise     Active but no formal exercise   • BENJA (obstructive sleep apnea)    • Osteoarthritis of left knee 04/22/2016    end-stage   • Osteoporosis     Osteoporosis in the hips/femur no fracture   • Overweight (BMI 25.0-29.9)    • Pedal edema 7/12/2021   • Pulmonary HTN (CMS/[facility])    • Severe tricuspid regurgitation 05/2018    ECHO (TTE) Diastolic dysfunction, Pulmonary HTN and Severe Tricuspid regurgitation   • Tinea unguium 01/24/2019      Past Surgical History:   Procedure Laterality Date   • CHOLECYSTECTOMY     • COLONOSCOPY     • CYSTOSCOPY     • HERNIA REPAIR     • OTHER SURGICAL HISTORY      Brachytherapy   • REPLACEMENT TOTAL KNEE Left 05/10/2017   • REPLACEMENT TOTAL KNEE Right    • WRIST SURGERY       Family History   Problem Relation Age of Onset   • Heart disease Mother    • Hypertension Mother    • Breast cancer Daughter         Breast Neoplasm, Malignant   • Cancer Daughter         Unspecified   • Diabetes Daughter         Unspecified type     Social History     Socioeconomic History   • Marital status:      Spouse name: Not on file   • Number of children: Not on file   • Years of education: Not on file   • Highest education level: Not on file   Tobacco Use   • Smoking status: Former Smoker   • Smokeless tobacco: Never Used   Vaping Use   • Vaping Use: Never used   Substance and Sexual Activity   • Alcohol use: Never   • Drug use: Never   • Sexual activity: Defer     No Known Allergies  Current Outpatient Medications   Medication Sig Dispense Refill   • ALBUTEROL IN      • amLODIPine (NORVASC) 5 MG tablet Take 5 mg by mouth Daily.     • CBD (cannabidiol) oral oil Take  by mouth.     • cetirizine (zyrTEC) 10 MG tablet Take 10 mg by mouth Daily.     • Cetirizine HCl (ZyrTEC Allergy) 10 MG capsule      • Cholecalciferol 50 MCG (2000 UT) capsule Vitamin D3 2,000 unit oral capsule take 1 capsule by oral route daily   Suspended     • DHA-EPA-FLAXSEED OIL-VITAMIN E PO Take  by mouth.     • docusate sodium (COLACE) 100 MG capsule      • Eliquis 2.5 MG tablet tablet Take 2.5 mg by mouth 2 (two) times a day.     • fluticasone (Flonase) 50 MCG/ACT nasal spray into the nostril(s) as directed by provider.     • furosemide (Lasix) 20 MG tablet Take 1 tablet by mouth 2 (two) times a day. 180 tablet 3   • Magnesium 100 MG capsule magnesium 250 mg oral tablet take 1 tablet by oral route daily   Suspended     • metoprolol succinate XL (TOPROL-XL)  50 MG 24 hr tablet Take 1 tablet by mouth Daily. 90 tablet 3   • Mirabegron ER (Myrbetriq) 25 MG tablet sustained-release 24 hour 24 hr tablet Myrbetriq 25 mg oral tablet extended release 24 hr take 1 tablet (25 mg) by oral route once daily swallowing whole with water. Do not crush, chew and/or divide. for 90 days 3/2/2021  Active     • multivitamin with minerals (MULTIVITAMIN ADULTS PO) multivitamin oral tablet take 1 tablet by oral route daily   Suspended     • multivitamin with minerals (PRESERVISION AREDS PO) PreserVision AREDS 7,160-113-100 unit-mg-unit oral tablet take 1 tablet by oral route daily   Active     • potassium chloride (K-DUR,KLOR-CON) 20 MEQ CR tablet Take 20 mEq by mouth Daily.     • spironolactone (ALDACTONE) 25 MG tablet Take 1 tablet by mouth Daily. 90 tablet 3   • tamsulosin (FLOMAX) 0.4 MG capsule 24 hr capsule tamsulosin 0.4 mg capsule     • telmisartan (MICARDIS) 80 MG tablet Take 1 tablet by mouth Daily. 90 tablet 3     No current facility-administered medications for this visit.     Review of Systems   Constitutional: Negative.    Skin:        Painful toenails.   All other systems reviewed and are negative.      OBJECTIVE     Vitals:    21 0950   BP: 164/54   Pulse: 67   Temp: 97.5 °F (36.4 °C)   SpO2: 97%       Patient seen in no apparent distress.      PHYSICAL EXAM:     Foot/Ankle Exam:       General:   Appearance: elderly    Orientation: AAOx3    Affect: appropriate    Gait: antalgic    Assistance: cane    Shoe Gear:  Casual shoes    VASCULAR      Right Foot Vascularity   Normal vascular exam    Dorsalis pedis:  1+  Posterior tibial:  1+  Skin Temperature: cool    Edema Gradin+ and pitting  CFT:  < 3 seconds  Pedal Hair Growth:  Present  Varicosities: mild varicosities       Left Foot Vascularity   Normal vascular exam    Dorsalis pedis:  2+  Posterior tibial:  1+  Skin Temperature: cool    Edema Gradin+ and pitting  CFT:  < 3 seconds  Pedal Hair Growth:   Present  Varicosities: mild varicosities        NEUROLOGIC     Right Foot Neurologic   Normal sensation    Light touch sensation:  Normal  Vibratory sensation:  Normal  Hot/Cold sensation: normal    Protective Sensation using [place] Monofilament:  10     Left Foot Neurologic   Normal sensation    Light touch sensation:  Normal  Vibratory sensation:  Normal  Hot/cold sensation: normal    Protective Sensation using [place] Monofilament:  10     MUSCLE STRENGTH     Right Foot Muscle Strength   Normal strength    Foot dorsiflexion:  5  Foot plantar flexion:  5  Foot inversion:  5  Foot eversion:  5     Left Foot Muscle Strength   Normal strength    Foot dorsiflexion:  5  Foot plantar flexion:  5  Foot inversion:  5  Foot eversion:  5     RANGE OF MOTION      Right Foot Range of Motion   Foot and ankle ROM within normal limits       Left Foot Range of Motion   Foot and ankle ROM within normal limits       DERMATOLOGIC     Right Foot Dermatologic   Skin: skin intact    Nails: onychomycosis, abnormally thick, subungual debris, dystrophic nails and ingrown toenail    Nails comment:  Toenails 1, 2, 3, 4, and 5     Left Foot Dermatologic   Skin: skin intact    Nails: onychomycosis, abnormally thick, subungual debris, dystrophic nails and ingrown toenail    Nails comment:  Toenails 1, 2, 3, 4, and 5      ASSESSMENT/PLAN     Diagnoses and all orders for this visit:    1. Foot pain, bilateral (Primary)    2. Onychocryptosis    3. Onychomycosis    4. Difficulty walking        Comprehensive lower extremity examination and evaluation was performed.    Discussed findings and treatment plan including risks, benefits, and treatment options with patient in detail. Patient agreed with treatment plan.    Toenails 1 through 5 bilaterally were debrided in thickness and length and then smoothed with a Dremel Tool.  Tolerated the procedure well without complications.    An After Visit Summary was printed and given to the  patient at discharge, including (if requested) any available informative/educational handouts regarding diagnosis, treatment, or medications. All questions were answered to patient/family satisfaction. Should symptoms fail to improve or worsen they agree to call or return to clinic or to go to the Emergency Department. Discussed the importance of following up with any needed screening tests/labs/specialist appointments and any requested follow-up recommended by me today. Importance of maintaining follow-up discussed and patient accepts that missed appointments can delay diagnosis and potentially lead to worsening of conditions.    Return in about 9 weeks (around 10/20/2021) for Toenail Care., or sooner if acute issues arise.    This document has been electronically signed by Enzo Ferrell DPM on August 18, 2021 10:37 EDT

## 2021-08-30 ENCOUNTER — TELEPHONE (OUTPATIENT)
Dept: CARDIOLOGY | Facility: CLINIC | Age: 86
End: 2021-08-30

## 2021-09-02 RX ORDER — CYCLOSPORINE 0.5 MG/ML
EMULSION OPHTHALMIC DAILY
COMMUNITY
Start: 2021-08-06

## 2021-09-02 RX ORDER — PANTOPRAZOLE SODIUM 40 MG/1
TABLET, DELAYED RELEASE ORAL
COMMUNITY
Start: 2021-06-21 | End: 2022-04-15 | Stop reason: SDUPTHER

## 2021-09-03 ENCOUNTER — OFFICE VISIT (OUTPATIENT)
Dept: FAMILY MEDICINE CLINIC | Facility: CLINIC | Age: 86
End: 2021-09-03

## 2021-09-03 VITALS
BODY MASS INDEX: 29.41 KG/M2 | OXYGEN SATURATION: 95 % | HEIGHT: 71 IN | SYSTOLIC BLOOD PRESSURE: 149 MMHG | WEIGHT: 210.1 LBS | TEMPERATURE: 97.2 F | DIASTOLIC BLOOD PRESSURE: 58 MMHG | HEART RATE: 65 BPM

## 2021-09-03 DIAGNOSIS — E66.3 OVERWEIGHT (BMI 25.0-29.9): ICD-10-CM

## 2021-09-03 DIAGNOSIS — G25.0 ESSENTIAL TREMOR: ICD-10-CM

## 2021-09-03 DIAGNOSIS — K21.9 GASTROESOPHAGEAL REFLUX DISEASE WITHOUT ESOPHAGITIS: Chronic | ICD-10-CM

## 2021-09-03 DIAGNOSIS — M47.816 LUMBAR SPONDYLOSIS: ICD-10-CM

## 2021-09-03 DIAGNOSIS — N40.1 BENIGN NON-NODULAR PROSTATIC HYPERPLASIA WITH LOWER URINARY TRACT SYMPTOMS: Chronic | ICD-10-CM

## 2021-09-03 DIAGNOSIS — I10 ESSENTIAL HYPERTENSION: Chronic | ICD-10-CM

## 2021-09-03 DIAGNOSIS — N18.32 STAGE 3B CHRONIC KIDNEY DISEASE (HCC): Primary | Chronic | ICD-10-CM

## 2021-09-03 PROBLEM — M48.061 SPINAL STENOSIS OF LUMBAR REGION: Status: ACTIVE | Noted: 2018-10-08

## 2021-09-03 PROBLEM — I07.1 TRICUSPID VALVE INSUFFICIENCY: Status: ACTIVE | Noted: 2018-05-01

## 2021-09-03 PROBLEM — G47.33 OSA (OBSTRUCTIVE SLEEP APNEA): Status: ACTIVE | Noted: 2021-09-03

## 2021-09-03 PROBLEM — G89.4 CHRONIC PAIN DISORDER: Status: ACTIVE | Noted: 2021-09-03

## 2021-09-03 PROBLEM — Z85.46 H/O PROSTATE CANCER: Status: ACTIVE | Noted: 2021-09-03

## 2021-09-03 PROCEDURE — 99214 OFFICE O/P EST MOD 30 MIN: CPT | Performed by: FAMILY MEDICINE

## 2021-09-03 NOTE — PROGRESS NOTES
"Chief Complaint  Follow-up, Shaking, and Allergies    Subjective          Vladimir Carter presents to [facility]  History of Present Illness     Patient presents with his daughter complaining of slight tremor in his hands when he is doing something, but not all the time does not affect his ability to eat or write or other.  No chest pain palpitations headache fever or other his blood pressures fairly well controlled he has history of BPH she follows with urology last labs were 8/2021 had moderate elevation glucose 106 creatinine 1.34 and GFR 54 kdiney disease 3.     Objective   Vital Signs:   /58 (BP Location: Left arm, Patient Position: Sitting)   Pulse 65   Temp 97.2 °F (36.2 °C) (Infrared)   Ht 180.3 cm (71\")   Wt 95.3 kg (210 lb 1.6 oz)   SpO2 95%   BMI 29.30 kg/m²     Physical Exam  Vitals reviewed.   Constitutional:       Appearance: Normal appearance. He is well-developed.   HENT:      Head: Normocephalic and atraumatic.      Right Ear: External ear normal.      Left Ear: External ear normal.      Mouth/Throat:      Pharynx: No oropharyngeal exudate.   Eyes:      Conjunctiva/sclera: Conjunctivae normal.      Pupils: Pupils are equal, round, and reactive to light.   Cardiovascular:      Rate and Rhythm: Normal rate and regular rhythm.      Heart sounds: No murmur heard.   No friction rub. No gallop.    Pulmonary:      Effort: Pulmonary effort is normal.      Breath sounds: Normal breath sounds. No wheezing or rhonchi.   Abdominal:      General: Bowel sounds are normal. There is no distension.      Palpations: Abdomen is soft.      Tenderness: There is no abdominal tenderness.   Skin:     General: Skin is warm and dry.   Neurological:      Mental Status: He is alert and oriented to person, place, and time.      Cranial Nerves: No cranial nerve deficit.   Psychiatric:         Mood and Affect: Mood and affect normal.         Behavior: Behavior normal.         Thought " Content: Thought content normal.         Judgment: Judgment normal.        Result Review :   The following data was reviewed by: Mango Kumar DO on 09/03/2021:  Common labs    Common Labsle 6/29/21 6/29/21 6/29/21 7/27/21 7/27/21 7/30/21    0742 0742 0742 [id number]    Glucose  105 (A)    106 (A)   BUN  19    20   Creatinine  1.16   1.31 (A) 1.34 (A)   eGFR Non  Am  59 (A)   51 (A) 50 (A)   Sodium  142    140   Potassium  4.4    4.5   Chloride  105    104   Calcium  8.5  9.3  9.0   Albumin  4.00       Total Bilirubin  0.3       Alkaline Phosphatase  62       AST (SGOT)  19       ALT (SGPT)  6       WBC 7.64        Hemoglobin 14.8        Hematocrit 44.7        Platelets 182        Total Cholesterol   127      Triglycerides   55      HDL Cholesterol   45      LDL Cholesterol    70      (A) Abnormal value                      Assessment and Plan    Diagnoses and all orders for this visit:    1. Stage 3b chronic kidney disease (CMS/HCC) (Primary)    2. Essential hypertension    3. Benign non-nodular prostatic hyperplasia with lower urinary tract symptoms    4. Overweight (BMI 25.0-29.9)    5. Lumbar spondylosis    6. Gastroesophageal reflux disease without esophagitis    7. Essential tremor    Reviewed medications no findings that could be consistent with his complaint of tremors likely a benign essential tremor continue with medicines otherwise as prescribed for HTN monitoring weight at home with history of overweight BMI 25-30 spondylosis with mild use of naproxen or NSAIDs for breakthrough pain    Reviewed medicines continue with metoprolol telmisartan spironolactone Lasix monitor fluid intake continue with Eliquis for long-term anticoagulation        Follow Up   Return if symptoms worsen or fail to improve, for Next scheduled follow up.  Patient was given instructions and counseling regarding his condition or for health maintenance advice. Please see specific information pulled into the AVS if appropriate.

## 2021-09-09 PROBLEM — G25.0 ESSENTIAL TREMOR: Status: ACTIVE | Noted: 2021-09-09

## 2021-09-29 ENCOUNTER — TELEPHONE (OUTPATIENT)
Dept: FAMILY MEDICINE CLINIC | Facility: CLINIC | Age: 86
End: 2021-09-29

## 2021-09-29 RX ORDER — CETIRIZINE HYDROCHLORIDE 10 MG/1
10 TABLET ORAL DAILY
Qty: 90 TABLET | Refills: 1 | Status: SHIPPED | OUTPATIENT
Start: 2021-09-29 | End: 2021-10-03 | Stop reason: SDUPTHER

## 2021-09-29 RX ORDER — DOCUSATE SODIUM 100 MG/1
100 CAPSULE, LIQUID FILLED ORAL 2 TIMES DAILY
Qty: 180 CAPSULE | Refills: 1 | Status: SHIPPED | OUTPATIENT
Start: 2021-09-29 | End: 2021-10-03 | Stop reason: SDUPTHER

## 2021-09-29 NOTE — TELEPHONE ENCOUNTER
Patient called requesting refills on cetrizine and docusate sodium. Sent to [facility] pharmacy

## 2021-10-03 DIAGNOSIS — K59.01 SLOW TRANSIT CONSTIPATION: Primary | ICD-10-CM

## 2021-10-03 DIAGNOSIS — J30.1 SEASONAL ALLERGIC RHINITIS DUE TO POLLEN: ICD-10-CM

## 2021-10-03 RX ORDER — CETIRIZINE HYDROCHLORIDE 10 MG/1
10 TABLET ORAL DAILY
Qty: 90 TABLET | Refills: 3 | Status: SHIPPED | OUTPATIENT
Start: 2021-10-03 | End: 2022-04-19 | Stop reason: SDUPTHER

## 2021-10-03 RX ORDER — DOCUSATE SODIUM 100 MG/1
100 CAPSULE, LIQUID FILLED ORAL 2 TIMES DAILY
Qty: 180 CAPSULE | Refills: 3 | Status: SHIPPED | OUTPATIENT
Start: 2021-10-03 | End: 2022-04-19 | Stop reason: SDUPTHER

## 2021-10-25 ENCOUNTER — OFFICE VISIT (OUTPATIENT)
Dept: PODIATRY | Facility: CLINIC | Age: 86
End: 2021-10-25

## 2021-10-25 VITALS
OXYGEN SATURATION: 98 % | WEIGHT: 210 LBS | BODY MASS INDEX: 29.4 KG/M2 | TEMPERATURE: 96.9 F | HEIGHT: 71 IN | HEART RATE: 75 BPM

## 2021-10-25 DIAGNOSIS — B35.1 ONYCHOMYCOSIS: ICD-10-CM

## 2021-10-25 DIAGNOSIS — R26.2 DIFFICULTY WALKING: ICD-10-CM

## 2021-10-25 DIAGNOSIS — L60.0 ONYCHOCRYPTOSIS: ICD-10-CM

## 2021-10-25 DIAGNOSIS — M79.671 FOOT PAIN, BILATERAL: Primary | ICD-10-CM

## 2021-10-25 DIAGNOSIS — M79.672 FOOT PAIN, BILATERAL: Primary | ICD-10-CM

## 2021-10-25 PROCEDURE — 11721 DEBRIDE NAIL 6 OR MORE: CPT | Performed by: PODIATRIST

## 2021-10-25 NOTE — PROGRESS NOTES
[facility] - PODIATRY    Today's Date: 10/25/21    Patient Name: Vladimir Carter  MRN: [id number]  CSN: [id number]  PCP: Mango Kumar DO  Referring Provider: No ref. provider found    SUBJECTIVE     Chief Complaint   Patient presents with   • Left Foot - Nail Problem   • Right Foot - Nail Problem     HPI: Vladimir Carter, a 89 y.o.male, comes to clinic.    New, Established, New Problem:  established   Location:  Toenails  Duration:   Greater than five years  Onset:  Gradual  Nature:  sore with palpation.  Stable, worsening, improving:   Stable  Aggravating factors:  Pain with shoe gear and ambulation.  Previous Treatment:  debridement    Patient reports the following medical changes since their last visit: None.    No other pedal complaints at this time.    Patient denies any fevers, chills, nausea, vomiting, shortness of breathe, nor any other constitutional signs nor symptoms.       Last PCP Visit:  Mango Kumar DO, 03 Oct 2001    Past Medical History:   Diagnosis Date   • Abnormal bone density screening 09/29/2019    Spine-Osteopenia Hps-Osteoporosis   • Benign non-nodular prostatic hyperplasia with lower urinary tract symptoms 10/29/2018   • CAD (coronary artery disease)    • Chronic pain syndrome    • CKD (chronic kidney disease) stage 3, GFR 30-59 ml/min ([facility])    • GERD without esophagitis 09/16/2016   • H/O prostate cancer    • HTN (hypertension)    • Long term (current) use of anticoagulants    • Lupus anticoagulant disorder (HCC)    • Moderate exercise     Active but no formal exercise   • BENJA (obstructive sleep apnea)    • Osteoarthritis of left knee 04/22/2016    end-stage   • Osteoporosis     Osteoporosis in the hips/femur no fracture   • Overweight (BMI 25.0-29.9)    • Pedal edema 7/12/2021   • Pulmonary HTN (HCC)    • Severe tricuspid regurgitation 05/2018    ECHO (TTE) Diastolic dysfunction, Pulmonary HTN and Severe Tricuspid regurgitation   • Tinea unguium 01/24/2019     Past  Surgical History:   Procedure Laterality Date   • CHOLECYSTECTOMY     • COLONOSCOPY     • CYSTOSCOPY     • HERNIA REPAIR     • OTHER SURGICAL HISTORY      Brachytherapy   • REPLACEMENT TOTAL KNEE Left 05/10/2017   • REPLACEMENT TOTAL KNEE Right    • WRIST SURGERY       Family History   Problem Relation Age of Onset   • Heart disease Mother    • Hypertension Mother    • Breast cancer Daughter         Breast Neoplasm, Malignant   • Cancer Daughter         Unspecified   • Diabetes Daughter         Unspecified type     Social History     Socioeconomic History   • Marital status:    Tobacco Use   • Smoking status: Former Smoker   • Smokeless tobacco: Never Used   Vaping Use   • Vaping Use: Never used   Substance and Sexual Activity   • Alcohol use: Never   • Drug use: Never   • Sexual activity: Defer     No Known Allergies  Current Outpatient Medications   Medication Sig Dispense Refill   • ALBUTEROL IN As Needed.     • amLODIPine (NORVASC) 5 MG tablet Take 5 mg by mouth Daily.     • CBD (cannabidiol) oral oil Take  by mouth.     • cetirizine (zyrTEC) 10 MG tablet Take 1 tablet by mouth Daily. Indications: Hayfever 90 tablet 3   • Cholecalciferol 50 MCG (2000 UT) capsule Vitamin D3 2,000 unit oral capsule take 1 capsule by oral route daily   Suspended     • DHA-EPA-FLAXSEED OIL-VITAMIN E PO Take  by mouth.     • docusate sodium (COLACE) 100 MG capsule Take 1 capsule by mouth 2 (Two) Times a Day. Indications: Constipation 180 capsule 3   • Eliquis 2.5 MG tablet tablet Take 2.5 mg by mouth 2 (two) times a day.     • fluticasone (Flonase) 50 MCG/ACT nasal spray into the nostril(s) as directed by provider.     • furosemide (Lasix) 20 MG tablet Take 1 tablet by mouth 2 (two) times a day. 180 tablet 3   • Magnesium 100 MG capsule magnesium 250 mg oral tablet take 1 tablet by oral route daily   Suspended     • metoprolol succinate XL (TOPROL-XL) 50 MG 24 hr tablet Take 1 tablet by mouth Daily. 90 tablet 3   • Mirabegron  ER (Myrbetriq) 25 MG tablet sustained-release 24 hour 24 hr tablet Myrbetriq 25 mg oral tablet extended release 24 hr take 1 tablet (25 mg) by oral route once daily swallowing whole with water. Do not crush, chew and/or divide. for 90 days 3/2/2021  Active     • multivitamin with minerals (MULTIVITAMIN ADULTS PO) multivitamin oral tablet take 1 tablet by oral route daily   Suspended     • multivitamin with minerals (PRESERVISION AREDS PO) PreserVision AREDS 7,160-113-100 unit-mg-unit oral tablet take 1 tablet by oral route daily   Active     • pantoprazole (PROTONIX) 40 MG EC tablet      • potassium chloride (K-DUR,KLOR-CON) 20 MEQ CR tablet Take 20 mEq by mouth Daily.     • Restasis MultiDose 0.05 % ophthalmic emulsion Daily.     • spironolactone (ALDACTONE) 25 MG tablet Take 1 tablet by mouth Daily. 90 tablet 3   • tamsulosin (FLOMAX) 0.4 MG capsule 24 hr capsule tamsulosin 0.4 mg capsule     • telmisartan (MICARDIS) 80 MG tablet Take 1 tablet by mouth Daily. 90 tablet 3     No current facility-administered medications for this visit.     Review of Systems   Constitutional: Negative.    Skin:        Painful toenails.   All other systems reviewed and are negative.      OBJECTIVE     Vitals:    10/25/21 0953   Pulse: 75   Temp: 96.9 °F (36.1 °C)   SpO2: 98%       Patient seen in no apparent distress.      PHYSICAL EXAM:     Foot/Ankle Exam:       General:   Appearance: elderly    Orientation: AAOx3    Affect: appropriate    Gait: antalgic    Assistance: cane    Shoe Gear:  Casual shoes    VASCULAR      Right Foot Vascularity   Normal vascular exam    Dorsalis pedis:  1+  Posterior tibial:  1+  Skin Temperature: cool    Edema Gradin+ and pitting  CFT:  < 3 seconds  Pedal Hair Growth:  Present  Varicosities: mild varicosities       Left Foot Vascularity   Normal vascular exam    Dorsalis pedis:  2+  Posterior tibial:  1+  Skin Temperature: cool    Edema Gradin+ and pitting  CFT:  < 3 seconds  Pedal Hair  Growth:  Present  Varicosities: mild varicosities        NEUROLOGIC     Right Foot Neurologic   Normal sensation    Light touch sensation:  Normal  Vibratory sensation:  Normal  Hot/Cold sensation: normal    Protective Sensation using Mckenna-Martin Monofilament:  10     Left Foot Neurologic   Normal sensation    Light touch sensation:  Normal  Vibratory sensation:  Normal  Hot/cold sensation: normal    Protective Sensation using Mckenna-Martin Monofilament:  10     MUSCLE STRENGTH     Right Foot Muscle Strength   Normal strength    Foot dorsiflexion:  5  Foot plantar flexion:  5  Foot inversion:  5  Foot eversion:  5     Left Foot Muscle Strength   Normal strength    Foot dorsiflexion:  5  Foot plantar flexion:  5  Foot inversion:  5  Foot eversion:  5     RANGE OF MOTION      Right Foot Range of Motion   Foot and ankle ROM within normal limits       Left Foot Range of Motion   Foot and ankle ROM within normal limits       DERMATOLOGIC     Right Foot Dermatologic   Skin: skin intact    Nails: onychomycosis, abnormally thick, subungual debris, dystrophic nails and ingrown toenail    Nails comment:  Toenails 1, 2, 3, 4, and 5     Left Foot Dermatologic   Skin: skin intact    Nails: onychomycosis, abnormally thick, subungual debris, dystrophic nails and ingrown toenail    Nails comment:  Toenails 1, 2, 3, 4, and 5      ASSESSMENT/PLAN     Diagnoses and all orders for this visit:    1. Foot pain, bilateral (Primary)    2. Onychomycosis    3. Onychocryptosis    4. Difficulty walking        Comprehensive lower extremity examination and evaluation was performed.    Discussed findings and treatment plan including risks, benefits, and treatment options with patient in detail. Patient agreed with treatment plan.    Toenails 1 through 5 bilaterally were debrided in thickness and length and then smoothed with a Dremel Tool.  Tolerated the procedure well without complications.    An After Visit Summary was printed and given  to the patient at discharge, including (if requested) any available informative/educational handouts regarding diagnosis, treatment, or medications. All questions were answered to patient/family satisfaction. Should symptoms fail to improve or worsen they agree to call or return to clinic or to go to the Emergency Department. Discussed the importance of following up with any needed screening tests/labs/specialist appointments and any requested follow-up recommended by me today. Importance of maintaining follow-up discussed and patient accepts that missed appointments can delay diagnosis and potentially lead to worsening of conditions.    Return in about 9 weeks (around 12/27/2021) for Toenail Care., or sooner if acute issues arise.    This document has been electronically signed by Enzo Ferrell DPM on October 25, 2021 10:03 EDT

## 2021-10-28 ENCOUNTER — PATIENT MESSAGE (OUTPATIENT)
Dept: CARDIOLOGY | Facility: CLINIC | Age: 86
End: 2021-10-28

## 2022-01-03 DIAGNOSIS — Z12.5 PROSTATE CANCER SCREENING: ICD-10-CM

## 2022-01-03 DIAGNOSIS — N40.0 BENIGN PROSTATIC HYPERPLASIA, UNSPECIFIED WHETHER LOWER URINARY TRACT SYMPTOMS PRESENT: Primary | ICD-10-CM

## 2022-01-14 ENCOUNTER — OFFICE VISIT (OUTPATIENT)
Dept: FAMILY MEDICINE CLINIC | Facility: CLINIC | Age: 87
End: 2022-01-14

## 2022-01-14 ENCOUNTER — LAB (OUTPATIENT)
Dept: LAB | Facility: HOSPITAL | Age: 87
End: 2022-01-14

## 2022-01-14 VITALS
DIASTOLIC BLOOD PRESSURE: 56 MMHG | HEART RATE: 62 BPM | TEMPERATURE: 97.6 F | HEIGHT: 71 IN | OXYGEN SATURATION: 95 % | WEIGHT: 214.3 LBS | SYSTOLIC BLOOD PRESSURE: 173 MMHG | BODY MASS INDEX: 30 KG/M2

## 2022-01-14 DIAGNOSIS — R73.03 PREDIABETES: ICD-10-CM

## 2022-01-14 DIAGNOSIS — R29.898 WEAKNESS OF BOTH LOWER EXTREMITIES: ICD-10-CM

## 2022-01-14 DIAGNOSIS — Z86.718 HISTORY OF DVT (DEEP VEIN THROMBOSIS): ICD-10-CM

## 2022-01-14 DIAGNOSIS — Z12.5 PROSTATE CANCER SCREENING: ICD-10-CM

## 2022-01-14 DIAGNOSIS — J30.89 CHRONIC NONSEASONAL ALLERGIC RHINITIS DUE TO FUNGAL SPORES: ICD-10-CM

## 2022-01-14 DIAGNOSIS — I10 ESSENTIAL HYPERTENSION: ICD-10-CM

## 2022-01-14 DIAGNOSIS — R53.1 WEAKNESS: Primary | ICD-10-CM

## 2022-01-14 DIAGNOSIS — E78.5 HYPERLIPIDEMIA LDL GOAL <100: ICD-10-CM

## 2022-01-14 DIAGNOSIS — N40.0 BENIGN PROSTATIC HYPERPLASIA, UNSPECIFIED WHETHER LOWER URINARY TRACT SYMPTOMS PRESENT: ICD-10-CM

## 2022-01-14 LAB
ALBUMIN SERPL-MCNC: 4 G/DL (ref 3.5–5.2)
ALBUMIN/GLOB SERPL: 1.5 G/DL
ALP SERPL-CCNC: 57 U/L (ref 39–117)
ALT SERPL W P-5'-P-CCNC: 7 U/L (ref 1–41)
ANION GAP SERPL CALCULATED.3IONS-SCNC: 10 MMOL/L (ref 5–15)
AST SERPL-CCNC: 22 U/L (ref 1–40)
BASOPHILS # BLD AUTO: 0.05 10*3/MM3 (ref 0–0.2)
BASOPHILS NFR BLD AUTO: 0.6 % (ref 0–1.5)
BILIRUB SERPL-MCNC: 0.3 MG/DL (ref 0–1.2)
BUN SERPL-MCNC: 22 MG/DL (ref 8–23)
BUN/CREAT SERPL: 15.7 (ref 7–25)
CALCIUM SPEC-SCNC: 9.9 MG/DL (ref 8.2–9.6)
CHLORIDE SERPL-SCNC: 104 MMOL/L (ref 98–107)
CHOLEST SERPL-MCNC: 129 MG/DL (ref 0–200)
CO2 SERPL-SCNC: 27 MMOL/L (ref 22–29)
CREAT SERPL-MCNC: 1.4 MG/DL (ref 0.76–1.27)
DEPRECATED RDW RBC AUTO: 46.4 FL (ref 37–54)
EOSINOPHIL # BLD AUTO: 0.07 10*3/MM3 (ref 0–0.4)
EOSINOPHIL NFR BLD AUTO: 0.9 % (ref 0.3–6.2)
ERYTHROCYTE [DISTWIDTH] IN BLOOD BY AUTOMATED COUNT: 13.2 % (ref 12.3–15.4)
GFR SERPL CREATININE-BSD FRML MDRD: 47 ML/MIN/1.73
GLOBULIN UR ELPH-MCNC: 2.7 GM/DL
GLUCOSE SERPL-MCNC: 105 MG/DL (ref 65–99)
HBA1C MFR BLD: 6.59 % (ref 4.8–5.6)
HCT VFR BLD AUTO: 47.3 % (ref 37.5–51)
HDLC SERPL-MCNC: 46 MG/DL (ref 40–60)
HGB BLD-MCNC: 15.7 G/DL (ref 13–17.7)
IMM GRANULOCYTES # BLD AUTO: 0.02 10*3/MM3 (ref 0–0.05)
IMM GRANULOCYTES NFR BLD AUTO: 0.2 % (ref 0–0.5)
LDLC SERPL CALC-MCNC: 69 MG/DL (ref 0–100)
LDLC/HDLC SERPL: 1.5 {RATIO}
LYMPHOCYTES # BLD AUTO: 1.89 10*3/MM3 (ref 0.7–3.1)
LYMPHOCYTES NFR BLD AUTO: 23.2 % (ref 19.6–45.3)
MCH RBC QN AUTO: 31.7 PG (ref 26.6–33)
MCHC RBC AUTO-ENTMCNC: 33.2 G/DL (ref 31.5–35.7)
MCV RBC AUTO: 95.4 FL (ref 79–97)
MONOCYTES # BLD AUTO: 0.96 10*3/MM3 (ref 0.1–0.9)
MONOCYTES NFR BLD AUTO: 11.8 % (ref 5–12)
NEUTROPHILS NFR BLD AUTO: 5.16 10*3/MM3 (ref 1.7–7)
NEUTROPHILS NFR BLD AUTO: 63.3 % (ref 42.7–76)
NRBC BLD AUTO-RTO: 0 /100 WBC (ref 0–0.2)
PLATELET # BLD AUTO: 205 10*3/MM3 (ref 140–450)
PMV BLD AUTO: 10.4 FL (ref 6–12)
POTASSIUM SERPL-SCNC: 4.9 MMOL/L (ref 3.5–5.2)
PROT SERPL-MCNC: 6.7 G/DL (ref 6–8.5)
PSA SERPL-MCNC: <0.014 NG/ML (ref 0–4)
RBC # BLD AUTO: 4.96 10*6/MM3 (ref 4.14–5.8)
SODIUM SERPL-SCNC: 141 MMOL/L (ref 136–145)
TRIGL SERPL-MCNC: 70 MG/DL (ref 0–150)
VLDLC SERPL-MCNC: 14 MG/DL (ref 5–40)
WBC NRBC COR # BLD: 8.15 10*3/MM3 (ref 3.4–10.8)

## 2022-01-14 PROCEDURE — 1159F MED LIST DOCD IN RCRD: CPT | Performed by: FAMILY MEDICINE

## 2022-01-14 PROCEDURE — 80053 COMPREHEN METABOLIC PANEL: CPT

## 2022-01-14 PROCEDURE — 85025 COMPLETE CBC W/AUTO DIFF WBC: CPT

## 2022-01-14 PROCEDURE — G0103 PSA SCREENING: HCPCS

## 2022-01-14 PROCEDURE — G0439 PPPS, SUBSEQ VISIT: HCPCS | Performed by: FAMILY MEDICINE

## 2022-01-14 PROCEDURE — 80061 LIPID PANEL: CPT

## 2022-01-14 PROCEDURE — 1170F FXNL STATUS ASSESSED: CPT | Performed by: FAMILY MEDICINE

## 2022-01-14 PROCEDURE — 83036 HEMOGLOBIN GLYCOSYLATED A1C: CPT

## 2022-01-14 PROCEDURE — 36415 COLL VENOUS BLD VENIPUNCTURE: CPT

## 2022-01-14 RX ORDER — IPRATROPIUM BROMIDE 42 UG/1
2 SPRAY, METERED NASAL 4 TIMES DAILY
Qty: 15 ML | Refills: 5 | Status: SHIPPED | OUTPATIENT
Start: 2022-01-14

## 2022-01-14 NOTE — PROGRESS NOTES
The ABCs of the Annual Wellness Visit  Subsequent Medicare Wellness Visit    Chief Complaint   Patient presents with   • Medicare Wellness-subsequent   • patient has trouble walking long distance      Subjective    History of Present Illness:  Vladimir Carter is a 89 y.o. male who presents for a Subsequent Medicare Wellness Visit.        Patient is able to lift and transfer from upper extremity has lower leg weakness and needs assistance with wheelchair, lightweight so able to get in and out of the care with the wheelchair and has assistance with family to be able to use and transport him and wheelchair to appointments and other.     The following portions of the patient's history were reviewed and   updated as appropriate: allergies, current medications, past family history, past medical history, past social history, past surgical history and problem list.    Compared to one year ago, the patient feels his physical   health is the same.    Compared to one year ago, the patient feels his mental   health is the same.    Recent Hospitalizations:  He was not admitted to the hospital during the last year.       Current Medical Providers:  Patient Care Team:  Mango Kumar DO as PCP - General (Family Medicine)    Outpatient Medications Prior to Visit   Medication Sig Dispense Refill   • ALBUTEROL IN As Needed.     • amLODIPine (NORVASC) 5 MG tablet Take 5 mg by mouth Daily.     • CBD (cannabidiol) oral oil Take  by mouth.     • cetirizine (zyrTEC) 10 MG tablet Take 1 tablet by mouth Daily. Indications: Hayfever 90 tablet 3   • Cholecalciferol 50 MCG (2000 UT) capsule Vitamin D3 2,000 unit oral capsule take 1 capsule by oral route daily   Suspended     • DHA-EPA-FLAXSEED OIL-VITAMIN E PO Take  by mouth.     • docusate sodium (COLACE) 100 MG capsule Take 1 capsule by mouth 2 (Two) Times a Day. Indications: Constipation 180 capsule 3   • Eliquis 2.5 MG tablet tablet Take 2.5 mg by mouth 2 (two) times a day.     •  fluticasone (Flonase) 50 MCG/ACT nasal spray into the nostril(s) as directed by provider.     • furosemide (Lasix) 20 MG tablet Take 1 tablet by mouth 2 (two) times a day. 180 tablet 3   • metoprolol succinate XL (TOPROL-XL) 50 MG 24 hr tablet Take 1 tablet by mouth Daily. 90 tablet 3   • Mirabegron ER (Myrbetriq) 25 MG tablet sustained-release 24 hour 24 hr tablet Myrbetriq 25 mg oral tablet extended release 24 hr take 1 tablet (25 mg) by oral route once daily swallowing whole with water. Do not crush, chew and/or divide. for 90 days 3/2/2021  Active     • multivitamin with minerals (MULTIVITAMIN ADULTS PO) multivitamin oral tablet take 1 tablet by oral route daily   Suspended     • pantoprazole (PROTONIX) 40 MG EC tablet      • Restasis MultiDose 0.05 % ophthalmic emulsion Daily.     • spironolactone (ALDACTONE) 25 MG tablet Take 1 tablet by mouth Daily. 90 tablet 3   • tamsulosin (FLOMAX) 0.4 MG capsule 24 hr capsule tamsulosin 0.4 mg capsule     • telmisartan (MICARDIS) 80 MG tablet Take 1 tablet by mouth Daily. 90 tablet 3   • Magnesium 100 MG capsule magnesium 250 mg oral tablet take 1 tablet by oral route daily   Suspended     • multivitamin with minerals (PRESERVISION AREDS PO) PreserVision AREDS 7,160-113-100 unit-mg-unit oral tablet take 1 tablet by oral route daily   Active     • potassium chloride (K-DUR,KLOR-CON) 20 MEQ CR tablet Take 20 mEq by mouth Daily.       No facility-administered medications prior to visit.       No opioid medication identified on active medication list. I have reviewed chart for other potential  high risk medication/s and harmful drug interactions in the elderly.          Aspirin is not on active medication list.  Aspirin use is not indicated based on review of current medical condition/s. Risk of harm outweighs potential benefits.  .    Patient Active Problem List   Diagnosis   • CKD (chronic kidney disease) stage 3, GFR 30-59 ml/min ([facility])   • Essential hypertension   •  "Hyperlipidemia LDL goal <100   • History of DVT (deep vein thrombosis)   • Pedal edema   • Bradycardia, sinus   • Osteoporosis   • Chronic pain disorder   • Benign non-nodular prostatic hyperplasia with lower urinary tract symptoms   • Esophageal reflux   • H/O prostate cancer   • Lumbar spondylosis   • BENJA (obstructive sleep apnea)   • Overweight (BMI 25.0-29.9)   • Spinal stenosis of lumbar region   • Tricuspid valve insufficiency   • Essential tremor     Advance Care Planning  Advance Directive is on file.  ACP discussion was declined by the patient. Patient has an advance directive in EMR which is still valid.           Objective    Vitals:    01/14/22 0851 01/14/22 0901   BP: (!) 183/61 173/56   Pulse: 62    Temp: 97.6 °F (36.4 °C)    TempSrc: Temporal    SpO2: 95%    Weight: 97.2 kg (214 lb 4.8 oz)    Height: 180.3 cm (71\")      BMI Readings from Last 1 Encounters:   01/14/22 29.89 kg/m²   BMI is above normal parameters. Recommendations include: educational material and exercise counseling    Does the patient have evidence of cognitive impairment? No    Physical Exam  Constitutional:       Appearance: Normal appearance.   HENT:      Head: Normocephalic.      Right Ear: Tympanic membrane normal.      Left Ear: Tympanic membrane normal.      Nose: Nose normal.      Mouth/Throat:      Mouth: Mucous membranes are dry.   Eyes:      Extraocular Movements: Extraocular movements intact.      Pupils: Pupils are equal, round, and reactive to light.   Cardiovascular:      Pulses: Normal pulses.      Heart sounds: Normal heart sounds.   Pulmonary:      Effort: Pulmonary effort is normal.      Breath sounds: Normal breath sounds.   Abdominal:      General: Abdomen is flat.   Skin:     General: Skin is warm.   Neurological:      Mental Status: He is alert.      Motor: Weakness present.       Lab Results   Component Value Date    TRIG 70 01/14/2022    HDL 46 01/14/2022    LDL 69 01/14/2022    VLDL 14 01/14/2022    HGBA1C " 6.59 (H) 01/14/2022            HEALTH RISK ASSESSMENT    Smoking Status:  Social History     Tobacco Use   Smoking Status Former Smoker   Smokeless Tobacco Never Used     Alcohol Consumption:  Social History     Substance and Sexual Activity   Alcohol Use Never     Fall Risk Screen:    DORIS Fall Risk Assessment was completed, and patient is at LOW risk for falls.Assessment completed on:1/14/2022    Depression Screening:  PHQ-2/PHQ-9 Depression Screening 1/14/2022   Little interest or pleasure in doing things 0   Feeling down, depressed, or hopeless 0   Total Score 0       Health Habits and Functional and Cognitive Screening:  Functional & Cognitive Status 1/14/2022   Do you have difficulty preparing food and eating? No   Do you have difficulty bathing yourself, getting dressed or grooming yourself? No   Do you have difficulty using the toilet? No   Do you have difficulty moving around from place to place? No   Do you have trouble with steps or getting out of a bed or a chair? No   Current Diet Well Balanced Diet   Dental Exam Up to date   Eye Exam Up to date   Exercise (times per week) 7 times per week   Current Exercises Include Walking   Do you need help using the phone?  No   Are you deaf or do you have serious difficulty hearing?  Yes   Do you need help with transportation? Yes   Do you need help shopping? No   Do you need help preparing meals?  No   Do you need help with housework?  No   Do you need help with laundry? No   Do you need help taking your medications? Yes   Do you need help managing money? Yes   Do you ever drive or ride in a car without wearing a seat belt? No   Have you felt unusual stress, anger or loneliness in the last month? No   Who do you live with? Child   If you need help, do you have trouble finding someone available to you? No   Have you been bothered in the last four weeks by sexual problems? No   Do you have difficulty concentrating, remembering or making decisions? No        Age-appropriate Screening Schedule:  Refer to the list below for future screening recommendations based on patient's age, sex and/or medical conditions. Orders for these recommended tests are listed in the plan section. The patient has been provided with a written plan.    Health Maintenance   Topic Date Due   • ZOSTER VACCINE (1 of 2) Never done   • TDAP/TD VACCINES (1 - Tdap) 01/14/2023 (Originally 8/28/1946)   • DXA SCAN  11/23/2022   • LIPID PANEL  01/14/2023   • INFLUENZA VACCINE  Completed              Assessment/Plan   CMS Preventative Services Quick Reference  Risk Factors Identified During Encounter  vaccinations and monitoring blood pressure at home  The above risks/problems have been discussed with the patient.  Follow up actions/plans if indicated are seen below in the Assessment/Plan Section.  Pertinent information has been shared with the patient in the After Visit Summary.    Diagnoses and all orders for this visit:    1. Weakness (Primary)  -     Lightweight Wheelchair    2. Weakness of both lower extremities  -     Lightweight Wheelchair    3. Chronic nonseasonal allergic rhinitis due to fungal spores  -     ipratropium (ATROVENT) 0.06 % nasal spray; 2 sprays into the nostril(s) as directed by provider 4 (Four) Times a Day.  Dispense: 15 mL; Refill: 5    4. Prediabetes  -     Hemoglobin A1c; Future    Labs today will refer to get a light weight wheelchair for patient from either transport recommend vaccinations for patient to update file given patient's age and risk monitor blood pressure at home 173/56 today better at home        Follow Up:   Return in about 3 months (around 4/14/2022), or if symptoms worsen or fail to improve.     An After Visit Summary and PPPS were made available to the patient.

## 2022-01-25 ENCOUNTER — TELEPHONE (OUTPATIENT)
Dept: FAMILY MEDICINE CLINIC | Facility: CLINIC | Age: 87
End: 2022-01-25

## 2022-01-25 NOTE — TELEPHONE ENCOUNTER
Caller: TORI OLIVER    Relationship: Emergency Contact    Best call back number: [phone]     Equipment requested: WHEEL CHAIR    Reason for the request: LEGS GIVING OUT ON HIM    Prescribing Provider: ERLINDA PARISI    Additional information or concerns: DAUGHTER IS WANTING TO KNOW THE STEP IS TO GET THE WHEEL CHAIR, WILL SOMEONE BRING IT TO HIM OR DO THEY NEED TO     PLEASE CALL AND ADVISE DAUGHTER

## 2022-01-26 DIAGNOSIS — N40.0 BENIGN PROSTATIC HYPERPLASIA, UNSPECIFIED WHETHER LOWER URINARY TRACT SYMPTOMS PRESENT: Primary | ICD-10-CM

## 2022-01-26 RX ORDER — TAMSULOSIN HYDROCHLORIDE 0.4 MG/1
CAPSULE ORAL
Qty: 90 CAPSULE | Refills: 3 | Status: SHIPPED | OUTPATIENT
Start: 2022-01-26

## 2022-02-15 PROBLEM — R35.0 BENIGN PROSTATIC HYPERPLASIA WITH URINARY FREQUENCY: Status: ACTIVE | Noted: 2022-02-15

## 2022-02-15 PROBLEM — N40.1 BENIGN PROSTATIC HYPERPLASIA WITH URINARY FREQUENCY: Status: ACTIVE | Noted: 2022-02-15

## 2022-02-15 NOTE — PROGRESS NOTES
Chief Complaint    Urologic complaint    Subjective          Vladimir Carter presents to [facility] UROLOGY  History of Present Illness    89 yo  male who follows up today with history of prostate adenocarcinoma treated with brachytherapy in 2009.      Voiding ok.  No straining. 1 pad, minimal incontinence.    Nocturia X 1.  on Flomax 0.4 mg.   on Myrbetriq 25 mg.  Both medications are helping.  No side effects.    No gross hematuria/burning/dysuria      Previous    10/20 cytology-negative      5/19 cystoscopywide caliber bulbar urethral stricture.  Prostate urethra has a lot of regrowth on the right side.  Essentially none on the left.  Heavily trabeculated bladder shows shallow bladder diverticulum posteriorly.  Just a very small amount of erythema tissue on posterior wall.  4/19 CT urogramnegative, delayed phase does not go all the way into the pelvis.    Does not have erections and is not worried about this.      1/22   1.4, 47    PSA       Patient has a history of [place] 6 adenocarcinoma of the prostate with a PSA at that time of 5.2.  Patient underwent brachytherapy on 3/27/09.      1/22    <0.014  9/20    <0.01  2/18    <0.01  8/17    0.08  8/16    0.02  8/15     0.02  8/14     0.07  12/13   0.04  8/12    0.08  8/11    0.16      PVR     2/22  004  9/18  30  9/17   8       Past History:  Medical History: has a past medical history of Abnormal bone density screening (09/29/2019), Benign non-nodular prostatic hyperplasia with lower urinary tract symptoms (10/29/2018), CAD (coronary artery disease), Chronic pain syndrome, CKD (chronic kidney disease) stage 3, GFR 30-59 ml/min ([facility]), GERD without esophagitis (09/16/2016), H/O prostate cancer, HTN (hypertension), Long term (current) use of anticoagulants, Lupus anticoagulant disorder ([facility]), Moderate exercise, BENJA (obstructive sleep apnea), Osteoarthritis of left knee (04/22/2016), Osteoporosis, Overweight (BMI 25.0-29.9), Pedal edema  (7/12/2021), Pulmonary HTN (HCC), Severe tricuspid regurgitation (05/2018), and Tinea unguium (01/24/2019).   Surgical History: has a past surgical history that includes Other surgical history; Cholecystectomy; Colonoscopy; Cystoscopy; Hernia repair; Replacement total knee (Left, 05/10/2017); Replacement total knee (Right); and Wrist surgery.   Family History: family history includes Breast cancer in his daughter; Cancer in his daughter; Diabetes in his daughter; Heart disease in his mother; Hypertension in his mother.   Social History: reports that he has quit smoking. He has never used smokeless tobacco. He reports that he does not drink alcohol and does not use drugs.  Allergies: Patient has no known allergies.       Current Outpatient Medications:   •  ALBUTEROL IN, As Needed., Disp: , Rfl:   •  amLODIPine (NORVASC) 5 MG tablet, Take 5 mg by mouth Daily., Disp: , Rfl:   •  CBD (cannabidiol) oral oil, Take  by mouth., Disp: , Rfl:   •  cetirizine (zyrTEC) 10 MG tablet, Take 1 tablet by mouth Daily. Indications: Hayfever, Disp: 90 tablet, Rfl: 3  •  Cholecalciferol 50 MCG (2000 UT) capsule, Vitamin D3 2,000 unit oral capsule take 1 capsule by oral route daily   Suspended, Disp: , Rfl:   •  DHA-EPA-FLAXSEED OIL-VITAMIN E PO, Take  by mouth., Disp: , Rfl:   •  docusate sodium (COLACE) 100 MG capsule, Take 1 capsule by mouth 2 (Two) Times a Day. Indications: Constipation, Disp: 180 capsule, Rfl: 3  •  Eliquis 2.5 MG tablet tablet, Take 2.5 mg by mouth 2 (two) times a day., Disp: , Rfl:   •  fluticasone (Flonase) 50 MCG/ACT nasal spray, into the nostril(s) as directed by provider., Disp: , Rfl:   •  furosemide (Lasix) 20 MG tablet, Take 1 tablet by mouth 2 (two) times a day., Disp: 180 tablet, Rfl: 3  •  ipratropium (ATROVENT) 0.06 % nasal spray, 2 sprays into the nostril(s) as directed by provider 4 (Four) Times a Day., Disp: 15 mL, Rfl: 5  •  metoprolol succinate XL (TOPROL-XL) 50 MG 24 hr tablet, Take 1 tablet by  mouth Daily., Disp: 90 tablet, Rfl: 3  •  Mirabegron ER (Myrbetriq) 25 MG tablet sustained-release 24 hour 24 hr tablet, Myrbetriq 25 mg oral tablet extended release 24 hr take 1 tablet (25 mg) by oral route once daily swallowing whole with water. Do not crush, chew and/or divide. for 90 days 3/2/2021  Active, Disp: , Rfl:   •  multivitamin with minerals (MULTIVITAMIN ADULTS PO), multivitamin oral tablet take 1 tablet by oral route daily   Suspended, Disp: , Rfl:   •  pantoprazole (PROTONIX) 40 MG EC tablet, , Disp: , Rfl:   •  Restasis MultiDose 0.05 % ophthalmic emulsion, Daily., Disp: , Rfl:   •  spironolactone (ALDACTONE) 25 MG tablet, Take 1 tablet by mouth Daily., Disp: 90 tablet, Rfl: 3  •  tamsulosin (FLOMAX) 0.4 MG capsule 24 hr capsule, TAKE 1 CAPSULE DAILY 1/2 HOUR FOLLOWING THE SAME MEAL EACH DAY, Disp: 90 capsule, Rfl: 3  •  telmisartan (MICARDIS) 80 MG tablet, Take 1 tablet by mouth Daily., Disp: 90 tablet, Rfl: 3     Physical exam       Alert and orient x3  Well appearing, well developed, in no acute distress   Unlabored respirations        Grossly oriented to person, place and time, judgment is intact, normal mood and affect         Objective     Vital Signs:   There were no vitals taken for this visit.             Assessment and Plan    Diagnoses and all orders for this visit:    1. Benign prostatic hyperplasia with urinary frequency (Primary)      History of prostate cancer    PSA undetectable, no signs of prostate cancer recurrence    PSA in 1 year    Urge incontinence    Continue Myrbetriq 25 mg daily    BPH    Continue Flomax 0.4 mg daily       doing well, follow-up with nurse practitioner in 1 year.

## 2022-02-18 ENCOUNTER — OFFICE VISIT (OUTPATIENT)
Dept: UROLOGY | Facility: CLINIC | Age: 87
End: 2022-02-18

## 2022-02-18 VITALS — BODY MASS INDEX: 30.32 KG/M2 | WEIGHT: 216.6 LBS | HEIGHT: 71 IN | RESPIRATION RATE: 16 BRPM

## 2022-02-18 DIAGNOSIS — R35.0 BENIGN PROSTATIC HYPERPLASIA WITH URINARY FREQUENCY: Primary | ICD-10-CM

## 2022-02-18 DIAGNOSIS — N40.1 BENIGN PROSTATIC HYPERPLASIA WITH URINARY FREQUENCY: Primary | ICD-10-CM

## 2022-02-18 PROBLEM — E78.5 HYPERLIPIDEMIA LDL GOAL <100: Chronic | Status: ACTIVE | Noted: 2021-07-05

## 2022-02-18 LAB — SPECIMEN VOL 24H UR: NORMAL L

## 2022-02-18 PROCEDURE — 51798 US URINE CAPACITY MEASURE: CPT | Performed by: UROLOGY

## 2022-02-18 PROCEDURE — 99213 OFFICE O/P EST LOW 20 MIN: CPT | Performed by: UROLOGY

## 2022-02-25 ENCOUNTER — OFFICE VISIT (OUTPATIENT)
Dept: CARDIOLOGY | Facility: CLINIC | Age: 87
End: 2022-02-25

## 2022-02-25 VITALS
DIASTOLIC BLOOD PRESSURE: 51 MMHG | BODY MASS INDEX: 30.1 KG/M2 | SYSTOLIC BLOOD PRESSURE: 150 MMHG | HEIGHT: 71 IN | WEIGHT: 215 LBS | HEART RATE: 67 BPM

## 2022-02-25 DIAGNOSIS — Z86.718 HISTORY OF DVT (DEEP VEIN THROMBOSIS): ICD-10-CM

## 2022-02-25 DIAGNOSIS — N18.32 STAGE 3B CHRONIC KIDNEY DISEASE: Chronic | ICD-10-CM

## 2022-02-25 DIAGNOSIS — I25.10 MILD CAD: ICD-10-CM

## 2022-02-25 DIAGNOSIS — E78.5 HYPERLIPIDEMIA LDL GOAL <100: ICD-10-CM

## 2022-02-25 DIAGNOSIS — I10 ESSENTIAL HYPERTENSION: Primary | Chronic | ICD-10-CM

## 2022-02-25 PROCEDURE — 99214 OFFICE O/P EST MOD 30 MIN: CPT | Performed by: INTERNAL MEDICINE

## 2022-02-25 RX ORDER — TORSEMIDE 20 MG/1
30 TABLET ORAL DAILY
Qty: 90 TABLET | Refills: 3 | Status: SHIPPED | OUTPATIENT
Start: 2022-02-25 | End: 2023-01-05 | Stop reason: SDUPTHER

## 2022-02-25 RX ORDER — SPIRONOLACTONE 25 MG/1
25 TABLET ORAL EVERY OTHER DAY
Qty: 45 TABLET | Refills: 3 | Status: SHIPPED | OUTPATIENT
Start: 2022-02-25 | End: 2023-02-03 | Stop reason: SDUPTHER

## 2022-02-25 NOTE — PROGRESS NOTES
Office Visit    Chief Complaint  Hypertension and Hyperlipidemia    Subjective            Vladimir Carter presents to [facility] CARDIOLOGY  Mr. Carter is a 89 years old gentleman with previous DVT, hypertension chronic kidney disease who is done reasonably well.  His blood pressure has been not well controlled for the last 2 to 3 weeks.  His daughter checks his blood pressure frequent.  They are brought a blood pressure log.  She has also noticed increased swelling in his feet.  He denies chest pain palpitations dizziness syncope.  He has shortness of breath but it is mild and occurs with moderate exertion      Past Medical History:   Diagnosis Date   • Abnormal bone density screening 09/29/2019    Spine-Osteopenia Hps-Osteoporosis   • Benign non-nodular prostatic hyperplasia with lower urinary tract symptoms 10/29/2018   • CAD (coronary artery disease)    • Chronic pain syndrome    • CKD (chronic kidney disease) stage 3, GFR 30-59 ml/min ([facility])    • GERD without esophagitis 09/16/2016   • H/O prostate cancer    • HTN (hypertension)    • Long term (current) use of anticoagulants    • Lupus anticoagulant disorder ([facility])    • Moderate exercise     Active but no formal exercise   • BENJA (obstructive sleep apnea)    • Osteoarthritis of left knee 04/22/2016    end-stage   • Osteoporosis     Osteoporosis in the hips/femur no fracture   • Overweight (BMI 25.0-29.9)    • Pedal edema 7/12/2021   • Pulmonary HTN ([facility])    • Severe tricuspid regurgitation 05/2018    ECHO (TTE) Diastolic dysfunction, Pulmonary HTN and Severe Tricuspid regurgitation   • Tinea unguium 01/24/2019       No Known Allergies     Past Surgical History:   Procedure Laterality Date   • CHOLECYSTECTOMY     • COLONOSCOPY     • CYSTOSCOPY     • HERNIA REPAIR     • OTHER SURGICAL HISTORY      Brachytherapy   • REPLACEMENT TOTAL KNEE Left 05/10/2017   • REPLACEMENT TOTAL KNEE Right    • WRIST SURGERY          Social History     Tobacco Use   •  Smoking status: Former Smoker   • Smokeless tobacco: Never Used   Vaping Use   • Vaping Use: Never used   Substance Use Topics   • Alcohol use: Never   • Drug use: Never       Family History   Problem Relation Age of Onset   • Heart disease Mother    • Hypertension Mother    • Breast cancer Daughter         Breast Neoplasm, Malignant   • Cancer Daughter         Unspecified   • Diabetes Daughter         Unspecified type        Prior to Admission medications    Medication Sig Start Date End Date Taking? Authorizing Provider   ALBUTEROL IN As Needed.   Yes Marie Rankin MD   amLODIPine (NORVASC) 5 MG tablet Take 5 mg by mouth Daily. 3/22/21  Yes Marie Rankin MD   CBD (cannabidiol) oral oil Take  by mouth.   Yes Marie Rankin MD   cetirizine (zyrTEC) 10 MG tablet Take 1 tablet by mouth Daily. Indications: Hayfever 10/3/21  Yes Mango Kumar DO   Cholecalciferol 50 MCG (2000 UT) capsule Vitamin D3 2,000 unit oral capsule take 1 capsule by oral route daily   Suspended   Yes Marie Rankin MD   DHA-EPA-FLAXSEED OIL-VITAMIN E PO Take  by mouth.   Yes Marie Rankin MD   docusate sodium (COLACE) 100 MG capsule Take 1 capsule by mouth 2 (Two) Times a Day. Indications: Constipation 10/3/21  Yes Mango Kumar DO   Eliquis 2.5 MG tablet tablet Take 2.5 mg by mouth 2 (two) times a day. 7/9/21  Yes Marie Rankin MD   fluticasone (Flonase) 50 MCG/ACT nasal spray into the nostril(s) as directed by provider.   Yes Marie Rankin MD   furosemide (Lasix) 20 MG tablet Take 1 tablet by mouth 2 (two) times a day. 7/12/21  Yes Lucia Moura APRN   ipratropium (ATROVENT) 0.06 % nasal spray 2 sprays into the nostril(s) as directed by provider 4 (Four) Times a Day. 1/14/22  Yes Mango Kumar DO   metoprolol succinate XL (TOPROL-XL) 50 MG 24 hr tablet Take 1 tablet by mouth Daily. 7/12/21  Yes Lucia Moura APRN   Mirabegron ER (Myrbetriq) 25 MG tablet sustained-release 24 hour  "24 hr tablet Myrbetriq 25 mg oral tablet extended release 24 hr take 1 tablet (25 mg) by oral route once daily swallowing whole with water. Do not crush, chew and/or divide. for 90 days 3/2/2021  Active 3/2/21  Yes ProviderMarie MD   multivitamin with minerals (MULTIVITAMIN ADULTS PO) multivitamin oral tablet take 1 tablet by oral route daily   Suspended   Yes ProviderMarie MD   pantoprazole (PROTONIX) 40 MG EC tablet  6/21/21  Yes ProviderMarie MD   Restasis MultiDose 0.05 % ophthalmic emulsion Daily. 8/6/21  Yes ProviderMarie MD   spironolactone (ALDACTONE) 25 MG tablet Take 1 tablet by mouth Daily.  Patient taking differently: Take 12.5 mg by mouth Daily. 7/12/21  Yes Lucia Moura APRN   tamsulosin (FLOMAX) 0.4 MG capsule 24 hr capsule TAKE 1 CAPSULE DAILY 1/2 HOUR FOLLOWING THE SAME MEAL EACH DAY 1/26/22  Yes Ermias Tang MD   telmisartan (MICARDIS) 80 MG tablet Take 1 tablet by mouth Daily. 7/12/21  Yes Lucia Moura, FIORELLA        Review of Systems   Constitutional: Negative for fatigue.   Respiratory: Positive for shortness of breath. Negative for cough.    Cardiovascular: Positive for leg swelling. Negative for chest pain and palpitations.   Neurological: Negative for dizziness.        Objective     /51   Pulse 67   Ht 180.3 cm (71\")   Wt 97.5 kg (215 lb)   BMI 29.99 kg/m²       Physical Exam  Constitutional:       General: He is awake.      Appearance: Normal appearance.   Neck:      Thyroid: No thyromegaly.      Vascular: No carotid bruit or JVD.   Cardiovascular:      Rate and Rhythm: Normal rate and regular rhythm.      Chest Wall: PMI is not displaced.      Pulses: Normal pulses.      Heart sounds: Normal heart sounds, S1 normal and S2 normal. No murmur heard.  No friction rub. No gallop. No S3 or S4 sounds.    Pulmonary:      Effort: Pulmonary effort is normal.      Breath sounds: Normal breath sounds and air entry. No wheezing, rhonchi or rales. "   Abdominal:      General: Bowel sounds are normal.      Palpations: Abdomen is soft. There is no mass.      Tenderness: There is no abdominal tenderness.   Musculoskeletal:      Cervical back: Neck supple.      Right lower le+ Edema present.      Left lower le+ Edema present.   Neurological:      Mental Status: He is alert and oriented to person, place, and time.   Psychiatric:         Mood and Affect: Mood normal.         Behavior: Behavior is cooperative.           Result Review :                      No results found for: PROBNP, BNP  CMP    CMP 21    0652 [id number]     Glucose   106 (A) 105 (A)   BUN   20 22   Creatinine  1.31 (A) 1.34 (A) 1.40 (A)   eGFR Non African Am  51 (A) 50 (A) 47 (A)   Sodium   140 141   Potassium   4.5 4.9   Chloride   104 104   Calcium 9.3  9.0 9.9 (A)   Albumin    4.00   Total Bilirubin    0.3   Alkaline Phosphatase    57   AST (SGOT)    22   ALT (SGPT)    7   (A) Abnormal value            CBC w/diff    CBC w/Diff 21   WBC 7.64 8.15   RBC 4.63 4.96   Hemoglobin 14.8 15.7   Hematocrit 44.7 47.3   MCV 96.5 95.4   MCH 32.0 31.7   MCHC 33.1 33.2   RDW 13.3 13.2   Platelets 182 205   Neutrophil Rel % 53.3 63.3   Immature Granulocyte Rel % 0.3 0.2   Lymphocyte Rel % 30.6 23.2   Monocyte Rel % 12.4 (A) 11.8   Eosinophil Rel % 2.7 0.9   Basophil Rel % 0.7 0.6   (A) Abnormal value             Lipid Panel    Lipid Panel 21   Total Cholesterol 127 129   Triglycerides 55 70   HDL Cholesterol 45 46   VLDL Cholesterol 12 14   LDL Cholesterol  70 69   LDL/HDL Ratio 1.58 1.50            Lab Results   Component Value Date    TSH 2.780 2021      Lab Results   Component Value Date    FREET4 1.16 2021      No results found for: DDIMERQUANT  No results found for: MG   No results found for: DIGOXIN   A1C Last 3 Results    HGBA1C Last 3 Results 22   Hemoglobin A1C 6.59 (A)   (A) Abnormal value                      Assessment and  Plan        Diagnoses and all orders for this visit:    1. Essential hypertension (Primary)  Assessment & Plan:  His blood pressure is not as well-controlled as it used to be in the past.  He also has developed significant pedal edema.  He has chronic kidney disease.  I am going to change his furosemide to torsemide 20 mg 1/2 tablet every day except Sunday.  He will take spironolactone 25 mg every other day.  He will get a chemistry panel in 1 month to make sure that his kidney function does not get much worse.    Orders:  -     spironolactone (ALDACTONE) 25 MG tablet; Take 1 tablet by mouth Every Other Day.  Dispense: 45 tablet; Refill: 3  -     CBC & Differential; Future  -     Comprehensive Metabolic Panel; Future  -     T4, Free; Future  -     TSH; Future  -     Lipid Panel; Future  -     Comprehensive Metabolic Panel; Future  -     Magnesium; Future    2. Hyperlipidemia LDL goal <100  Assessment & Plan:  Lipids are at goal on dietary management    Orders:  -     CBC & Differential; Future  -     Comprehensive Metabolic Panel; Future  -     T4, Free; Future  -     TSH; Future  -     Lipid Panel; Future  -     Comprehensive Metabolic Panel; Future  -     Magnesium; Future    3. Stage 3b chronic kidney disease (HCC)  Assessment & Plan:  His chronic kidney disease is stable at present.  I am going to repeat his chemistry panel in a month to make sure he does not get significantly worse with  change in his diuretic    Orders:  -     CBC & Differential; Future  -     Comprehensive Metabolic Panel; Future  -     T4, Free; Future  -     TSH; Future  -     Lipid Panel; Future  -     Comprehensive Metabolic Panel; Future  -     Magnesium; Future    4. Mild CAD  -     CBC & Differential; Future  -     Comprehensive Metabolic Panel; Future  -     T4, Free; Future  -     TSH; Future  -     Lipid Panel; Future  -     Comprehensive Metabolic Panel; Future  -     Magnesium; Future    5. History of DVT (deep vein  thrombosis)    Other orders  -     torsemide (Demadex) 20 MG tablet; Take 1.5 tablets by mouth Daily. 1.5 tab daily except sunday  Dispense: 90 tablet; Refill: 3    His family will bring in his blood pressure log that he will start in 10 days.  He will continue the rest of his medications as is including amlodipine telmisartan and Eliquis      Follow Up     Return in about 4 months (around 6/25/2022) for Office visit ok for thursday if necessary., EKG with next office visit.    Patient was given instructions and counseling regarding his condition or for health maintenance advice. Please see specific information pulled into the AVS if appropriate.     Paxton Luis MD  02/25/22 09:12 EST

## 2022-02-25 NOTE — ASSESSMENT & PLAN NOTE
His chronic kidney disease is stable at present.  I am going to repeat his chemistry panel in a month to make sure he does not get significantly worse with  change in his diuretic

## 2022-02-25 NOTE — ASSESSMENT & PLAN NOTE
His blood pressure is not as well-controlled as it used to be in the past.  He also has developed significant pedal edema.  He has chronic kidney disease.  I am going to change his furosemide to torsemide 20 mg 1/2 tablet every day except Sunday.  He will take spironolactone 25 mg every other day.  He will get a chemistry panel in 1 month to make sure that his kidney function does not get much worse.

## 2022-02-28 ENCOUNTER — TRANSCRIBE ORDERS (OUTPATIENT)
Dept: LAB | Facility: HOSPITAL | Age: 87
End: 2022-02-28

## 2022-02-28 ENCOUNTER — LAB (OUTPATIENT)
Dept: LAB | Facility: HOSPITAL | Age: 87
End: 2022-02-28

## 2022-02-28 DIAGNOSIS — R35.0 BENIGN PROSTATIC HYPERPLASIA WITH URINARY FREQUENCY: ICD-10-CM

## 2022-02-28 DIAGNOSIS — Z79.899 ENCOUNTER FOR LONG-TERM (CURRENT) USE OF OTHER MEDICATIONS: ICD-10-CM

## 2022-02-28 DIAGNOSIS — M81.0 SENILE OSTEOPOROSIS: Primary | ICD-10-CM

## 2022-02-28 DIAGNOSIS — M81.0 SENILE OSTEOPOROSIS: ICD-10-CM

## 2022-02-28 DIAGNOSIS — N40.1 BENIGN PROSTATIC HYPERPLASIA WITH URINARY FREQUENCY: ICD-10-CM

## 2022-02-28 LAB
25(OH)D3 SERPL-MCNC: 63.7 NG/ML
CALCIUM SPEC-SCNC: 9.4 MG/DL (ref 8.2–9.6)
CREAT SERPL-MCNC: 1.29 MG/DL (ref 0.76–1.27)
GFR SERPL CREATININE-BSD FRML MDRD: 52 ML/MIN/1.73

## 2022-02-28 PROCEDURE — 82306 VITAMIN D 25 HYDROXY: CPT

## 2022-02-28 PROCEDURE — 82310 ASSAY OF CALCIUM: CPT

## 2022-02-28 PROCEDURE — 36415 COLL VENOUS BLD VENIPUNCTURE: CPT

## 2022-02-28 PROCEDURE — 82565 ASSAY OF CREATININE: CPT

## 2022-03-02 ENCOUNTER — TELEPHONE (OUTPATIENT)
Dept: CARDIOLOGY | Facility: CLINIC | Age: 87
End: 2022-03-02

## 2022-03-02 NOTE — TELEPHONE ENCOUNTER
Patient's daughter called for clarification on changed made to medications at the patients last OV.     Informed her that he should stop Lasix and start torsemide 20 mh 0.5 tab QD and spironolactone 25 mg qod.     She voiced understanding.

## 2022-03-03 ENCOUNTER — TRANSCRIBE ORDERS (OUTPATIENT)
Dept: ADMINISTRATIVE | Facility: HOSPITAL | Age: 87
End: 2022-03-03

## 2022-03-03 DIAGNOSIS — M81.0 OSTEOPOROSIS OF MULTIPLE SITES: Primary | ICD-10-CM

## 2022-03-04 ENCOUNTER — OFFICE VISIT (OUTPATIENT)
Dept: PODIATRY | Facility: CLINIC | Age: 87
End: 2022-03-04

## 2022-03-04 VITALS
OXYGEN SATURATION: 95 % | DIASTOLIC BLOOD PRESSURE: 89 MMHG | TEMPERATURE: 96.9 F | HEIGHT: 71 IN | WEIGHT: 214 LBS | SYSTOLIC BLOOD PRESSURE: 145 MMHG | HEART RATE: 55 BPM | BODY MASS INDEX: 29.96 KG/M2

## 2022-03-04 DIAGNOSIS — L60.0 ONYCHOCRYPTOSIS: ICD-10-CM

## 2022-03-04 DIAGNOSIS — M79.671 FOOT PAIN, BILATERAL: Primary | ICD-10-CM

## 2022-03-04 DIAGNOSIS — B35.1 ONYCHOMYCOSIS: ICD-10-CM

## 2022-03-04 DIAGNOSIS — M79.672 FOOT PAIN, BILATERAL: Primary | ICD-10-CM

## 2022-03-04 DIAGNOSIS — R26.2 DIFFICULTY WALKING: ICD-10-CM

## 2022-03-04 PROBLEM — M81.0 SENILE OSTEOPOROSIS: Status: ACTIVE | Noted: 2022-03-04

## 2022-03-04 PROCEDURE — 11721 DEBRIDE NAIL 6 OR MORE: CPT | Performed by: PODIATRIST

## 2022-03-04 NOTE — PROGRESS NOTES
[facility]    Today's Date: 03/04/22    Patient Name: Vladimir Carter  MRN: [id number]  CSN: [id number]  PCP: Mango Kumar DO, Last PCP Visit: 25 January 2022  Referring Provider: No ref. provider found    SUBJECTIVE     Chief Complaint   Patient presents with   • Left Foot - Nail Problem   • Right Foot - Nail Problem     HPI: Vladimir aCrter, a 89 y.o.male, comes to clinic.    New, Established, New Problem:  established   Location:  Toenails  Duration:   Greater than five years  Onset:  Gradual  Nature:  sore with palpation.  Stable, worsening, improving:   Stable  Aggravating factors:  Pain with shoe gear and ambulation.  Previous Treatment:  debridement    Patient reports the following medical changes since their last visit: Changes in blood pressure medication dosages    No other pedal complaints at this time.    Patient denies any fevers, chills, nausea, vomiting, shortness of breathe, nor any other constitutional signs nor symptoms.         Past Medical History:   Diagnosis Date   • Abnormal bone density screening 09/29/2019    Spine-Osteopenia Hps-Osteoporosis   • Benign non-nodular prostatic hyperplasia with lower urinary tract symptoms 10/29/2018   • Bunion    • CAD (coronary artery disease)    • Callus    • Chronic pain syndrome    • CKD (chronic kidney disease) stage 3, GFR 30-59 ml/min ([facility])    • GERD without esophagitis 09/16/2016   • H/O prostate cancer    • HTN (hypertension)    • Ingrown toenail    • Long term (current) use of anticoagulants    • Lupus anticoagulant disorder ([facility])    • Moderate exercise     Active but no formal exercise   • BENJA (obstructive sleep apnea)    • Osteoarthritis of left knee 04/22/2016    end-stage   • Osteoporosis     Osteoporosis in the hips/femur no fracture   • Overweight (BMI 25.0-29.9)    • Pedal edema 7/12/2021   • Pulmonary HTN (HCC)    • Severe tricuspid regurgitation 05/2018    ECHO (TTE) Diastolic dysfunction, Pulmonary HTN and  Severe Tricuspid regurgitation   • Tinea unguium 2019     Past Surgical History:   Procedure Laterality Date   • CHOLECYSTECTOMY     • COLONOSCOPY     • CYSTOSCOPY     • HERNIA REPAIR     • OTHER SURGICAL HISTORY      Brachytherapy   • REPLACEMENT TOTAL KNEE Left 05/10/2017   • REPLACEMENT TOTAL KNEE Right    • WRIST SURGERY       Family History   Problem Relation Age of Onset   • Heart disease Mother    • Hypertension Mother    • Breast cancer Daughter         Breast Neoplasm, Malignant   • Cancer Daughter         Unspecified   • Diabetes Daughter         Unspecified type     Social History     Socioeconomic History   • Marital status:    Tobacco Use   • Smoking status: Former Smoker     Packs/day: 2.00     Years: 15.00     Pack years: 30.00     Types: Cigars     Start date: 1949     Quit date: 2009     Years since quittin.5   • Smokeless tobacco: Never Used   Vaping Use   • Vaping Use: Never used   Substance and Sexual Activity   • Alcohol use: Never   • Drug use: Never   • Sexual activity: Not Currently     Partners: Female     Birth control/protection: None     No Known Allergies  Current Outpatient Medications   Medication Sig Dispense Refill   • ALBUTEROL IN As Needed.     • amLODIPine (NORVASC) 5 MG tablet Take 5 mg by mouth Daily.     • CBD (cannabidiol) oral oil Take  by mouth.     • cetirizine (zyrTEC) 10 MG tablet Take 1 tablet by mouth Daily. Indications: Hayfever 90 tablet 3   • Cholecalciferol 50 MCG (2000 UT) capsule Vitamin D3 2,000 unit oral capsule take 1 capsule by oral route daily   Suspended     • DHA-EPA-FLAXSEED OIL-VITAMIN E PO Take  by mouth.     • docusate sodium (COLACE) 100 MG capsule Take 1 capsule by mouth 2 (Two) Times a Day. Indications: Constipation 180 capsule 3   • Eliquis 2.5 MG tablet tablet Take 2.5 mg by mouth 2 (two) times a day.     • fluticasone (Flonase) 50 MCG/ACT nasal spray into the nostril(s) as directed by provider.     • ipratropium  (ATROVENT) 0.06 % nasal spray 2 sprays into the nostril(s) as directed by provider 4 (Four) Times a Day. 15 mL 5   • metoprolol succinate XL (TOPROL-XL) 50 MG 24 hr tablet Take 1 tablet by mouth Daily. 90 tablet 3   • Mirabegron ER (Myrbetriq) 25 MG tablet sustained-release 24 hour 24 hr tablet Myrbetriq 25 mg oral tablet extended release 24 hr take 1 tablet (25 mg) by oral route once daily swallowing whole with water. Do not crush, chew and/or divide. for 90 days 3/2/2021  Active     • multivitamin with minerals (MULTIVITAMIN ADULTS PO) multivitamin oral tablet take 1 tablet by oral route daily   Suspended     • pantoprazole (PROTONIX) 40 MG EC tablet      • Restasis MultiDose 0.05 % ophthalmic emulsion Daily.     • spironolactone (ALDACTONE) 25 MG tablet Take 1 tablet by mouth Every Other Day. 45 tablet 3   • tamsulosin (FLOMAX) 0.4 MG capsule 24 hr capsule TAKE 1 CAPSULE DAILY 1/2 HOUR FOLLOWING THE SAME MEAL EACH DAY 90 capsule 3   • telmisartan (MICARDIS) 80 MG tablet Take 1 tablet by mouth Daily. 90 tablet 3   • torsemide (Demadex) 20 MG tablet Take 1.5 tablets by mouth Daily. 1.5 tab daily except  90 tablet 3     No current facility-administered medications for this visit.     Review of Systems   Constitutional: Negative.    Skin:        Painful toenails.   All other systems reviewed and are negative.      OBJECTIVE     Vitals:    22 0921   BP: 145/89   Pulse: 55   Temp: 96.9 °F (36.1 °C)   SpO2: 95%       Patient seen in no apparent distress.      PHYSICAL EXAM:     Foot/Ankle Exam:       General:   Appearance: elderly    Orientation: AAOx3    Affect: appropriate    Gait: antalgic    Assistance: cane    Shoe Gear:  Casual shoes    VASCULAR      Right Foot Vascularity   Normal vascular exam    Dorsalis pedis:  1+  Posterior tibial:  1+  Skin Temperature: cool    Edema Gradin+ and pitting  CFT:  < 3 seconds  Pedal Hair Growth:  Present  Varicosities: mild varicosities       Left Foot  Vascularity   Normal vascular exam    Dorsalis pedis:  2+  Posterior tibial:  1+  Skin Temperature: cool    Edema Gradin+ and pitting  CFT:  < 3 seconds  Pedal Hair Growth:  Present  Varicosities: mild varicosities        NEUROLOGIC     Right Foot Neurologic   Normal sensation    Light touch sensation:  Normal  Vibratory sensation:  Normal  Hot/Cold sensation: normal    Protective Sensation using [place] Monofilament:  10     Left Foot Neurologic   Normal sensation    Light touch sensation:  Normal  Vibratory sensation:  Normal  Hot/cold sensation: normal    Protective Sensation using [place] Monofilament:  10     MUSCLE STRENGTH     Right Foot Muscle Strength   Normal strength    Foot dorsiflexion:  5  Foot plantar flexion:  5  Foot inversion:  5  Foot eversion:  5     Left Foot Muscle Strength   Normal strength    Foot dorsiflexion:  5  Foot plantar flexion:  5  Foot inversion:  5  Foot eversion:  5     RANGE OF MOTION      Right Foot Range of Motion   Foot and ankle ROM within normal limits       Left Foot Range of Motion   Foot and ankle ROM within normal limits       DERMATOLOGIC     Right Foot Dermatologic   Skin: skin intact    Nails: onychomycosis, abnormally thick, subungual debris, dystrophic nails and ingrown toenail    Nails comment:  Toenails 1, 2, 3, 4, and 5     Left Foot Dermatologic   Skin: skin intact    Nails: onychomycosis, abnormally thick, subungual debris, dystrophic nails and ingrown toenail    Nails comment:  Toenails 1, 2, 3, 4, and 5      ASSESSMENT/PLAN     Diagnoses and all orders for this visit:    1. Foot pain, bilateral (Primary)    2. Onychomycosis    3. Onychocryptosis    4. Difficulty walking        Comprehensive lower extremity examination and evaluation was performed.    Discussed findings and treatment plan including risks, benefits, and treatment options with patient in detail. Patient agreed with treatment plan.    Toenails 1 through 5 bilaterally were  debrided in thickness and length and then smoothed with a Dremel Tool.  Tolerated the procedure well without complications.    An After Visit Summary was printed and given to the patient at discharge, including (if requested) any available informative/educational handouts regarding diagnosis, treatment, or medications. All questions were answered to patient/family satisfaction. Should symptoms fail to improve or worsen they agree to call or return to clinic or to go to the Emergency Department. Discussed the importance of following up with any needed screening tests/labs/specialist appointments and any requested follow-up recommended by me today. Importance of maintaining follow-up discussed and patient accepts that missed appointments can delay diagnosis and potentially lead to worsening of conditions.    Return in about 9 weeks (around 5/6/2022) for Toenail Care., or sooner if acute issues arise.    This document has been electronically signed by Enzo Ferrell DPM on March 4, 2022 09:37 EST

## 2022-03-10 ENCOUNTER — HOSPITAL ENCOUNTER (OUTPATIENT)
Dept: INFUSION THERAPY | Facility: HOSPITAL | Age: 87
Discharge: HOME | End: 2022-03-10
Admitting: INTERNAL MEDICINE

## 2022-03-10 VITALS
BODY MASS INDEX: 28.85 KG/M2 | RESPIRATION RATE: 20 BRPM | DIASTOLIC BLOOD PRESSURE: 53 MMHG | HEIGHT: 72 IN | HEART RATE: 79 BPM | WEIGHT: 212.96 LBS | TEMPERATURE: 97.7 F | OXYGEN SATURATION: 97 % | SYSTOLIC BLOOD PRESSURE: 158 MMHG

## 2022-03-10 DIAGNOSIS — M81.0 SENILE OSTEOPOROSIS: Primary | ICD-10-CM

## 2022-03-10 PROCEDURE — 25010000002 DENOSUMAB 60 MG/ML SOLUTION PREFILLED SYRINGE: Performed by: INTERNAL MEDICINE

## 2022-03-10 PROCEDURE — 96372 THER/PROPH/DIAG INJ SC/IM: CPT

## 2022-03-10 RX ADMIN — DENOSUMAB 60 MG: 60 INJECTION SUBCUTANEOUS at 08:01

## 2022-04-15 RX ORDER — PANTOPRAZOLE SODIUM 40 MG/1
40 TABLET, DELAYED RELEASE ORAL DAILY
Qty: 90 TABLET | Refills: 1 | Status: SHIPPED | OUTPATIENT
Start: 2022-04-15 | End: 2022-09-28 | Stop reason: SDUPTHER

## 2022-04-15 RX ORDER — APIXABAN 2.5 MG/1
2.5 TABLET, FILM COATED ORAL 2 TIMES DAILY
Qty: 180 TABLET | Refills: 1 | Status: SHIPPED | OUTPATIENT
Start: 2022-04-15 | End: 2022-06-20 | Stop reason: SDUPTHER

## 2022-04-15 RX ORDER — AMLODIPINE BESYLATE 5 MG/1
5 TABLET ORAL DAILY
Qty: 90 TABLET | Refills: 1 | Status: SHIPPED | OUTPATIENT
Start: 2022-04-15 | End: 2022-09-28 | Stop reason: SDUPTHER

## 2022-04-19 ENCOUNTER — TELEPHONE (OUTPATIENT)
Dept: FAMILY MEDICINE CLINIC | Facility: CLINIC | Age: 87
End: 2022-04-19

## 2022-04-19 DIAGNOSIS — K59.01 SLOW TRANSIT CONSTIPATION: ICD-10-CM

## 2022-04-19 DIAGNOSIS — J30.1 SEASONAL ALLERGIC RHINITIS DUE TO POLLEN: ICD-10-CM

## 2022-04-19 RX ORDER — DOCUSATE SODIUM 100 MG/1
100 CAPSULE, LIQUID FILLED ORAL 2 TIMES DAILY
Qty: 180 CAPSULE | Refills: 3 | Status: SHIPPED | OUTPATIENT
Start: 2022-04-19

## 2022-04-19 RX ORDER — CETIRIZINE HYDROCHLORIDE 10 MG/1
10 TABLET ORAL DAILY
Qty: 90 TABLET | Refills: 3 | Status: SHIPPED | OUTPATIENT
Start: 2022-04-19

## 2022-04-19 NOTE — TELEPHONE ENCOUNTER
Tanisha Carter called in wanting refills for her father's medications called into [facility] the medications are     CETIRIZINE 10 MG tablet take once daily    DOCUSATE SODIUM 100 MG tablet take 2 times daily     Both prescriptions are to be 90 day supplies

## 2022-05-20 ENCOUNTER — OFFICE VISIT (OUTPATIENT)
Dept: PODIATRY | Facility: CLINIC | Age: 87
End: 2022-05-20

## 2022-05-20 VITALS
WEIGHT: 205 LBS | HEART RATE: 64 BPM | OXYGEN SATURATION: 96 % | TEMPERATURE: 94.9 F | BODY MASS INDEX: 27.77 KG/M2 | DIASTOLIC BLOOD PRESSURE: 49 MMHG | HEIGHT: 72 IN | SYSTOLIC BLOOD PRESSURE: 151 MMHG

## 2022-05-20 DIAGNOSIS — L60.0 ONYCHOCRYPTOSIS: Primary | ICD-10-CM

## 2022-05-20 DIAGNOSIS — R26.2 DIFFICULTY WALKING: ICD-10-CM

## 2022-05-20 DIAGNOSIS — M79.672 FOOT PAIN, BILATERAL: ICD-10-CM

## 2022-05-20 DIAGNOSIS — B35.1 ONYCHOMYCOSIS: ICD-10-CM

## 2022-05-20 DIAGNOSIS — M79.671 FOOT PAIN, BILATERAL: ICD-10-CM

## 2022-05-20 PROCEDURE — 11721 DEBRIDE NAIL 6 OR MORE: CPT | Performed by: PODIATRIST

## 2022-05-20 NOTE — PROGRESS NOTES
[facility]    Today's Date: 05/20/22    Patient Name: Vladimir Carter  MRN: [id number]  CSN: [id number]  PCP: Mango Kumar DO, Last PCP Visit: 4/19/2022  Referring Provider: No ref. provider found    SUBJECTIVE     Chief Complaint   Patient presents with   • Left Foot - Nail Problem   • Right Foot - Nail Problem     HPI: Vladimir Carter, a 89 y.o.male, comes to clinic.    New, Established, New Problem:  established   Location:  Toenails  Duration:   Greater than five years  Onset:  Gradual  Nature:  sore with palpation.  Stable, worsening, improving:   Stable  Aggravating factors:  Pain with shoe gear and ambulation.  Previous Treatment:  debridement    Patient reports the following medical changes since their last visit: none    No other pedal complaints at this time.    Patient denies any fevers, chills, nausea, vomiting, shortness of breathe, nor any other constitutional signs nor symptoms.         Past Medical History:   Diagnosis Date   • Abnormal bone density screening 09/29/2019    Spine-Osteopenia Hps-Osteoporosis   • Benign non-nodular prostatic hyperplasia with lower urinary tract symptoms 10/29/2018   • Bunion    • CAD (coronary artery disease)    • Callus    • Chronic pain syndrome    • CKD (chronic kidney disease) stage 3, GFR 30-59 ml/min ([facility])    • GERD without esophagitis 09/16/2016   • H/O prostate cancer    • HTN (hypertension)    • Ingrown toenail    • Long term (current) use of anticoagulants    • Lupus anticoagulant disorder ([facility])    • Moderate exercise     Active but no formal exercise   • BENJA (obstructive sleep apnea)    • Osteoarthritis of left knee 04/22/2016    end-stage   • Osteoporosis     Osteoporosis in the hips/femur no fracture   • Overweight (BMI 25.0-29.9)    • Pedal edema 7/12/2021   • Pulmonary HTN (HCC)    • Severe tricuspid regurgitation 05/2018    ECHO (TTE) Diastolic dysfunction, Pulmonary HTN and Severe Tricuspid regurgitation   • Tinea unguium  2019     Past Surgical History:   Procedure Laterality Date   • CHOLECYSTECTOMY     • COLONOSCOPY     • CYSTOSCOPY     • HERNIA REPAIR     • OTHER SURGICAL HISTORY      Brachytherapy   • REPLACEMENT TOTAL KNEE Left 05/10/2017   • REPLACEMENT TOTAL KNEE Right    • WRIST SURGERY       Family History   Problem Relation Age of Onset   • Heart disease Mother    • Hypertension Mother    • Breast cancer Daughter         Breast Neoplasm, Malignant   • Cancer Daughter         Unspecified   • Diabetes Daughter         Unspecified type     Social History     Socioeconomic History   • Marital status:    Tobacco Use   • Smoking status: Former Smoker     Packs/day: 2.00     Years: 15.00     Pack years: 30.00     Types: Cigars     Start date: 1949     Quit date: 2009     Years since quittin.7   • Smokeless tobacco: Never Used   Vaping Use   • Vaping Use: Never used   Substance and Sexual Activity   • Alcohol use: Never   • Drug use: Never   • Sexual activity: Not Currently     Partners: Female     Birth control/protection: None     No Known Allergies  Current Outpatient Medications   Medication Sig Dispense Refill   • ALBUTEROL IN As Needed.     • amLODIPine (NORVASC) 5 MG tablet Take 1 tablet by mouth Daily. 90 tablet 1   • CBD (cannabidiol) oral oil Take  by mouth.     • cetirizine (zyrTEC) 10 MG tablet Take 1 tablet by mouth Daily. Indications: Hayfever 90 tablet 3   • Cholecalciferol 50 MCG (2000 UT) capsule Vitamin D3 2,000 unit oral capsule take 1 capsule by oral route daily   Suspended     • DHA-EPA-FLAXSEED OIL-VITAMIN E PO Take  by mouth.     • docusate sodium (COLACE) 100 MG capsule Take 1 capsule by mouth 2 (Two) Times a Day. Indications: Constipation 180 capsule 3   • Eliquis 2.5 MG tablet tablet Take 1 tablet by mouth 2 (Two) Times a Day. 180 tablet 1   • fluticasone (FLONASE) 50 MCG/ACT nasal spray into the nostril(s) as directed by provider.     • ipratropium (ATROVENT) 0.06 % nasal spray  2 sprays into the nostril(s) as directed by provider 4 (Four) Times a Day. 15 mL 5   • metoprolol succinate XL (TOPROL-XL) 50 MG 24 hr tablet Take 1 tablet by mouth Daily. 90 tablet 3   • Mirabegron ER (MYRBETRIQ) 25 MG tablet sustained-release 24 hour 24 hr tablet Myrbetriq 25 mg oral tablet extended release 24 hr take 1 tablet (25 mg) by oral route once daily swallowing whole with water. Do not crush, chew and/or divide. for 90 days 3/2/2021  Active     • multivitamin with minerals tablet tablet multivitamin oral tablet take 1 tablet by oral route daily   Suspended     • pantoprazole (PROTONIX) 40 MG EC tablet Take 1 tablet by mouth Daily. 90 tablet 1   • Restasis MultiDose 0.05 % ophthalmic emulsion Daily.     • spironolactone (ALDACTONE) 25 MG tablet Take 1 tablet by mouth Every Other Day. 45 tablet 3   • tamsulosin (FLOMAX) 0.4 MG capsule 24 hr capsule TAKE 1 CAPSULE DAILY 1/2 HOUR FOLLOWING THE SAME MEAL EACH DAY 90 capsule 3   • telmisartan (MICARDIS) 80 MG tablet Take 1 tablet by mouth Daily. 90 tablet 3   • torsemide (Demadex) 20 MG tablet Take 1.5 tablets by mouth Daily. 1.5 tab daily except  90 tablet 3     No current facility-administered medications for this visit.     Review of Systems   Constitutional: Negative.    Skin:        Painful toenails.   All other systems reviewed and are negative.      OBJECTIVE     Vitals:    22 0914   BP: 151/49   Pulse: 64   Temp: 94.9 °F (34.9 °C)   SpO2: 96%       Patient seen in no apparent distress.      PHYSICAL EXAM:     Foot/Ankle Exam:       General:   Appearance: elderly    Orientation: AAOx3    Affect: appropriate    Gait: antalgic    Assistance: cane    Shoe Gear:  Casual shoes    VASCULAR      Right Foot Vascularity   Normal vascular exam    Dorsalis pedis:  1+  Posterior tibial:  1+  Skin Temperature: cool    Edema Gradin+ and pitting  CFT:  < 3 seconds  Pedal Hair Growth:  Present  Varicosities: mild varicosities       Left Foot Vascularity    Normal vascular exam    Dorsalis pedis:  2+  Posterior tibial:  1+  Skin Temperature: cool    Edema Gradin+ and pitting  CFT:  < 3 seconds  Pedal Hair Growth:  Present  Varicosities: mild varicosities        NEUROLOGIC     Right Foot Neurologic   Normal sensation    Light touch sensation:  Normal  Vibratory sensation:  Normal  Hot/Cold sensation: normal    Protective Sensation using [place] Monofilament:  10     Left Foot Neurologic   Normal sensation    Light touch sensation:  Normal  Vibratory sensation:  Normal  Hot/cold sensation: normal    Protective Sensation using [place] Monofilament:  10     MUSCLE STRENGTH     Right Foot Muscle Strength   Normal strength    Foot dorsiflexion:  5  Foot plantar flexion:  5  Foot inversion:  5  Foot eversion:  5     Left Foot Muscle Strength   Normal strength    Foot dorsiflexion:  5  Foot plantar flexion:  5  Foot inversion:  5  Foot eversion:  5     RANGE OF MOTION      Right Foot Range of Motion   Foot and ankle ROM within normal limits       Left Foot Range of Motion   Foot and ankle ROM within normal limits       DERMATOLOGIC     Right Foot Dermatologic   Skin: skin intact    Nails: onychomycosis, abnormally thick, subungual debris, dystrophic nails and ingrown toenail    Nails comment:  Toenails 1, 2, 3, 4, and 5     Left Foot Dermatologic   Skin: skin intact    Nails: onychomycosis, abnormally thick, subungual debris, dystrophic nails and ingrown toenail    Nails comment:  Toenails 1, 2, 3, 4, and 5      ASSESSMENT/PLAN     Diagnoses and all orders for this visit:    1. Onychocryptosis (Primary)    2. Foot pain, bilateral    3. Onychomycosis    4. Difficulty walking        Comprehensive lower extremity examination and evaluation was performed.    Discussed findings and treatment plan including risks, benefits, and treatment options with patient in detail. Patient agreed with treatment plan.    Toenails 1 through 5 bilaterally were debrided in  thickness and length and then smoothed with a Dremel Tool.  Tolerated the procedure well without complications.    An After Visit Summary was printed and given to the patient at discharge, including (if requested) any available informative/educational handouts regarding diagnosis, treatment, or medications. All questions were answered to patient/family satisfaction. Should symptoms fail to improve or worsen they agree to call or return to clinic or to go to the Emergency Department. Discussed the importance of following up with any needed screening tests/labs/specialist appointments and any requested follow-up recommended by me today. Importance of maintaining follow-up discussed and patient accepts that missed appointments can delay diagnosis and potentially lead to worsening of conditions.    Return in about 9 weeks (around 7/22/2022) for Toenail Care., or sooner if acute issues arise.    This document has been electronically signed by Enzo Ferrell DPM on May 20, 2022 09:45 EDT

## 2022-06-02 ENCOUNTER — TELEPHONE (OUTPATIENT)
Dept: UROLOGY | Facility: CLINIC | Age: 87
End: 2022-06-02

## 2022-06-02 DIAGNOSIS — N32.81 OAB (OVERACTIVE BLADDER): Primary | ICD-10-CM

## 2022-06-02 NOTE — TELEPHONE ENCOUNTER
Patient's daughter called and asked for refills on Myrbetriq 25 mg to go to [facility].  She said they faxed a request on 05/09/22, and asked if our office received it.  [facility] is sending request again.

## 2022-06-14 ENCOUNTER — LAB (OUTPATIENT)
Dept: LAB | Facility: HOSPITAL | Age: 87
End: 2022-06-14

## 2022-06-14 DIAGNOSIS — N18.32 STAGE 3B CHRONIC KIDNEY DISEASE: ICD-10-CM

## 2022-06-14 DIAGNOSIS — I25.10 MILD CAD: ICD-10-CM

## 2022-06-14 DIAGNOSIS — E78.5 HYPERLIPIDEMIA LDL GOAL <100: ICD-10-CM

## 2022-06-14 DIAGNOSIS — I10 ESSENTIAL HYPERTENSION: ICD-10-CM

## 2022-06-14 LAB
ALBUMIN SERPL-MCNC: 3.9 G/DL (ref 3.5–5.2)
ALBUMIN/GLOB SERPL: 1.5 G/DL
ALP SERPL-CCNC: 62 U/L (ref 39–117)
ALT SERPL W P-5'-P-CCNC: 9 U/L (ref 1–41)
ANION GAP SERPL CALCULATED.3IONS-SCNC: 10.7 MMOL/L (ref 5–15)
AST SERPL-CCNC: 16 U/L (ref 1–40)
BILIRUB SERPL-MCNC: 0.4 MG/DL (ref 0–1.2)
BUN SERPL-MCNC: 32 MG/DL (ref 8–23)
BUN/CREAT SERPL: 20 (ref 7–25)
CALCIUM SPEC-SCNC: 9.1 MG/DL (ref 8.2–9.6)
CHLORIDE SERPL-SCNC: 104 MMOL/L (ref 98–107)
CHOLEST SERPL-MCNC: 131 MG/DL (ref 0–200)
CO2 SERPL-SCNC: 27.3 MMOL/L (ref 22–29)
CREAT SERPL-MCNC: 1.6 MG/DL (ref 0.76–1.27)
EGFRCR SERPLBLD CKD-EPI 2021: 39.7 ML/MIN/1.73
GLOBULIN UR ELPH-MCNC: 2.6 GM/DL
GLUCOSE SERPL-MCNC: 107 MG/DL (ref 65–99)
HDLC SERPL-MCNC: 41 MG/DL (ref 40–60)
LDLC SERPL CALC-MCNC: 76 MG/DL (ref 0–100)
LDLC/HDLC SERPL: 1.85 {RATIO}
MAGNESIUM SERPL-MCNC: 2 MG/DL (ref 1.7–2.3)
POTASSIUM SERPL-SCNC: 4.2 MMOL/L (ref 3.5–5.2)
PROT SERPL-MCNC: 6.5 G/DL (ref 6–8.5)
SODIUM SERPL-SCNC: 142 MMOL/L (ref 136–145)
TRIGL SERPL-MCNC: 71 MG/DL (ref 0–150)
VLDLC SERPL-MCNC: 14 MG/DL (ref 5–40)

## 2022-06-14 PROCEDURE — 80053 COMPREHEN METABOLIC PANEL: CPT

## 2022-06-14 PROCEDURE — 36415 COLL VENOUS BLD VENIPUNCTURE: CPT

## 2022-06-14 PROCEDURE — 80061 LIPID PANEL: CPT

## 2022-06-14 PROCEDURE — 83735 ASSAY OF MAGNESIUM: CPT

## 2022-06-20 ENCOUNTER — OFFICE VISIT (OUTPATIENT)
Dept: CARDIOLOGY | Facility: CLINIC | Age: 87
End: 2022-06-20

## 2022-06-20 VITALS
WEIGHT: 212.8 LBS | SYSTOLIC BLOOD PRESSURE: 145 MMHG | DIASTOLIC BLOOD PRESSURE: 51 MMHG | HEIGHT: 72 IN | BODY MASS INDEX: 28.82 KG/M2 | HEART RATE: 61 BPM

## 2022-06-20 DIAGNOSIS — N17.9 ACUTE RENAL FAILURE SUPERIMPOSED ON STAGE 3B CHRONIC KIDNEY DISEASE, UNSPECIFIED ACUTE RENAL FAILURE TYPE: ICD-10-CM

## 2022-06-20 DIAGNOSIS — I38 HEART FAILURE DUE TO VALVULAR DISEASE, CHRONIC, DIASTOLIC: Primary | ICD-10-CM

## 2022-06-20 DIAGNOSIS — N18.32 ACUTE RENAL FAILURE SUPERIMPOSED ON STAGE 3B CHRONIC KIDNEY DISEASE, UNSPECIFIED ACUTE RENAL FAILURE TYPE: ICD-10-CM

## 2022-06-20 DIAGNOSIS — I50.32 HEART FAILURE DUE TO VALVULAR DISEASE, CHRONIC, DIASTOLIC: Primary | ICD-10-CM

## 2022-06-20 DIAGNOSIS — I10 ESSENTIAL HYPERTENSION: ICD-10-CM

## 2022-06-20 DIAGNOSIS — E78.5 HYPERLIPIDEMIA LDL GOAL <100: Chronic | ICD-10-CM

## 2022-06-20 PROCEDURE — 99214 OFFICE O/P EST MOD 30 MIN: CPT | Performed by: INTERNAL MEDICINE

## 2022-06-20 RX ORDER — APIXABAN 2.5 MG/1
2.5 TABLET, FILM COATED ORAL 2 TIMES DAILY
Qty: 180 TABLET | Refills: 1 | Status: SHIPPED | OUTPATIENT
Start: 2022-06-20 | End: 2022-09-28 | Stop reason: SDUPTHER

## 2022-06-20 RX ORDER — TELMISARTAN 80 MG/1
80 TABLET ORAL DAILY
Qty: 90 TABLET | Refills: 3 | Status: SHIPPED | OUTPATIENT
Start: 2022-06-20

## 2022-06-20 NOTE — ASSESSMENT & PLAN NOTE
He has chronic kidney disease but this is got worse over the last month or 2.  We will have him reduce his spironolactone to every other day and get a BMP in 2 weeks

## 2022-06-20 NOTE — PROGRESS NOTES
Office Visit    Chief Complaint  Hypertension and Leg Swelling    Subjective            Vladimir Carter presents to [facility] CARDIOLOGY  Mr. Gooden is a 89 years old gentleman with chronic diastolic heart failure hypertension hyperlipidemia who is doing reasonably well.  He denies any chest pain but has mild to moderate shortness of on moderate exertion.  His pedal edema has improved significantly.      Past Medical History:   Diagnosis Date   • Abnormal bone density screening 09/29/2019    Spine-Osteopenia Hps-Osteoporosis   • Benign non-nodular prostatic hyperplasia with lower urinary tract symptoms 10/29/2018   • Bunion    • CAD (coronary artery disease)    • Callus    • Chronic pain syndrome    • CKD (chronic kidney disease) stage 3, GFR 30-59 ml/min ([facility])    • GERD without esophagitis 09/16/2016   • H/O prostate cancer    • HTN (hypertension)    • Ingrown toenail    • Long term (current) use of anticoagulants    • Lupus anticoagulant disorder (HCC)    • Moderate exercise     Active but no formal exercise   • BENJA (obstructive sleep apnea)    • Osteoarthritis of left knee 04/22/2016    end-stage   • Osteoporosis     Osteoporosis in the hips/femur no fracture   • Overweight (BMI 25.0-29.9)    • Pedal edema 7/12/2021   • Pulmonary HTN (HCC)    • Severe tricuspid regurgitation 05/2018    ECHO (TTE) Diastolic dysfunction, Pulmonary HTN and Severe Tricuspid regurgitation   • Tinea unguium 01/24/2019       No Known Allergies     Past Surgical History:   Procedure Laterality Date   • CHOLECYSTECTOMY     • COLONOSCOPY     • CYSTOSCOPY     • HERNIA REPAIR     • OTHER SURGICAL HISTORY      Brachytherapy   • REPLACEMENT TOTAL KNEE Left 05/10/2017   • REPLACEMENT TOTAL KNEE Right    • WRIST SURGERY          Social History     Tobacco Use   • Smoking status: Former Smoker     Packs/day: 2.00     Years: 15.00     Pack years: 30.00     Types: Cigars     Start date: 8/28/1949     Quit date: 8/28/2009      Years since quittin.8   • Smokeless tobacco: Never Used   Vaping Use   • Vaping Use: Never used   Substance Use Topics   • Alcohol use: Never   • Drug use: Never       Family History   Problem Relation Age of Onset   • Heart disease Mother    • Hypertension Mother    • Breast cancer Daughter         Breast Neoplasm, Malignant   • Cancer Daughter         Unspecified   • Diabetes Daughter         Unspecified type        Prior to Admission medications    Medication Sig Start Date End Date Taking? Authorizing Provider   ALBUTEROL IN As Needed.   Yes Marie Rankin MD   amLODIPine (NORVASC) 5 MG tablet Take 1 tablet by mouth Daily. 4/15/22  Yes Paxton Luis MD   CBD (cannabidiol) oral oil Take  by mouth.   Yes Marie Rankin MD   cetirizine (zyrTEC) 10 MG tablet Take 1 tablet by mouth Daily. Indications: Hayfever 22  Yes Mango Kumar DO   Cholecalciferol 50 MCG (2000 UT) capsule Vitamin D3 2,000 unit oral capsule take 1 capsule by oral route daily   Suspended   Yes ProviderMarie MD   DHA-EPA-FLAXSEED OIL-VITAMIN E PO Take  by mouth.   Yes Marie Rankin MD   docusate sodium (COLACE) 100 MG capsule Take 1 capsule by mouth 2 (Two) Times a Day. Indications: Constipation 22  Yes Mango Kumar DO   Eliquis 2.5 MG tablet tablet Take 1 tablet by mouth 2 (Two) Times a Day. 4/15/22  Yes Paxton Luis MD   fluticasone (FLONASE) 50 MCG/ACT nasal spray into the nostril(s) as directed by provider.   Yes Marie Rankin MD   ipratropium (ATROVENT) 0.06 % nasal spray 2 sprays into the nostril(s) as directed by provider 4 (Four) Times a Day. 22  Yes Mango Kumar DO   metoprolol succinate XL (TOPROL-XL) 50 MG 24 hr tablet Take 1 tablet by mouth Daily. 21  Yes Lucia Moura APRN   Mirabegron ER (MYRBETRIQ) 25 MG tablet sustained-release 24 hour 24 hr tablet Take 1 tablet by mouth Daily. 22  Yes Ermias Tang MD   multivitamin with minerals tablet tablet  "multivitamin oral tablet take 1 tablet by oral route daily   Suspended   Yes Marie Rankin MD   pantoprazole (PROTONIX) 40 MG EC tablet Take 1 tablet by mouth Daily. 4/15/22  Yes Paxton Luis MD   Restasis MultiDose 0.05 % ophthalmic emulsion Daily. 8/6/21  Yes Marie Rankin MD   spironolactone (ALDACTONE) 25 MG tablet Take 1 tablet by mouth Every Other Day. 2/25/22  Yes Paxton Luis MD   tamsulosin (FLOMAX) 0.4 MG capsule 24 hr capsule TAKE 1 CAPSULE DAILY 1/2 HOUR FOLLOWING THE SAME MEAL EACH DAY 1/26/22  Yes Ermias Tang MD   telmisartan (MICARDIS) 80 MG tablet Take 1 tablet by mouth Daily. 7/12/21  Yes Lucia Moura BriannaFIORELLA   torsemide (Demadex) 20 MG tablet Take 1.5 tablets by mouth Daily. 1.5 tab daily except sunday 2/25/22  Yes Paxton Luis MD        Review of Systems   Constitutional: Positive for fatigue.   Respiratory: Positive for shortness of breath. Negative for cough.    Cardiovascular: Negative for chest pain, palpitations and leg swelling.        Objective     /51   Pulse 61   Ht 181.6 cm (71.5\")   Wt 96.5 kg (212 lb 12.8 oz)   BMI 29.27 kg/m²       Physical Exam  Constitutional:       General: He is awake.      Appearance: Normal appearance.   Neck:      Thyroid: No thyromegaly.      Vascular: No carotid bruit or JVD.   Cardiovascular:      Rate and Rhythm: Normal rate. Rhythm irregular.      Chest Wall: PMI is not displaced.      Pulses: Normal pulses.      Heart sounds: S1 normal and S2 normal. Murmur heard.    Systolic murmur is present.    No friction rub. No gallop. No S3 or S4 sounds.   Pulmonary:      Effort: Pulmonary effort is normal.      Breath sounds: Normal breath sounds and air entry. No wheezing, rhonchi or rales.   Abdominal:      General: Bowel sounds are normal.      Palpations: Abdomen is soft. There is no mass.      Tenderness: There is no abdominal tenderness.   Musculoskeletal:      Cervical back: Neck supple.      Right lower leg: No " edema.      Left lower leg: No edema.   Neurological:      Mental Status: He is alert and oriented to person, place, and time.   Psychiatric:         Mood and Affect: Mood normal.         Behavior: Behavior is cooperative.       No results found for: PROBNP, BNP  CMP    CMP 1/14/22 2/28/22 2/28/22 6/14/22     0751 [id number]    Glucose 105 (A)   107 (A)   BUN 22   32 (A)   Creatinine 1.40 (A)  1.29 (A) 1.60 (A)   eGFR Non African Am 47 (A)  52 (A)    Sodium 141   142   Potassium 4.9   4.2   Chloride 104   104   Calcium 9.9 (A) 9.4  9.1   Albumin 4.00   3.90   Total Bilirubin 0.3   0.4   Alkaline Phosphatase 57   62   AST (SGOT) 22   16   ALT (SGPT) 7   9   (A) Abnormal value            CBC w/diff    CBC w/Diff 6/29/21 1/14/22   WBC 7.64 8.15   RBC 4.63 4.96   Hemoglobin 14.8 15.7   Hematocrit 44.7 47.3   MCV 96.5 95.4   MCH 32.0 31.7   MCHC 33.1 33.2   RDW 13.3 13.2   Platelets 182 205   Neutrophil Rel % 53.3 63.3   Immature Granulocyte Rel % 0.3 0.2   Lymphocyte Rel % 30.6 23.2   Monocyte Rel % 12.4 (A) 11.8   Eosinophil Rel % 2.7 0.9   Basophil Rel % 0.7 0.6   (A) Abnormal value             Lipid Panel    Lipid Panel 6/29/21 1/14/22 6/14/22   Total Cholesterol 127 129 131   Triglycerides 55 70 71   HDL Cholesterol 45 46 41   VLDL Cholesterol 12 14 14   LDL Cholesterol  70 69 76   LDL/HDL Ratio 1.58 1.50 1.85            Lab Results   Component Value Date    TSH 2.780 06/29/2021      Lab Results   Component Value Date    FREET4 1.16 06/29/2021      No results found for: DDIMERQUANT  Magnesium   Date Value Ref Range Status   06/14/2022 2.0 1.7 - 2.3 mg/dL Final      No results found for: DIGOXIN   A1C Last 3 Results    HGBA1C Last 3 Results 1/14/22   Hemoglobin A1C 6.59 (A)   (A) Abnormal value                       Result Review :                           Assessment and Plan        Diagnoses and all orders for this visit:    1. Heart failure due to valvular disease, chronic, diastolic (HCC) (Primary)  Assessment &  Plan:  He has symptomatic diastolic heart failure.  In order to determine the severity of his aortic and mitral valve regurgitation we will order an echocardiogram.    Orders:  -     proBNP; Future  -     Basic Metabolic Panel; Future  -     Lipid Panel; Future  -     Comprehensive Metabolic Panel; Future  -     Magnesium; Future  -     US Renal Bilateral; Future  -     Adult Transthoracic Echo Complete W/ Cont if Necessary Per Protocol; Future    2. Essential hypertension  Assessment & Plan:  His blood pressure is well regulated on his current medications which include torsemide telmisartan spironolactone and metoprolol succinate.  He will continue all of these but will change the dose as follows he will reduce the spironolactone 1 tablet every other day.  This is nonlimiting for worsening renal function.  He will get a repeat BMP in 2 weeks    Orders:  -     telmisartan (MICARDIS) 80 MG tablet; Take 1 tablet by mouth Daily.  Dispense: 90 tablet; Refill: 3  -     proBNP; Future  -     Basic Metabolic Panel; Future  -     Lipid Panel; Future  -     Comprehensive Metabolic Panel; Future  -     Magnesium; Future  -     US Renal Bilateral; Future  -     Adult Transthoracic Echo Complete W/ Cont if Necessary Per Protocol; Future    3. Hyperlipidemia LDL goal <100  -     proBNP; Future  -     Basic Metabolic Panel; Future  -     Lipid Panel; Future  -     Comprehensive Metabolic Panel; Future  -     Magnesium; Future  -     US Renal Bilateral; Future  -     Adult Transthoracic Echo Complete W/ Cont if Necessary Per Protocol; Future    4. Acute renal failure superimposed on stage 3b chronic kidney disease, unspecified acute renal failure type (HCC)  Assessment & Plan:  He has chronic kidney disease but this is got worse over the last month or 2.  We will have him reduce his spironolactone to every other day and get a BMP in 2 weeks      Other orders  -     Eliquis 2.5 MG tablet tablet; Take 1 tablet by mouth 2 (Two) Times  a Day.  Dispense: 180 tablet; Refill: 1          Follow Up     Return in about 6 months (around 12/20/2022) for fu with Becky.    Patient was given instructions and counseling regarding his condition or for health maintenance advice. Please see specific information pulled into the AVS if appropriate.     Paxton Luis MD  06/20/22 08:47 EDT

## 2022-06-20 NOTE — ASSESSMENT & PLAN NOTE
He has symptomatic diastolic heart failure.  In order to determine the severity of his aortic and mitral valve regurgitation we will order an echocardiogram.

## 2022-06-20 NOTE — ASSESSMENT & PLAN NOTE
His blood pressure is well regulated on his current medications which include torsemide telmisartan spironolactone and metoprolol succinate.  He will continue all of these but will change the dose as follows he will reduce the spironolactone 1 tablet every other day.  This is nonlimiting for worsening renal function.  He will get a repeat BMP in 2 weeks

## 2022-07-14 ENCOUNTER — APPOINTMENT (OUTPATIENT)
Dept: ULTRASOUND IMAGING | Facility: HOSPITAL | Age: 87
End: 2022-07-14

## 2022-07-18 DIAGNOSIS — R00.1 BRADYCARDIA, SINUS: ICD-10-CM

## 2022-07-18 DIAGNOSIS — I10 ESSENTIAL HYPERTENSION: ICD-10-CM

## 2022-07-18 RX ORDER — METOPROLOL SUCCINATE 50 MG/1
50 TABLET, EXTENDED RELEASE ORAL DAILY
Qty: 90 TABLET | Refills: 3 | Status: SHIPPED | OUTPATIENT
Start: 2022-07-18

## 2022-07-22 ENCOUNTER — HOSPITAL ENCOUNTER (OUTPATIENT)
Dept: ULTRASOUND IMAGING | Facility: HOSPITAL | Age: 87
Discharge: HOME | End: 2022-07-22
Admitting: INTERNAL MEDICINE

## 2022-07-22 DIAGNOSIS — E78.5 HYPERLIPIDEMIA LDL GOAL <100: Chronic | ICD-10-CM

## 2022-07-22 DIAGNOSIS — I50.32 HEART FAILURE DUE TO VALVULAR DISEASE, CHRONIC, DIASTOLIC: ICD-10-CM

## 2022-07-22 DIAGNOSIS — I10 ESSENTIAL HYPERTENSION: ICD-10-CM

## 2022-07-22 DIAGNOSIS — I38 HEART FAILURE DUE TO VALVULAR DISEASE, CHRONIC, DIASTOLIC: ICD-10-CM

## 2022-07-22 PROCEDURE — 76775 US EXAM ABDO BACK WALL LIM: CPT

## 2022-07-26 ENCOUNTER — TELEPHONE (OUTPATIENT)
Dept: CARDIOLOGY | Facility: CLINIC | Age: 87
End: 2022-07-26

## 2022-08-12 ENCOUNTER — OFFICE VISIT (OUTPATIENT)
Dept: PODIATRY | Facility: CLINIC | Age: 87
End: 2022-08-12

## 2022-08-12 VITALS
TEMPERATURE: 96.9 F | SYSTOLIC BLOOD PRESSURE: 148 MMHG | BODY MASS INDEX: 29.26 KG/M2 | HEIGHT: 71 IN | HEART RATE: 72 BPM | WEIGHT: 209 LBS | OXYGEN SATURATION: 96 % | DIASTOLIC BLOOD PRESSURE: 50 MMHG

## 2022-08-12 DIAGNOSIS — B35.1 ONYCHOMYCOSIS: ICD-10-CM

## 2022-08-12 DIAGNOSIS — M79.672 FOOT PAIN, BILATERAL: ICD-10-CM

## 2022-08-12 DIAGNOSIS — R26.2 DIFFICULTY WALKING: ICD-10-CM

## 2022-08-12 DIAGNOSIS — L60.0 ONYCHOCRYPTOSIS: Primary | ICD-10-CM

## 2022-08-12 DIAGNOSIS — M79.671 FOOT PAIN, BILATERAL: ICD-10-CM

## 2022-08-12 PROCEDURE — 11721 DEBRIDE NAIL 6 OR MORE: CPT | Performed by: PODIATRIST

## 2022-08-12 NOTE — PROGRESS NOTES
[facility]    Today's Date: 08/12/22    Patient Name: Vladimir Carter  MRN: [id number]  CSN: [id number]  PCP: Mango Kumar DO, Last PCP Visit: 4/19/2022  Referring Provider: No ref. provider found    SUBJECTIVE     Chief Complaint   Patient presents with   • Left Foot - Nail Problem   • Right Foot - Nail Problem     HPI: Vladimir Carter, a 89 y.o.male, comes to clinic.    New, Established, New Problem:  established   Location:  Toenails  Duration:   Greater than five years  Onset:  Gradual  Nature:  sore with palpation.  Stable, worsening, improving:   Stable  Aggravating factors:  Pain with shoe gear and ambulation.  Previous Treatment:  debridement    No recent medical changes.    Patient denies any fevers, chills, nausea, vomiting, shortness of breathe, nor any other constitutional signs nor symptoms.         Past Medical History:   Diagnosis Date   • Abnormal bone density screening 09/29/2019    Spine-Osteopenia Hps-Osteoporosis   • Benign non-nodular prostatic hyperplasia with lower urinary tract symptoms 10/29/2018   • Bunion    • CAD (coronary artery disease)    • Callus    • Chronic pain syndrome    • CKD (chronic kidney disease) stage 3, GFR 30-59 ml/min ([facility])    • GERD without esophagitis 09/16/2016   • H/O prostate cancer    • HTN (hypertension)    • Ingrown toenail    • Long term (current) use of anticoagulants    • Lupus anticoagulant disorder (HCC)    • Moderate exercise     Active but no formal exercise   • BENJA (obstructive sleep apnea)    • Osteoarthritis of left knee 04/22/2016    end-stage   • Osteoporosis     Osteoporosis in the hips/femur no fracture   • Overweight (BMI 25.0-29.9)    • Pedal edema 7/12/2021   • Pulmonary HTN (HCC)    • Severe tricuspid regurgitation 05/2018    ECHO (TTE) Diastolic dysfunction, Pulmonary HTN and Severe Tricuspid regurgitation   • Tinea unguium 01/24/2019     Past Surgical History:   Procedure Laterality Date   • CHOLECYSTECTOMY     •  COLONOSCOPY     • CYSTOSCOPY     • HERNIA REPAIR     • OTHER SURGICAL HISTORY      Brachytherapy   • REPLACEMENT TOTAL KNEE Left 05/10/2017   • REPLACEMENT TOTAL KNEE Right    • WRIST SURGERY       Family History   Problem Relation Age of Onset   • Heart disease Mother    • Hypertension Mother    • Breast cancer Daughter         Breast Neoplasm, Malignant   • Cancer Daughter         Unspecified   • Diabetes Daughter         Unspecified type     Social History     Socioeconomic History   • Marital status:    Tobacco Use   • Smoking status: Former Smoker     Packs/day: 2.00     Years: 15.00     Pack years: 30.00     Types: Cigars     Start date: 1949     Quit date: 2009     Years since quittin.9   • Smokeless tobacco: Never Used   Vaping Use   • Vaping Use: Never used   Substance and Sexual Activity   • Alcohol use: Never   • Drug use: Never   • Sexual activity: Not Currently     Partners: Female     Birth control/protection: None     No Known Allergies  Current Outpatient Medications   Medication Sig Dispense Refill   • ALBUTEROL IN As Needed.     • amLODIPine (NORVASC) 5 MG tablet Take 1 tablet by mouth Daily. 90 tablet 1   • CBD (cannabidiol) oral oil Take  by mouth.     • cetirizine (zyrTEC) 10 MG tablet Take 1 tablet by mouth Daily. Indications: Hayfever 90 tablet 3   • Cholecalciferol 50 MCG (2000 UT) capsule Vitamin D3 2,000 unit oral capsule take 1 capsule by oral route daily   Suspended     • DHA-EPA-FLAXSEED OIL-VITAMIN E PO Take  by mouth.     • docusate sodium (COLACE) 100 MG capsule Take 1 capsule by mouth 2 (Two) Times a Day. Indications: Constipation 180 capsule 3   • Eliquis 2.5 MG tablet tablet Take 1 tablet by mouth 2 (Two) Times a Day. 180 tablet 1   • fluticasone (FLONASE) 50 MCG/ACT nasal spray into the nostril(s) as directed by provider.     • ipratropium (ATROVENT) 0.06 % nasal spray 2 sprays into the nostril(s) as directed by provider 4 (Four) Times a Day. 15 mL 5   •  metoprolol succinate XL (TOPROL-XL) 50 MG 24 hr tablet Take 1 tablet by mouth Daily. 90 tablet 3   • Mirabegron ER (MYRBETRIQ) 25 MG tablet sustained-release 24 hour 24 hr tablet Take 1 tablet by mouth Daily. 90 tablet 4   • multivitamin with minerals tablet tablet multivitamin oral tablet take 1 tablet by oral route daily   Suspended     • pantoprazole (PROTONIX) 40 MG EC tablet Take 1 tablet by mouth Daily. 90 tablet 1   • Restasis MultiDose 0.05 % ophthalmic emulsion Daily.     • spironolactone (ALDACTONE) 25 MG tablet Take 1 tablet by mouth Every Other Day. 45 tablet 3   • tamsulosin (FLOMAX) 0.4 MG capsule 24 hr capsule TAKE 1 CAPSULE DAILY 1/2 HOUR FOLLOWING THE SAME MEAL EACH DAY 90 capsule 3   • telmisartan (MICARDIS) 80 MG tablet Take 1 tablet by mouth Daily. 90 tablet 3   • torsemide (Demadex) 20 MG tablet Take 1.5 tablets by mouth Daily. 1.5 tab daily except  90 tablet 3     No current facility-administered medications for this visit.     Review of Systems   Constitutional: Negative.    Skin:        Painful toenails.   All other systems reviewed and are negative.      OBJECTIVE     Vitals:    22 1030   BP: 148/50   Pulse: 72   Temp: 96.9 °F (36.1 °C)   SpO2: 96%       Patient seen in no apparent distress.      PHYSICAL EXAM:     Foot/Ankle Exam:       General:   Appearance: elderly    Orientation: AAOx3    Affect: appropriate    Gait: antalgic    Assistance: cane    Shoe Gear:  Casual shoes    VASCULAR      Right Foot Vascularity   Normal vascular exam    Dorsalis pedis:  1+  Posterior tibial:  1+  Skin Temperature: cool    Edema Gradin+ and pitting  CFT:  < 3 seconds  Pedal Hair Growth:  Present  Varicosities: mild varicosities       Left Foot Vascularity   Normal vascular exam    Dorsalis pedis:  2+  Posterior tibial:  1+  Skin Temperature: cool    Edema Gradin+ and pitting  CFT:  < 3 seconds  Pedal Hair Growth:  Present  Varicosities: mild varicosities        NEUROLOGIC     Right  Foot Neurologic   Normal sensation    Light touch sensation:  Normal  Vibratory sensation:  Normal  Hot/Cold sensation: normal    Protective Sensation using [place] Monofilament:  10     Left Foot Neurologic   Normal sensation    Light touch sensation:  Normal  Vibratory sensation:  Normal  Hot/cold sensation: normal    Protective Sensation using [place] Monofilament:  10     MUSCLE STRENGTH     Right Foot Muscle Strength   Foot dorsiflexion:  4+  Foot plantar flexion:  4+  Foot inversion:  4+  Foot eversion:  4+     Left Foot Muscle Strength   Foot dorsiflexion:  4+  Foot plantar flexion:  4+  Foot inversion:  4+  Foot eversion:  4+     RANGE OF MOTION      Right Foot Range of Motion   Foot and ankle ROM within normal limits       Left Foot Range of Motion   Foot and ankle ROM within normal limits       DERMATOLOGIC     Right Foot Dermatologic   Skin: skin intact    Nails: onychomycosis, abnormally thick, subungual debris, dystrophic nails and ingrown toenail    Nails comment:  Toenails 1, 2, 3, 4, and 5     Left Foot Dermatologic   Skin: skin intact    Nails: onychomycosis, abnormally thick, subungual debris, dystrophic nails and ingrown toenail    Nails comment:  Toenails 1, 2, 3, 4, and 5      ASSESSMENT/PLAN     Diagnoses and all orders for this visit:    1. Onychocryptosis (Primary)    2. Difficulty walking    3. Foot pain, bilateral    4. Onychomycosis        Comprehensive lower extremity examination and evaluation was performed.    Discussed findings and treatment plan including risks, benefits, and treatment options with patient in detail. Patient agreed with treatment plan.    Toenails 1 through 5 bilaterally were debrided in thickness and length and then smoothed with a Dremel Tool.  Tolerated the procedure well without complications.    An After Visit Summary was printed and given to the patient at discharge, including (if requested) any available informative/educational handouts  regarding diagnosis, treatment, or medications. All questions were answered to patient/family satisfaction. Should symptoms fail to improve or worsen they agree to call or return to clinic or to go to the Emergency Department. Discussed the importance of following up with any needed screening tests/labs/specialist appointments and any requested follow-up recommended by me today. Importance of maintaining follow-up discussed and patient accepts that missed appointments can delay diagnosis and potentially lead to worsening of conditions.    Return in about 9 weeks (around 10/14/2022) for Toenail Care., or sooner if acute issues arise.    This document has been electronically signed by Enzo Ferrell DPM on August 12, 2022 10:53 EDT

## 2022-09-28 ENCOUNTER — TELEPHONE (OUTPATIENT)
Dept: FAMILY MEDICINE CLINIC | Facility: CLINIC | Age: 87
End: 2022-09-28

## 2022-09-28 RX ORDER — PANTOPRAZOLE SODIUM 40 MG/1
40 TABLET, DELAYED RELEASE ORAL DAILY
Qty: 90 TABLET | Refills: 1 | Status: SHIPPED | OUTPATIENT
Start: 2022-09-28

## 2022-09-28 RX ORDER — APIXABAN 2.5 MG/1
2.5 TABLET, FILM COATED ORAL 2 TIMES DAILY
Qty: 180 TABLET | Refills: 1 | Status: SHIPPED | OUTPATIENT
Start: 2022-09-28 | End: 2023-02-03 | Stop reason: SDUPTHER

## 2022-09-28 RX ORDER — AMLODIPINE BESYLATE 5 MG/1
5 TABLET ORAL DAILY
Qty: 90 TABLET | Refills: 1 | Status: SHIPPED | OUTPATIENT
Start: 2022-09-28 | End: 2023-02-03 | Stop reason: SDUPTHER

## 2022-09-28 NOTE — TELEPHONE ENCOUNTER
Caller: TORI OLIVER    Relationship: Emergency Contact    Best call back number: [phone]    Requested Prescriptions:   Requested Prescriptions     Pending Prescriptions Disp Refills   • pantoprazole (PROTONIX) 40 MG EC tablet 90 tablet 1     Sig: Take 1 tablet by mouth Daily.        Pharmacy where request should be sent: [facility] -  [place] - [address] - [phone]  - [phone] FX       Does the patient have less than a 3 day supply:  [x] Yes  [] No    Ric Gaytan Rep   09/28/22 10:48 EDT

## 2022-11-11 ENCOUNTER — OFFICE VISIT (OUTPATIENT)
Dept: PODIATRY | Facility: CLINIC | Age: 87
End: 2022-11-11

## 2022-11-11 VITALS
HEART RATE: 58 BPM | DIASTOLIC BLOOD PRESSURE: 53 MMHG | TEMPERATURE: 96 F | SYSTOLIC BLOOD PRESSURE: 142 MMHG | OXYGEN SATURATION: 100 % | WEIGHT: 205 LBS | BODY MASS INDEX: 28.7 KG/M2 | HEIGHT: 71 IN

## 2022-11-11 DIAGNOSIS — R26.2 DIFFICULTY WALKING: ICD-10-CM

## 2022-11-11 DIAGNOSIS — M79.671 FOOT PAIN, BILATERAL: ICD-10-CM

## 2022-11-11 DIAGNOSIS — L60.0 ONYCHOCRYPTOSIS: ICD-10-CM

## 2022-11-11 DIAGNOSIS — B35.1 ONYCHOMYCOSIS: Primary | ICD-10-CM

## 2022-11-11 DIAGNOSIS — M79.672 FOOT PAIN, BILATERAL: ICD-10-CM

## 2022-11-11 PROCEDURE — 11721 DEBRIDE NAIL 6 OR MORE: CPT | Performed by: PODIATRIST

## 2022-11-11 RX ORDER — CARBOXYMETHYLCELLULOSE SODIUM 10 MG/ML
GEL OPHTHALMIC
COMMUNITY
Start: 2022-10-14

## 2022-11-11 NOTE — PROGRESS NOTES
[facility] - PODIATRY    Today's Date: 11/11/22    Patient Name: Vladimir Carter  MRN: [id number]  CSN: [id number]  PCP: Mango Kumar DO, Last PCP Visit: 9/28/2022  Referring Provider: No ref. provider found    SUBJECTIVE     Chief Complaint   Patient presents with   • Left Foot - Follow-up, Nail Problem   • Right Foot - Follow-up, Nail Problem     HPI: Vladimir Carter, a 89 y.o.male, comes to clinic.    New, Established, New Problem:  established   Location:  Toenails  Duration:   Greater than five years  Onset:  Gradual  Nature:  sore with palpation.  Stable, worsening, improving:   Stable  Aggravating factors:  Pain with shoe gear and ambulation.  Previous Treatment:  debridement    Medical changes:  none.    Patient denies any fevers, chills, nausea, vomiting, shortness of breathe, nor any other constitutional signs nor symptoms.         Past Medical History:   Diagnosis Date   • Abnormal bone density screening 09/29/2019    Spine-Osteopenia Hps-Osteoporosis   • Benign non-nodular prostatic hyperplasia with lower urinary tract symptoms 10/29/2018   • Bunion    • CAD (coronary artery disease)    • Callus    • Chronic pain syndrome    • CKD (chronic kidney disease) stage 3, GFR 30-59 ml/min ([facility])    • GERD without esophagitis 09/16/2016   • H/O prostate cancer    • HTN (hypertension)    • Ingrown toenail    • Long term (current) use of anticoagulants    • Lupus anticoagulant disorder (HCC)    • Moderate exercise     Active but no formal exercise   • BENJA (obstructive sleep apnea)    • Osteoarthritis of left knee 04/22/2016    end-stage   • Osteoporosis     Osteoporosis in the hips/femur no fracture   • Overweight (BMI 25.0-29.9)    • Pedal edema 7/12/2021   • Pulmonary HTN (HCC)    • Severe tricuspid regurgitation 05/2018    ECHO (TTE) Diastolic dysfunction, Pulmonary HTN and Severe Tricuspid regurgitation   • Tinea unguium 01/24/2019     Past Surgical History:   Procedure Laterality Date   •  CHOLECYSTECTOMY     • COLONOSCOPY     • CYSTOSCOPY     • HERNIA REPAIR     • OTHER SURGICAL HISTORY      Brachytherapy   • REPLACEMENT TOTAL KNEE Left 05/10/2017   • REPLACEMENT TOTAL KNEE Right    • WRIST SURGERY       Family History   Problem Relation Age of Onset   • Heart disease Mother    • Hypertension Mother    • Breast cancer Daughter         Breast Neoplasm, Malignant   • Cancer Daughter         Unspecified   • Diabetes Daughter         Unspecified type     Social History     Socioeconomic History   • Marital status:    Tobacco Use   • Smoking status: Former     Packs/day: 2.00     Years: 15.00     Pack years: 30.00     Types: Cigars, Cigarettes     Start date: 1949     Quit date: 2009     Years since quittin.2   • Smokeless tobacco: Never   Vaping Use   • Vaping Use: Never used   Substance and Sexual Activity   • Alcohol use: Never   • Drug use: Never   • Sexual activity: Not Currently     Partners: Female     Birth control/protection: None     No Known Allergies  Current Outpatient Medications   Medication Sig Dispense Refill   • ALBUTEROL IN As Needed.     • amLODIPine (NORVASC) 5 MG tablet Take 1 tablet by mouth Daily. 90 tablet 1   • CBD (cannabidiol) oral oil Take  by mouth.     • cetirizine (zyrTEC) 10 MG tablet Take 1 tablet by mouth Daily. Indications: Hayfever 90 tablet 3   • Cholecalciferol 50 MCG (2000 UT) capsule Vitamin D3 2,000 unit oral capsule take 1 capsule by oral route daily   Suspended     • DHA-EPA-FLAXSEED OIL-VITAMIN E PO Take  by mouth.     • docusate sodium (COLACE) 100 MG capsule Take 1 capsule by mouth 2 (Two) Times a Day. Indications: Constipation 180 capsule 3   • Eliquis 2.5 MG tablet tablet Take 1 tablet by mouth 2 (Two) Times a Day. 180 tablet 1   • fluticasone (FLONASE) 50 MCG/ACT nasal spray into the nostril(s) as directed by provider.     • ipratropium (ATROVENT) 0.06 % nasal spray 2 sprays into the nostril(s) as directed by provider 4 (Four) Times a  Day. 15 mL 5   • metoprolol succinate XL (TOPROL-XL) 50 MG 24 hr tablet Take 1 tablet by mouth Daily. 90 tablet 3   • Mirabegron ER (MYRBETRIQ) 25 MG tablet sustained-release 24 hour 24 hr tablet Take 1 tablet by mouth Daily. 90 tablet 4   • multivitamin with minerals tablet tablet multivitamin oral tablet take 1 tablet by oral route daily   Suspended     • pantoprazole (PROTONIX) 40 MG EC tablet Take 1 tablet by mouth Daily. 90 tablet 1   • Refresh Celluvisc 1 % gel eye gel      • Restasis MultiDose 0.05 % ophthalmic emulsion Daily.     • spironolactone (ALDACTONE) 25 MG tablet Take 1 tablet by mouth Every Other Day. 45 tablet 3   • tamsulosin (FLOMAX) 0.4 MG capsule 24 hr capsule TAKE 1 CAPSULE DAILY 1/2 HOUR FOLLOWING THE SAME MEAL EACH DAY 90 capsule 3   • telmisartan (MICARDIS) 80 MG tablet Take 1 tablet by mouth Daily. 90 tablet 3   • torsemide (Demadex) 20 MG tablet Take 1.5 tablets by mouth Daily. 1.5 tab daily except  90 tablet 3     No current facility-administered medications for this visit.     Review of Systems   Constitutional: Negative.    Skin:        Painful toenails.   All other systems reviewed and are negative.      OBJECTIVE     Vitals:    22 1005   BP: 142/53   Pulse: 58   Temp: 96 °F (35.6 °C)   SpO2: 100%       Patient seen in no apparent distress.      PHYSICAL EXAM:     Foot/Ankle Exam:       General:   Appearance: elderly    Orientation: AAOx3    Affect: appropriate    Gait: antalgic    Assistance: cane    Shoe Gear:  Casual shoes    VASCULAR      Right Foot Vascularity   Normal vascular exam    Dorsalis pedis:  1+  Posterior tibial:  1+  Skin Temperature: cool    Edema Gradin+ and pitting  CFT:  < 3 seconds  Pedal Hair Growth:  Present  Varicosities: mild varicosities       Left Foot Vascularity   Normal vascular exam    Dorsalis pedis:  2+  Posterior tibial:  1+  Skin Temperature: cool    Edema Gradin+ and pitting  CFT:  < 3 seconds  Pedal Hair Growth:   Present  Varicosities: mild varicosities        NEUROLOGIC     Right Foot Neurologic   Normal sensation    Light touch sensation:  Normal  Vibratory sensation:  Normal  Hot/Cold sensation: normal    Protective Sensation using Willard-Martin Monofilament:  10     Left Foot Neurologic   Normal sensation    Light touch sensation:  Normal  Vibratory sensation:  Normal  Hot/cold sensation: normal    Protective Sensation using Willard-Martin Monofilament:  10     MUSCLE STRENGTH     Right Foot Muscle Strength   Foot dorsiflexion:  4  Foot plantar flexion:  4  Foot inversion:  4  Foot eversion:  4     Left Foot Muscle Strength   Foot dorsiflexion:  4  Foot plantar flexion:  4  Foot inversion:  4  Foot eversion:  4     RANGE OF MOTION      Right Foot Range of Motion   Foot and ankle ROM within normal limits       Left Foot Range of Motion   Foot and ankle ROM within normal limits       DERMATOLOGIC     Right Foot Dermatologic   Skin: skin intact    Nails: onychomycosis, abnormally thick, subungual debris, dystrophic nails and ingrown toenail    Nails comment:  Toenails 1, 2, 3, 4, and 5     Left Foot Dermatologic   Skin: skin intact    Nails: onychomycosis, abnormally thick, subungual debris, dystrophic nails and ingrown toenail    Nails comment:  Toenails 1, 2, 3, 4, and 5      ASSESSMENT/PLAN     Diagnoses and all orders for this visit:    1. Onychomycosis (Primary)    2. Onychocryptosis    3. Difficulty walking    4. Foot pain, bilateral        Comprehensive lower extremity examination and evaluation was performed.    Discussed findings and treatment plan including risks, benefits, and treatment options with patient in detail. Patient agreed with treatment plan.    Toenails 1 through 5 bilaterally were debrided in thickness and length and then smoothed with a Dremel Tool.  Tolerated the procedure well without complications.    An After Visit Summary was printed and given to the patient at discharge, including (if  requested) any available informative/educational handouts regarding diagnosis, treatment, or medications. All questions were answered to patient/family satisfaction. Should symptoms fail to improve or worsen they agree to call or return to clinic or to go to the Emergency Department. Discussed the importance of following up with any needed screening tests/labs/specialist appointments and any requested follow-up recommended by me today. Importance of maintaining follow-up discussed and patient accepts that missed appointments can delay diagnosis and potentially lead to worsening of conditions.    Return in about 9 weeks (around 1/13/2023) for Toenail Care., or sooner if acute issues arise.    This document has been electronically signed by Enzo Ferrell DPM on November 11, 2022 10:29 EST

## 2023-01-04 RX ORDER — TORSEMIDE 20 MG/1
30 TABLET ORAL DAILY
Qty: 90 TABLET | Refills: 3 | Status: CANCELLED | OUTPATIENT
Start: 2023-01-04

## 2023-01-05 RX ORDER — TORSEMIDE 20 MG/1
20 TABLET ORAL DAILY
Qty: 90 TABLET | Refills: 3 | Status: SHIPPED | OUTPATIENT
Start: 2023-01-05

## 2023-02-03 ENCOUNTER — OFFICE VISIT (OUTPATIENT)
Dept: CARDIOLOGY | Facility: CLINIC | Age: 88
End: 2023-02-03
Payer: MEDICARE

## 2023-02-03 VITALS
HEIGHT: 71 IN | DIASTOLIC BLOOD PRESSURE: 68 MMHG | WEIGHT: 206 LBS | BODY MASS INDEX: 28.84 KG/M2 | SYSTOLIC BLOOD PRESSURE: 179 MMHG | HEART RATE: 66 BPM

## 2023-02-03 DIAGNOSIS — I27.82 CHRONIC PULMONARY EMBOLISM, UNSPECIFIED PULMONARY EMBOLISM TYPE, UNSPECIFIED WHETHER ACUTE COR PULMONALE PRESENT: ICD-10-CM

## 2023-02-03 DIAGNOSIS — R60.0 EDEMA LEG: Primary | ICD-10-CM

## 2023-02-03 DIAGNOSIS — I10 ESSENTIAL HYPERTENSION: Chronic | ICD-10-CM

## 2023-02-03 PROCEDURE — 99213 OFFICE O/P EST LOW 20 MIN: CPT | Performed by: INTERNAL MEDICINE

## 2023-02-03 RX ORDER — APIXABAN 2.5 MG/1
2.5 TABLET, FILM COATED ORAL 2 TIMES DAILY
Qty: 180 TABLET | Refills: 3 | Status: SHIPPED | OUTPATIENT
Start: 2023-02-03

## 2023-02-03 RX ORDER — AMLODIPINE BESYLATE 5 MG/1
5 TABLET ORAL DAILY
Qty: 90 TABLET | Refills: 3 | Status: SHIPPED | OUTPATIENT
Start: 2023-02-03

## 2023-02-03 RX ORDER — SPIRONOLACTONE 25 MG/1
25 TABLET ORAL EVERY OTHER DAY
Qty: 45 TABLET | Refills: 3 | Status: SHIPPED | OUTPATIENT
Start: 2023-02-03

## 2023-02-03 NOTE — PROGRESS NOTES
Chief Complaint  Congestive Heart Failure, Hypertension, and Follow-up    Subjective        Vladimir Carter presents to [facility] CARDIOLOGY  History of present illness:    Patient states overall he is doing well.  He denies any chest pain or palpitations.  He does note chronic edema in his right leg greater than his left leg.  He does try to watch salt.  He has not been keeping his feet elevated.  He tried stockings in the past but it was too hard to get on.  He does note some mild dyspnea on exertion which he states is unchanged.  He is mainly limited by knee and back pain.  He notes no significant bleeding problems.      Past Medical History:   Diagnosis Date   • Abnormal bone density screening 09/29/2019    Spine-Osteopenia Hps-Osteoporosis   • Benign non-nodular prostatic hyperplasia with lower urinary tract symptoms 10/29/2018   • Bunion    • CAD (coronary artery disease)    • Callus    • Chronic pain syndrome    • CKD (chronic kidney disease) stage 3, GFR 30-59 ml/min ([facility])    • GERD without esophagitis 09/16/2016   • H/O prostate cancer    • HTN (hypertension)    • Ingrown toenail    • Long term (current) use of anticoagulants    • Lupus anticoagulant disorder ([facility])    • Moderate exercise     Active but no formal exercise   • EBNJA (obstructive sleep apnea)    • Osteoarthritis of left knee 04/22/2016    end-stage   • Osteoporosis     Osteoporosis in the hips/femur no fracture   • Overweight (BMI 25.0-29.9)    • Pedal edema 7/12/2021   • Pulmonary HTN (HCC)    • Severe tricuspid regurgitation 05/2018    ECHO (TTE) Diastolic dysfunction, Pulmonary HTN and Severe Tricuspid regurgitation   • Tinea unguium 01/24/2019         Past Surgical History:   Procedure Laterality Date   • CHOLECYSTECTOMY     • COLONOSCOPY     • CYSTOSCOPY     • HERNIA REPAIR     • OTHER SURGICAL HISTORY      Brachytherapy   • REPLACEMENT TOTAL KNEE Left 05/10/2017   • REPLACEMENT TOTAL KNEE Right    • WRIST SURGERY             Social History     Socioeconomic History   • Marital status:    Tobacco Use   • Smoking status: Former     Packs/day: 2.00     Years: 15.00     Pack years: 30.00     Types: Cigars, Cigarettes     Start date: 1949     Quit date: 2009     Years since quittin.4   • Smokeless tobacco: Never   Vaping Use   • Vaping Use: Never used   Substance and Sexual Activity   • Alcohol use: Never   • Drug use: Never   • Sexual activity: Not Currently     Partners: Female     Birth control/protection: None         Family History   Problem Relation Age of Onset   • Heart disease Mother    • Hypertension Mother    • Breast cancer Daughter         Breast Neoplasm, Malignant   • Cancer Daughter         Unspecified   • Diabetes Daughter         Unspecified type          No Known Allergies         Current Outpatient Medications:   •  ALBUTEROL IN, As Needed., Disp: , Rfl:   •  amLODIPine (NORVASC) 5 MG tablet, Take 1 tablet by mouth Daily., Disp: 90 tablet, Rfl: 3  •  CBD (cannabidiol) oral oil, Take  by mouth., Disp: , Rfl:   •  cetirizine (zyrTEC) 10 MG tablet, Take 1 tablet by mouth Daily. Indications: Hayfever, Disp: 90 tablet, Rfl: 3  •  Cholecalciferol 50 MCG (2000 UT) capsule, Vitamin D3 2,000 unit oral capsule take 1 capsule by oral route daily   Suspended, Disp: , Rfl:   •  DHA-EPA-FLAXSEED OIL-VITAMIN E PO, Take  by mouth., Disp: , Rfl:   •  docusate sodium (COLACE) 100 MG capsule, Take 1 capsule by mouth 2 (Two) Times a Day. Indications: Constipation, Disp: 180 capsule, Rfl: 3  •  Eliquis 2.5 MG tablet tablet, Take 1 tablet by mouth 2 (Two) Times a Day., Disp: 180 tablet, Rfl: 3  •  fluticasone (FLONASE) 50 MCG/ACT nasal spray, into the nostril(s) as directed by provider., Disp: , Rfl:   •  metoprolol succinate XL (TOPROL-XL) 50 MG 24 hr tablet, Take 1 tablet by mouth Daily., Disp: 90 tablet, Rfl: 3  •  Mirabegron ER (MYRBETRIQ) 25 MG tablet sustained-release 24 hour 24 hr tablet, Take 1 tablet by  "mouth Daily., Disp: 90 tablet, Rfl: 4  •  multivitamin with minerals tablet tablet, multivitamin oral tablet take 1 tablet by oral route daily   Suspended, Disp: , Rfl:   •  pantoprazole (PROTONIX) 40 MG EC tablet, Take 1 tablet by mouth Daily., Disp: 90 tablet, Rfl: 1  •  Refresh Celluvisc 1 % gel eye gel, , Disp: , Rfl:   •  Restasis MultiDose 0.05 % ophthalmic emulsion, Daily., Disp: , Rfl:   •  spironolactone (ALDACTONE) 25 MG tablet, Take 1 tablet by mouth Every Other Day., Disp: 45 tablet, Rfl: 3  •  tamsulosin (FLOMAX) 0.4 MG capsule 24 hr capsule, TAKE 1 CAPSULE DAILY 1/2 HOUR FOLLOWING THE SAME MEAL EACH DAY, Disp: 90 capsule, Rfl: 3  •  telmisartan (MICARDIS) 80 MG tablet, Take 1 tablet by mouth Daily., Disp: 90 tablet, Rfl: 3  •  torsemide (Demadex) 20 MG tablet, Take 1 tablet by mouth Daily. 1.5 tab daily except sunday, Disp: 90 tablet, Rfl: 3  •  ipratropium (ATROVENT) 0.06 % nasal spray, 2 sprays into the nostril(s) as directed by provider 4 (Four) Times a Day., Disp: 15 mL, Rfl: 5      ROS:  Cardiac review of systems positive for chronic mild shortness of breath and edema    Objective     /68   Pulse 66   Ht 180.3 cm (71\")   Wt 93.4 kg (206 lb)   BMI 28.73 kg/m²       General Appearance:   · well developed  · well nourished  HENT:   · oropharynx moist  · lips not cyanotic  Respiratory:  · no respiratory distress  · normal breath sounds  · no rales  Cardiovascular:  · no jugular venous distention  · regular rhythm  · S1 normal, S2 normal  · no S3, no S4   · no murmur  · no rub, no thrill  · No carotid bruit  · pedal pulses normal  · lower extremity edema: none    Musculoskeletal:  · no clubbing of fingers.   · normocephalic, head atraumatic  Skin:   · warm, dry  Psychiatric:  · judgement and insight appropriate  · normal mood and affect    ECHO:  Results for orders placed in visit on 08/03/22    Adult Transthoracic Echo Complete W/ Cont if Necessary Per Protocol    Interpretation Summary  · " Estimated left ventricular EF was in agreement with the calculated left ventricular EF. Left ventricular ejection fraction appears to be 61 - 65%. Left ventricular systolic function is normal.  · Left ventricular diastolic function is consistent with (grade I) impaired relaxation.  · There is calcification of the aortic valve. There is mild aortic valve regurgitation noted but no significant stenosis  · Mild dilation of the aortic root is present.  · Estimated right ventricular systolic pressure from tricuspid regurgitation is mildly elevated (35-45 mmHg).  · Mild pulmonary hypertension is present.    STRESS:    CATH:  No results found for this or any previous visit.    BMP:   Glucose   Date Value Ref Range Status   06/14/2022 107 (H) 65 - 99 mg/dL Final     BUN   Date Value Ref Range Status   06/14/2022 32 (H) 8 - 23 mg/dL Final     Creatinine   Date Value Ref Range Status   06/14/2022 1.60 (H) 0.76 - 1.27 mg/dL Final     Sodium   Date Value Ref Range Status   06/14/2022 142 136 - 145 mmol/L Final     Potassium   Date Value Ref Range Status   06/14/2022 4.2 3.5 - 5.2 mmol/L Final     Chloride   Date Value Ref Range Status   06/14/2022 104 98 - 107 mmol/L Final     CO2   Date Value Ref Range Status   06/14/2022 27.3 22.0 - 29.0 mmol/L Final     Calcium   Date Value Ref Range Status   06/14/2022 9.1 8.2 - 9.6 mg/dL Final     BUN/Creatinine Ratio   Date Value Ref Range Status   06/14/2022 20.0 7.0 - 25.0 Final     Anion Gap   Date Value Ref Range Status   06/14/2022 10.7 5.0 - 15.0 mmol/L Final     eGFR   Date Value Ref Range Status   06/14/2022 39.7 (L) >60.0 mL/min/1.73 Final     Comment:     National Kidney Foundation and American Society of Nephrology (ASN) Task Force recommended calculation based on the Chronic Kidney Disease Epidemiology Collaboration (CKD-EPI) equation refit without adjustment for race.     LIPIDS:  Total Cholesterol   Date Value Ref Range Status   06/14/2022 131 0 - 200 mg/dL Final      Triglycerides   Date Value Ref Range Status   06/14/2022 71 0 - 150 mg/dL Final     HDL Cholesterol   Date Value Ref Range Status   06/14/2022 41 40 - 60 mg/dL Final     LDL Cholesterol    Date Value Ref Range Status   06/14/2022 76 0 - 100 mg/dL Final     VLDL Cholesterol   Date Value Ref Range Status   06/14/2022 14 5 - 40 mg/dL Final     LDL/HDL Ratio   Date Value Ref Range Status   06/14/2022 1.85  Final         Procedures             ASSESSMENT:  Encounter Diagnoses   Name Primary?   • Essential hypertension    • Edema leg Yes   • Chronic pulmonary embolism, unspecified pulmonary embolism type, unspecified whether acute cor pulmonale present ([facility])          PLAN:    1.  Patient does have edema in his right leg greater than his left leg that has been chronic.  He is watching his salt.  I did ask him to keep his feet elevated and talked about copper fit stockings.  I also gave him the option of going up on the torsemide but we are going to hold off on this.  2.  Blood pressure is elevated but the last 3 times has been under good control and they watch it every morning and it runs in the 130s to 140s systolic range.  We will just continue to monitor.  3.  Patient is on the Eliquis since he had a pulmonary embolism back in 2018.  He also apparently was diagnosed with lupus anticoagulant.  He notes no bleeding problems.  4.  Patient had cholesterol checked 6/22 with LDL 76, HDL 41, and triglycerides 71.  These are under excellent control.  5.  Patient had echocardiogram done 8/3/2022 with normal ejection fraction, mild aortic insufficiency and trace to mild tricuspid regurgitation.  He has in his chart severe tricuspid regurgitation but most recent echo does not show this.  6.  Overall patient appears to be doing well from a heart standpoint.  We will see back in 6 months, sooner if needed.          Patient was given instructions and counseling regarding his condition or for health maintenance advice. Please see  specific information pulled into the AVS if appropriate.         Anuel Pena MD   2/3/2023  10:20 EST

## 2023-02-06 ENCOUNTER — OFFICE VISIT (OUTPATIENT)
Dept: PODIATRY | Facility: CLINIC | Age: 88
End: 2023-02-06
Payer: MEDICARE

## 2023-02-06 VITALS
SYSTOLIC BLOOD PRESSURE: 154 MMHG | DIASTOLIC BLOOD PRESSURE: 60 MMHG | WEIGHT: 208 LBS | HEART RATE: 66 BPM | HEIGHT: 71 IN | TEMPERATURE: 97.7 F | BODY MASS INDEX: 29.12 KG/M2 | OXYGEN SATURATION: 96 %

## 2023-02-06 DIAGNOSIS — M79.671 FOOT PAIN, BILATERAL: Primary | ICD-10-CM

## 2023-02-06 DIAGNOSIS — B35.1 ONYCHOMYCOSIS: ICD-10-CM

## 2023-02-06 DIAGNOSIS — M79.672 FOOT PAIN, BILATERAL: Primary | ICD-10-CM

## 2023-02-06 DIAGNOSIS — L60.0 ONYCHOCRYPTOSIS: ICD-10-CM

## 2023-02-06 PROCEDURE — 11721 DEBRIDE NAIL 6 OR MORE: CPT | Performed by: PODIATRIST

## 2023-02-06 NOTE — PROGRESS NOTES
[facility] - PODIATRY    Today's Date: 02/06/23    Patient Name: Vladimir Carter  MRN: [id number]  CSN: [id number]  PCP: Mango Kumar DO, Last PCP Visit: 9/28/2022  Referring Provider: No ref. provider found    SUBJECTIVE     Chief Complaint   Patient presents with   • Left Foot - Follow-up, Nail Problem   • Right Foot - Follow-up, Nail Problem     HPI: Vladimir Carter, a 89 y.o.male, comes to clinic.    New, Established, New Problem:  established   Location:  Toenails  Duration:   Greater than five years  Onset:  Gradual  Nature:  sore with palpation.  Stable, worsening, improving:   Stable  Aggravating factors:  Pain with shoe gear and ambulation.  Previous Treatment:  debridement    Medical changes:  Seen by Cardiology    Patient denies any fevers, chills, nausea, vomiting, shortness of breathe, nor any other constitutional signs nor symptoms.         Past Medical History:   Diagnosis Date   • Abnormal bone density screening 09/29/2019    Spine-Osteopenia Hps-Osteoporosis   • Benign non-nodular prostatic hyperplasia with lower urinary tract symptoms 10/29/2018   • Bunion    • CAD (coronary artery disease)    • Callus    • Chronic pain syndrome    • CKD (chronic kidney disease) stage 3, GFR 30-59 ml/min ([facility])    • GERD without esophagitis 09/16/2016   • H/O prostate cancer    • HTN (hypertension)    • Ingrown toenail    • Long term (current) use of anticoagulants    • Lupus anticoagulant disorder (HCC)    • Moderate exercise     Active but no formal exercise   • BENJA (obstructive sleep apnea)    • Osteoarthritis of left knee 04/22/2016    end-stage   • Osteoporosis     Osteoporosis in the hips/femur no fracture   • Overweight (BMI 25.0-29.9)    • Pedal edema 7/12/2021   • Pulmonary HTN (HCC)    • Severe tricuspid regurgitation 05/2018    ECHO (TTE) Diastolic dysfunction, Pulmonary HTN and Severe Tricuspid regurgitation   • Tinea unguium 01/24/2019     Past Surgical History:   Procedure  Laterality Date   • CHOLECYSTECTOMY     • COLONOSCOPY     • CYSTOSCOPY     • HERNIA REPAIR     • OTHER SURGICAL HISTORY      Brachytherapy   • REPLACEMENT TOTAL KNEE Left 05/10/2017   • REPLACEMENT TOTAL KNEE Right    • WRIST SURGERY       Family History   Problem Relation Age of Onset   • Heart disease Mother    • Hypertension Mother    • Breast cancer Daughter         Breast Neoplasm, Malignant   • Cancer Daughter         Unspecified   • Diabetes Daughter         Unspecified type     Social History     Socioeconomic History   • Marital status:    Tobacco Use   • Smoking status: Former     Packs/day: 2.00     Years: 15.00     Pack years: 30.00     Types: Cigarettes, Cigars     Start date: 1949     Quit date: 2009     Years since quittin.4   • Smokeless tobacco: Never   Vaping Use   • Vaping Use: Never used   Substance and Sexual Activity   • Alcohol use: Never   • Drug use: Never   • Sexual activity: Not Currently     Partners: Female     Birth control/protection: None     No Known Allergies  Current Outpatient Medications   Medication Sig Dispense Refill   • ALBUTEROL IN As Needed.     • amLODIPine (NORVASC) 5 MG tablet Take 1 tablet by mouth Daily. 90 tablet 3   • CBD (cannabidiol) oral oil Take  by mouth.     • cetirizine (zyrTEC) 10 MG tablet Take 1 tablet by mouth Daily. Indications: Hayfever 90 tablet 3   • Cholecalciferol 50 MCG (2000 UT) capsule Vitamin D3 2,000 unit oral capsule take 1 capsule by oral route daily   Suspended     • DHA-EPA-FLAXSEED OIL-VITAMIN E PO Take  by mouth.     • docusate sodium (COLACE) 100 MG capsule Take 1 capsule by mouth 2 (Two) Times a Day. Indications: Constipation 180 capsule 3   • Eliquis 2.5 MG tablet tablet Take 1 tablet by mouth 2 (Two) Times a Day. 180 tablet 3   • fluticasone (FLONASE) 50 MCG/ACT nasal spray into the nostril(s) as directed by provider.     • ipratropium (ATROVENT) 0.06 % nasal spray 2 sprays into the nostril(s) as directed by  provider 4 (Four) Times a Day. 15 mL 5   • metoprolol succinate XL (TOPROL-XL) 50 MG 24 hr tablet Take 1 tablet by mouth Daily. 90 tablet 3   • Mirabegron ER (MYRBETRIQ) 25 MG tablet sustained-release 24 hour 24 hr tablet Take 1 tablet by mouth Daily. 90 tablet 4   • multivitamin with minerals tablet tablet multivitamin oral tablet take 1 tablet by oral route daily   Suspended     • pantoprazole (PROTONIX) 40 MG EC tablet Take 1 tablet by mouth Daily. 90 tablet 1   • Refresh Celluvisc 1 % gel eye gel      • Restasis MultiDose 0.05 % ophthalmic emulsion Daily.     • spironolactone (ALDACTONE) 25 MG tablet Take 1 tablet by mouth Every Other Day. 45 tablet 3   • tamsulosin (FLOMAX) 0.4 MG capsule 24 hr capsule TAKE 1 CAPSULE DAILY 1/2 HOUR FOLLOWING THE SAME MEAL EACH DAY 90 capsule 3   • telmisartan (MICARDIS) 80 MG tablet Take 1 tablet by mouth Daily. 90 tablet 3   • torsemide (Demadex) 20 MG tablet Take 1 tablet by mouth Daily. 1.5 tab daily except  90 tablet 3     No current facility-administered medications for this visit.     Review of Systems   Constitutional: Negative.    Skin:        Painful toenails.   All other systems reviewed and are negative.      OBJECTIVE     Vitals:    23 0900   BP: 154/60   Pulse: 66   Temp: 97.7 °F (36.5 °C)   SpO2: 96%       Patient seen in no apparent distress.      PHYSICAL EXAM:     Foot/Ankle Exam:       General:   Appearance: elderly    Orientation: AAOx3    Affect: appropriate    Gait: antalgic    Assistance: cane    Shoe Gear:  Casual shoes    VASCULAR      Right Foot Vascularity   Normal vascular exam    Dorsalis pedis:  1+  Posterior tibial:  1+  Skin Temperature: cool    Edema Gradin+ and pitting  CFT:  < 3 seconds  Pedal Hair Growth:  Present  Varicosities: mild varicosities       Left Foot Vascularity   Normal vascular exam    Dorsalis pedis:  2+  Posterior tibial:  1+  Skin Temperature: cool    Edema Gradin+ and pitting  CFT:  < 3 seconds  Pedal  Hair Growth:  Present  Varicosities: mild varicosities        NEUROLOGIC     Right Foot Neurologic   Normal sensation    Light touch sensation:  Normal  Vibratory sensation:  Normal  Hot/Cold sensation: normal    Protective Sensation using [place] Monofilament:  10     Left Foot Neurologic   Normal sensation    Light touch sensation:  Normal  Vibratory sensation:  Normal  Hot/cold sensation: normal    Protective Sensation using [place] Monofilament:  10     MUSCLE STRENGTH     Right Foot Muscle Strength   Foot dorsiflexion:  4  Foot plantar flexion:  4  Foot inversion:  4  Foot eversion:  4     Left Foot Muscle Strength   Foot dorsiflexion:  4  Foot plantar flexion:  4  Foot inversion:  4  Foot eversion:  4     RANGE OF MOTION      Right Foot Range of Motion   Foot and ankle ROM within normal limits       Left Foot Range of Motion   Foot and ankle ROM within normal limits       DERMATOLOGIC     Right Foot Dermatologic   Skin: skin intact    Nails: onychomycosis, abnormally thick, subungual debris, dystrophic nails and ingrown toenail    Nails comment:  Toenails 1, 2, 3, 4, and 5     Left Foot Dermatologic   Skin: skin intact    Nails: onychomycosis, abnormally thick, subungual debris, dystrophic nails and ingrown toenail    Nails comment:  Toenails 1, 2, 3, 4, and 5      ASSESSMENT/PLAN     Diagnoses and all orders for this visit:    1. Foot pain, bilateral (Primary)    2. Onychomycosis    3. Onychocryptosis        Comprehensive lower extremity examination and evaluation was performed.    Discussed findings and treatment plan including risks, benefits, and treatment options with patient in detail. Patient agreed with treatment plan.    Toenails 1 through 5 bilaterally were debrided in thickness and length and then smoothed with a Dremel Tool.  Tolerated the procedure well without complications.    An After Visit Summary was printed and given to the patient at discharge, including (if requested) any  available informative/educational handouts regarding diagnosis, treatment, or medications. All questions were answered to patient/family satisfaction. Should symptoms fail to improve or worsen they agree to call or return to clinic or to go to the Emergency Department. Discussed the importance of following up with any needed screening tests/labs/specialist appointments and any requested follow-up recommended by me today. Importance of maintaining follow-up discussed and patient accepts that missed appointments can delay diagnosis and potentially lead to worsening of conditions.    Return in about 9 weeks (around 4/10/2023) for Toenail Care., or sooner if acute issues arise.    This document has been electronically signed by Enzo Ferrell DPM on February 6, 2023 09:20 EST

## 2023-02-23 ENCOUNTER — TELEPHONE (OUTPATIENT)
Dept: UROLOGY | Facility: CLINIC | Age: 88
End: 2023-02-23
Payer: MEDICARE

## 2023-02-23 NOTE — TELEPHONE ENCOUNTER
LMOM FOR PT TO RETURN OUR CALL.  PT NEEDS PSA PRIOR TO APPT.  PT HAS APPT ON 2/24/23.  PT MAY NEED TO MAEGAN APPT IF CAN'T GET PSA DONE.

## 2023-02-24 ENCOUNTER — LAB (OUTPATIENT)
Dept: LAB | Facility: HOSPITAL | Age: 88
End: 2023-02-24
Payer: MEDICARE

## 2023-02-24 DIAGNOSIS — I38 HEART FAILURE DUE TO VALVULAR DISEASE, CHRONIC, DIASTOLIC: ICD-10-CM

## 2023-02-24 DIAGNOSIS — N18.32 STAGE 3B CHRONIC KIDNEY DISEASE: Chronic | ICD-10-CM

## 2023-02-24 DIAGNOSIS — N40.1 BENIGN PROSTATIC HYPERPLASIA WITH URINARY FREQUENCY: ICD-10-CM

## 2023-02-24 DIAGNOSIS — E78.5 HYPERLIPIDEMIA LDL GOAL <100: ICD-10-CM

## 2023-02-24 DIAGNOSIS — R35.0 BENIGN PROSTATIC HYPERPLASIA WITH URINARY FREQUENCY: ICD-10-CM

## 2023-02-24 DIAGNOSIS — I25.10 MILD CAD: ICD-10-CM

## 2023-02-24 DIAGNOSIS — I50.32 HEART FAILURE DUE TO VALVULAR DISEASE, CHRONIC, DIASTOLIC: ICD-10-CM

## 2023-02-24 DIAGNOSIS — I10 ESSENTIAL HYPERTENSION: Chronic | ICD-10-CM

## 2023-02-24 LAB
ALBUMIN SERPL-MCNC: 3.9 G/DL (ref 3.5–5.2)
ALBUMIN/GLOB SERPL: 1.4 G/DL
ALP SERPL-CCNC: 68 U/L (ref 39–117)
ALT SERPL W P-5'-P-CCNC: 7 U/L (ref 1–41)
ANION GAP SERPL CALCULATED.3IONS-SCNC: 8.3 MMOL/L (ref 5–15)
AST SERPL-CCNC: 18 U/L (ref 1–40)
BASOPHILS # BLD AUTO: 0.05 10*3/MM3 (ref 0–0.2)
BASOPHILS NFR BLD AUTO: 0.6 % (ref 0–1.5)
BILIRUB SERPL-MCNC: 0.4 MG/DL (ref 0–1.2)
BUN SERPL-MCNC: 27 MG/DL (ref 8–23)
BUN/CREAT SERPL: 15.8 (ref 7–25)
CALCIUM SPEC-SCNC: 9.5 MG/DL (ref 8.2–9.6)
CHLORIDE SERPL-SCNC: 104 MMOL/L (ref 98–107)
CHOLEST SERPL-MCNC: 138 MG/DL (ref 0–200)
CO2 SERPL-SCNC: 31.7 MMOL/L (ref 22–29)
CREAT SERPL-MCNC: 1.71 MG/DL (ref 0.76–1.27)
DEPRECATED RDW RBC AUTO: 43.6 FL (ref 37–54)
EGFRCR SERPLBLD CKD-EPI 2021: 36.4 ML/MIN/1.73
EOSINOPHIL # BLD AUTO: 0.14 10*3/MM3 (ref 0–0.4)
EOSINOPHIL NFR BLD AUTO: 1.7 % (ref 0.3–6.2)
ERYTHROCYTE [DISTWIDTH] IN BLOOD BY AUTOMATED COUNT: 12.5 % (ref 12.3–15.4)
GLOBULIN UR ELPH-MCNC: 2.7 GM/DL
GLUCOSE SERPL-MCNC: 125 MG/DL (ref 65–99)
HCT VFR BLD AUTO: 41.5 % (ref 37.5–51)
HDLC SERPL-MCNC: 41 MG/DL (ref 40–60)
HGB BLD-MCNC: 14.1 G/DL (ref 13–17.7)
IMM GRANULOCYTES # BLD AUTO: 0.02 10*3/MM3 (ref 0–0.05)
IMM GRANULOCYTES NFR BLD AUTO: 0.2 % (ref 0–0.5)
LDLC SERPL CALC-MCNC: 77 MG/DL (ref 0–100)
LDLC/HDLC SERPL: 1.84 {RATIO}
LYMPHOCYTES # BLD AUTO: 2.03 10*3/MM3 (ref 0.7–3.1)
LYMPHOCYTES NFR BLD AUTO: 24.8 % (ref 19.6–45.3)
MAGNESIUM SERPL-MCNC: 2.1 MG/DL (ref 1.7–2.3)
MCH RBC QN AUTO: 32.5 PG (ref 26.6–33)
MCHC RBC AUTO-ENTMCNC: 34 G/DL (ref 31.5–35.7)
MCV RBC AUTO: 95.6 FL (ref 79–97)
MONOCYTES # BLD AUTO: 0.88 10*3/MM3 (ref 0.1–0.9)
MONOCYTES NFR BLD AUTO: 10.8 % (ref 5–12)
NEUTROPHILS NFR BLD AUTO: 5.05 10*3/MM3 (ref 1.7–7)
NEUTROPHILS NFR BLD AUTO: 61.9 % (ref 42.7–76)
NRBC BLD AUTO-RTO: 0 /100 WBC (ref 0–0.2)
NT-PROBNP SERPL-MCNC: 1233 PG/ML (ref 0–1800)
PLATELET # BLD AUTO: 205 10*3/MM3 (ref 140–450)
PMV BLD AUTO: 10.6 FL (ref 6–12)
POTASSIUM SERPL-SCNC: 3.9 MMOL/L (ref 3.5–5.2)
PROT SERPL-MCNC: 6.6 G/DL (ref 6–8.5)
PSA SERPL-MCNC: <0.014 NG/ML (ref 0–4)
RBC # BLD AUTO: 4.34 10*6/MM3 (ref 4.14–5.8)
SODIUM SERPL-SCNC: 144 MMOL/L (ref 136–145)
T4 FREE SERPL-MCNC: 1.31 NG/DL (ref 0.93–1.7)
TRIGL SERPL-MCNC: 108 MG/DL (ref 0–150)
TSH SERPL DL<=0.05 MIU/L-ACNC: 3.85 UIU/ML (ref 0.27–4.2)
VLDLC SERPL-MCNC: 20 MG/DL (ref 5–40)
WBC NRBC COR # BLD: 8.17 10*3/MM3 (ref 3.4–10.8)

## 2023-02-24 PROCEDURE — 83735 ASSAY OF MAGNESIUM: CPT

## 2023-02-24 PROCEDURE — 84439 ASSAY OF FREE THYROXINE: CPT

## 2023-02-24 PROCEDURE — 83880 ASSAY OF NATRIURETIC PEPTIDE: CPT

## 2023-02-24 PROCEDURE — 84443 ASSAY THYROID STIM HORMONE: CPT

## 2023-02-24 PROCEDURE — 80053 COMPREHEN METABOLIC PANEL: CPT

## 2023-02-24 PROCEDURE — 85025 COMPLETE CBC W/AUTO DIFF WBC: CPT

## 2023-02-24 PROCEDURE — 84153 ASSAY OF PSA TOTAL: CPT

## 2023-02-24 PROCEDURE — 80061 LIPID PANEL: CPT

## 2023-02-24 PROCEDURE — 36415 COLL VENOUS BLD VENIPUNCTURE: CPT

## 2023-03-15 DIAGNOSIS — I50.9 CHF DUE TO VALVULAR DISEASE: ICD-10-CM

## 2023-03-15 DIAGNOSIS — I36.1 NONRHEUMATIC TRICUSPID VALVE REGURGITATION: Primary | ICD-10-CM

## 2023-03-15 DIAGNOSIS — I38 CHF DUE TO VALVULAR DISEASE: ICD-10-CM

## 2023-03-21 ENCOUNTER — OFFICE VISIT (OUTPATIENT)
Dept: CARDIOLOGY | Facility: CLINIC | Age: 88
End: 2023-03-21
Payer: MEDICARE

## 2023-03-21 VITALS
HEIGHT: 71 IN | BODY MASS INDEX: 28.98 KG/M2 | SYSTOLIC BLOOD PRESSURE: 144 MMHG | HEART RATE: 63 BPM | WEIGHT: 207 LBS | DIASTOLIC BLOOD PRESSURE: 56 MMHG

## 2023-03-21 DIAGNOSIS — I10 ESSENTIAL HYPERTENSION: Chronic | ICD-10-CM

## 2023-03-21 DIAGNOSIS — I50.32 CHF (CONGESTIVE HEART FAILURE), NYHA CLASS II, CHRONIC, DIASTOLIC: Primary | ICD-10-CM

## 2023-03-21 DIAGNOSIS — E78.5 HYPERLIPIDEMIA LDL GOAL <100: Chronic | ICD-10-CM

## 2023-03-21 DIAGNOSIS — R60.0 PEDAL EDEMA: ICD-10-CM

## 2023-03-21 LAB
ALBUMIN SERPL-MCNC: 3.7 G/DL (ref 3.5–5.2)
ALBUMIN/GLOB SERPL: 1.4 G/DL
ALP SERPL-CCNC: 67 U/L (ref 39–117)
ALT SERPL W P-5'-P-CCNC: 7 U/L (ref 1–41)
ANION GAP SERPL CALCULATED.3IONS-SCNC: 11.2 MMOL/L (ref 5–15)
AST SERPL-CCNC: 16 U/L (ref 1–40)
BASOPHILS # BLD AUTO: 0.05 10*3/MM3 (ref 0–0.2)
BASOPHILS NFR BLD AUTO: 0.6 % (ref 0–1.5)
BILIRUB SERPL-MCNC: 0.4 MG/DL (ref 0–1.2)
BUN SERPL-MCNC: 21 MG/DL (ref 8–23)
BUN/CREAT SERPL: 15.2 (ref 7–25)
CALCIUM SPEC-SCNC: 9.3 MG/DL (ref 8.2–9.6)
CHLORIDE SERPL-SCNC: 107 MMOL/L (ref 98–107)
CO2 SERPL-SCNC: 28.8 MMOL/L (ref 22–29)
CREAT SERPL-MCNC: 1.38 MG/DL (ref 0.76–1.27)
DEPRECATED RDW RBC AUTO: 49.8 FL (ref 37–54)
EGFRCR SERPLBLD CKD-EPI 2021: 47.1 ML/MIN/1.73
EOSINOPHIL # BLD AUTO: 0.24 10*3/MM3 (ref 0–0.4)
EOSINOPHIL NFR BLD AUTO: 2.9 % (ref 0.3–6.2)
ERYTHROCYTE [DISTWIDTH] IN BLOOD BY AUTOMATED COUNT: 13.3 % (ref 12.3–15.4)
GLOBULIN UR ELPH-MCNC: 2.6 GM/DL
GLUCOSE SERPL-MCNC: 113 MG/DL (ref 65–99)
HCT VFR BLD AUTO: 44.1 % (ref 37.5–51)
HGB BLD-MCNC: 14.1 G/DL (ref 13–17.7)
IMM GRANULOCYTES # BLD AUTO: 0.03 10*3/MM3 (ref 0–0.05)
IMM GRANULOCYTES NFR BLD AUTO: 0.4 % (ref 0–0.5)
IRON 24H UR-MRATE: 62 MCG/DL (ref 59–158)
IRON SATN MFR SERPL: 23 % (ref 20–50)
LYMPHOCYTES # BLD AUTO: 2.54 10*3/MM3 (ref 0.7–3.1)
LYMPHOCYTES NFR BLD AUTO: 30.7 % (ref 19.6–45.3)
MCH RBC QN AUTO: 32.2 PG (ref 26.6–33)
MCHC RBC AUTO-ENTMCNC: 32 G/DL (ref 31.5–35.7)
MCV RBC AUTO: 100.7 FL (ref 79–97)
MONOCYTES # BLD AUTO: 0.98 10*3/MM3 (ref 0.1–0.9)
MONOCYTES NFR BLD AUTO: 11.9 % (ref 5–12)
NEUTROPHILS NFR BLD AUTO: 4.43 10*3/MM3 (ref 1.7–7)
NEUTROPHILS NFR BLD AUTO: 53.5 % (ref 42.7–76)
NRBC BLD AUTO-RTO: 0 /100 WBC (ref 0–0.2)
NT-PROBNP SERPL-MCNC: 982 PG/ML (ref 0–1800)
PLATELET # BLD AUTO: 212 10*3/MM3 (ref 140–450)
PMV BLD AUTO: 10.6 FL (ref 6–12)
POTASSIUM SERPL-SCNC: 4.3 MMOL/L (ref 3.5–5.2)
PROT SERPL-MCNC: 6.3 G/DL (ref 6–8.5)
RBC # BLD AUTO: 4.38 10*6/MM3 (ref 4.14–5.8)
SODIUM SERPL-SCNC: 147 MMOL/L (ref 136–145)
T4 FREE SERPL-MCNC: 1.29 NG/DL (ref 0.93–1.7)
TIBC SERPL-MCNC: 271 MCG/DL (ref 298–536)
TRANSFERRIN SERPL-MCNC: 182 MG/DL (ref 200–360)
TROPONIN T SERPL HS-MCNC: 22 NG/L
TSH SERPL DL<=0.05 MIU/L-ACNC: 3.27 UIU/ML (ref 0.27–4.2)
WBC NRBC COR # BLD: 8.27 10*3/MM3 (ref 3.4–10.8)

## 2023-03-21 PROCEDURE — 1160F RVW MEDS BY RX/DR IN RCRD: CPT | Performed by: INTERNAL MEDICINE

## 2023-03-21 PROCEDURE — 80053 COMPREHEN METABOLIC PANEL: CPT | Performed by: INTERNAL MEDICINE

## 2023-03-21 PROCEDURE — 84165 PROTEIN E-PHORESIS SERUM: CPT | Performed by: INTERNAL MEDICINE

## 2023-03-21 PROCEDURE — 84484 ASSAY OF TROPONIN QUANT: CPT | Performed by: INTERNAL MEDICINE

## 2023-03-21 PROCEDURE — 84439 ASSAY OF FREE THYROXINE: CPT | Performed by: INTERNAL MEDICINE

## 2023-03-21 PROCEDURE — 83521 IG LIGHT CHAINS FREE EACH: CPT | Performed by: INTERNAL MEDICINE

## 2023-03-21 PROCEDURE — 99215 OFFICE O/P EST HI 40 MIN: CPT | Performed by: INTERNAL MEDICINE

## 2023-03-21 PROCEDURE — 83880 ASSAY OF NATRIURETIC PEPTIDE: CPT | Performed by: INTERNAL MEDICINE

## 2023-03-21 PROCEDURE — 93000 ELECTROCARDIOGRAM COMPLETE: CPT | Performed by: INTERNAL MEDICINE

## 2023-03-21 PROCEDURE — 1159F MED LIST DOCD IN RCRD: CPT | Performed by: INTERNAL MEDICINE

## 2023-03-21 PROCEDURE — 82784 ASSAY IGA/IGD/IGG/IGM EACH: CPT | Performed by: INTERNAL MEDICINE

## 2023-03-21 PROCEDURE — 85025 COMPLETE CBC W/AUTO DIFF WBC: CPT | Performed by: INTERNAL MEDICINE

## 2023-03-21 PROCEDURE — 86334 IMMUNOFIX E-PHORESIS SERUM: CPT | Performed by: INTERNAL MEDICINE

## 2023-03-21 PROCEDURE — 84466 ASSAY OF TRANSFERRIN: CPT | Performed by: INTERNAL MEDICINE

## 2023-03-21 PROCEDURE — 84443 ASSAY THYROID STIM HORMONE: CPT | Performed by: INTERNAL MEDICINE

## 2023-03-21 PROCEDURE — 83540 ASSAY OF IRON: CPT | Performed by: INTERNAL MEDICINE

## 2023-03-21 NOTE — ASSESSMENT & PLAN NOTE
Based on his home blood pressure readings his blood pressure is well regulated.  We will continue the telmisartan as is but reduce his amlodipine to see if it will help with his pedal edema

## 2023-03-21 NOTE — PROGRESS NOTES
New Patient Office Visit    Chief Complaint  Heart failure due to valvular disease, chronic, diastolic     Subjective            Vladimir Carter presents to [facility] CARDIOLOGY [place]  History of Present Illness  Mr. Carter is a 89 years old gentleman who has been referred by Dr. Kumar for worsening shortness of breath and pedal edema.  He has chronic diastolic heart failure, hypertension hyperlipidemia and prior history of pulmonary embolism on chronic prophylactic anticoagulation.  His pedal edema has gotten much worse over the last several weeks and is more shortness of breath on exertion.  He walks with a cane but gets short of breath with minimal activities.  He denies chest pain palpitations dizziness or syncope.  He denies paroxysmal nocturnal dyspnea orthopnea      Past Medical History:   Diagnosis Date   • Abnormal bone density screening 09/29/2019    Spine-Osteopenia Hps-Osteoporosis   • Benign non-nodular prostatic hyperplasia with lower urinary tract symptoms 10/29/2018   • Bunion    • CAD (coronary artery disease)    • Callus    • Chronic pain syndrome    • CKD (chronic kidney disease) stage 3, GFR 30-59 ml/min ([facility])    • GERD without esophagitis 09/16/2016   • H/O prostate cancer    • HTN (hypertension)    • Ingrown toenail    • Long term (current) use of anticoagulants    • Lupus anticoagulant disorder (HCC)    • Moderate exercise     Active but no formal exercise   • BENJA (obstructive sleep apnea)    • Osteoarthritis of left knee 04/22/2016    end-stage   • Osteoporosis     Osteoporosis in the hips/femur no fracture   • Overweight (BMI 25.0-29.9)    • Pedal edema 7/12/2021   • Pulmonary HTN (HCC)    • Severe tricuspid regurgitation 05/2018    ECHO (TTE) Diastolic dysfunction, Pulmonary HTN and Severe Tricuspid regurgitation   • Tinea unguium 01/24/2019       No Known Allergies     Past Surgical History:   Procedure Laterality Date   • CHOLECYSTECTOMY     • COLONOSCOPY     •  CYSTOSCOPY     • HERNIA REPAIR     • OTHER SURGICAL HISTORY      Brachytherapy   • REPLACEMENT TOTAL KNEE Left 05/10/2017   • REPLACEMENT TOTAL KNEE Right    • WRIST SURGERY          Social History     Tobacco Use   • Smoking status: Former     Packs/day: 2.00     Years: 15.00     Pack years: 30.00     Types: Cigarettes, Cigars     Start date: 1949     Quit date: 2009     Years since quittin.5   • Smokeless tobacco: Never   Vaping Use   • Vaping Use: Never used   Substance Use Topics   • Alcohol use: Never   • Drug use: Never       Family History   Problem Relation Age of Onset   • Heart disease Mother    • Hypertension Mother    • Breast cancer Daughter         Breast Neoplasm, Malignant   • Cancer Daughter         Unspecified   • Diabetes Daughter         Unspecified type        Prior to Admission medications    Medication Sig Start Date End Date Taking? Authorizing Provider   ALBUTEROL IN As Needed.    ProviderMarie MD   amLODIPine (NORVASC) 5 MG tablet Take 1 tablet by mouth Daily. 2/3/23   Anuel Pena MD   CBD (cannabidiol) oral oil Take  by mouth.    ProviderMarie MD   cetirizine (zyrTEC) 10 MG tablet Take 1 tablet by mouth Daily. Indications: Hayfever 22   Mango Kumar DO   Cholecalciferol 50 MCG (2000 UT) capsule Vitamin D3 2,000 unit oral capsule take 1 capsule by oral route daily   Suspended    ProviderMarie MD   DHA-EPA-FLAXSEED OIL-VITAMIN E PO Take  by mouth.    Marie Rankin MD   docusate sodium (COLACE) 100 MG capsule Take 1 capsule by mouth 2 (Two) Times a Day. Indications: Constipation 22   Mango Kumar DO   Eliquis 2.5 MG tablet tablet Take 1 tablet by mouth 2 (Two) Times a Day. 2/3/23   Anuel Pena MD   fluticasone (FLONASE) 50 MCG/ACT nasal spray into the nostril(s) as directed by provider.    Marie Rankin MD   ipratropium (ATROVENT) 0.06 % nasal spray 2 sprays into the nostril(s) as directed by provider 4  (Four) Times a Day. 1/14/22   Mango Kumar DO   metoprolol succinate XL (TOPROL-XL) 50 MG 24 hr tablet Take 1 tablet by mouth Daily. 7/18/22   Paxton Luis MD   Mirabegron ER (MYRBETRIQ) 25 MG tablet sustained-release 24 hour 24 hr tablet Take 1 tablet by mouth Daily. 6/2/22   Ermias Tang MD   multivitamin with minerals tablet tablet multivitamin oral tablet take 1 tablet by oral route daily   Suspended    Marie Rankin MD   pantoprazole (PROTONIX) 40 MG EC tablet Take 1 tablet by mouth Daily. 9/28/22   Mango Kumar DO   Refresh Celluvisc 1 % gel eye gel  10/14/22   Marie Rankin MD   Restasis MultiDose 0.05 % ophthalmic emulsion Daily. 8/6/21   Marie Rankin MD   spironolactone (ALDACTONE) 25 MG tablet Take 1 tablet by mouth Every Other Day. 2/3/23   Anuel Pena MD   tamsulosin (FLOMAX) 0.4 MG capsule 24 hr capsule TAKE 1 CAPSULE DAILY 1/2 HOUR FOLLOWING THE SAME MEAL EACH DAY 1/26/22   Ermias Tang MD   telmisartan (MICARDIS) 80 MG tablet Take 1 tablet by mouth Daily. 6/20/22   Paxton Luis MD   torsemide (Demadex) 20 MG tablet Take 1 tablet by mouth Daily. 1.5 tab daily except sunday 1/5/23   Anuel Pena MD        Review of Systems   Constitutional: Positive for fatigue. Negative for unexpected weight gain and unexpected weight loss.   HENT: Negative for hearing loss, sinus pressure and swollen glands.    Eyes: Negative for blurred vision and double vision.   Respiratory: Positive for shortness of breath. Negative for cough and wheezing.    Cardiovascular: Positive for leg swelling. Negative for chest pain and palpitations.   Gastrointestinal: Negative for nausea and vomiting.   Endocrine: Negative for cold intolerance, heat intolerance, polydipsia and polyuria.   Musculoskeletal: Positive for back pain. Negative for arthralgias.   Neurological: Negative for dizziness, light-headedness and headache.   Hematological: Does not bruise/bleed easily.  "       Symptom Course: Improved    Weight Trend: Stable     Objective     /56   Pulse 63   Ht 180.3 cm (71\")   Wt 93.9 kg (207 lb)   BMI 28.87 kg/m²       Physical Exam  Constitutional:       General: He is awake. He is not in acute distress.     Appearance: Normal appearance.   HENT:      Nose: No congestion.   Eyes:      Extraocular Movements: Extraocular movements intact.      Conjunctiva/sclera: Conjunctivae normal.      Pupils: Pupils are equal, round, and reactive to light.   Neck:      Thyroid: No thyromegaly.      Vascular: No carotid bruit or JVD.   Cardiovascular:      Rate and Rhythm: Normal rate and regular rhythm.      Chest Wall: PMI is not displaced.      Pulses: Normal pulses.      Heart sounds: S1 normal and S2 normal. Murmur heard.    Systolic murmur is present.    No friction rub. No gallop. No S3 or S4 sounds.   Pulmonary:      Effort: Pulmonary effort is normal.      Breath sounds: Normal breath sounds and air entry. No wheezing, rhonchi or rales.   Chest:      Chest wall: No tenderness.   Abdominal:      General: Bowel sounds are normal.      Palpations: Abdomen is soft. There is no mass.      Tenderness: There is no abdominal tenderness.      Comments: Negative for hepatomegaly and splenomegaly   Musculoskeletal:      Cervical back: Neck supple. No tenderness.      Right lower le+ Edema present.      Left lower le+ Edema present.   Skin:     General: Skin is warm and dry.      Findings: No petechiae or rash.      Nails: There is no clubbing.   Neurological:      General: No focal deficit present.      Mental Status: He is alert and oriented to person, place, and time.   Psychiatric:         Mood and Affect: Mood normal.         Behavior: Behavior is cooperative.       Lab Results   Component Value Date    PROBNP 1,233.0 2023     CMP    CMP 22   Glucose 107 (A) 125 (A)   BUN 32 (A) 27 (A)   Creatinine 1.60 (A) 1.71 (A)   eGFR 39.7 (A) 36.4 (A)   Sodium 142 " 144   Potassium 4.2 3.9   Chloride 104 104   Calcium 9.1 9.5   Total Protein 6.5 6.6   Albumin 3.90 3.9   Globulin 2.6 2.7   Total Bilirubin 0.4 0.4   Alkaline Phosphatase 62 68   AST (SGOT) 16 18   ALT (SGPT) 9 7   Albumin/Globulin Ratio 1.5 1.4   BUN/Creatinine Ratio 20.0 15.8   Anion Gap 10.7 8.3   (A) Abnormal value       Comments are available for some flowsheets but are not being displayed.           CBC w/diff    CBC w/Diff 2/24/23   WBC 8.17   RBC 4.34   Hemoglobin 14.1   Hematocrit 41.5   MCV 95.6   MCH 32.5   MCHC 34.0   RDW 12.5   Platelets 205   Neutrophil Rel % 61.9   Immature Granulocyte Rel % 0.2   Lymphocyte Rel % 24.8   Monocyte Rel % 10.8   Eosinophil Rel % 1.7   Basophil Rel % 0.6            Lipid Panel    Lipid Panel 6/14/22 2/24/23   Total Cholesterol 131 138   Triglycerides 71 108   HDL Cholesterol 41 41   VLDL Cholesterol 14 20   LDL Cholesterol  76 77   LDL/HDL Ratio 1.85 1.84            Lab Results   Component Value Date    TSH 3.850 02/24/2023    TSH 2.780 06/29/2021      Lab Results   Component Value Date    FREET4 1.31 02/24/2023    FREET4 1.16 06/29/2021      No results found for: DDIMERQUANT  Magnesium   Date Value Ref Range Status   02/24/2023 2.1 1.7 - 2.3 mg/dL Final      No results found for: DIGOXIN      Results for orders placed in visit on 08/03/22    Adult Transthoracic Echo Complete W/ Cont if Necessary Per Protocol    Interpretation Summary  · Estimated left ventricular EF was in agreement with the calculated left ventricular EF. Left ventricular ejection fraction appears to be 61 - 65%. Left ventricular systolic function is normal.  · Left ventricular diastolic function is consistent with (grade I) impaired relaxation.  · There is calcification of the aortic valve. There is mild aortic valve regurgitation noted but no significant stenosis  · Mild dilation of the aortic root is present.  · Estimated right ventricular systolic pressure from tricuspid regurgitation is mildly  elevated (35-45 mmHg).  · Mild pulmonary hypertension is present.              Result Review :                    ECG 12 Lead    Date/Time: 3/21/2023 10:12 AM  Performed by: Paxton Luis MD  Authorized by: Paxton Luis MD   Comments: Sinus rhythm, prolonged PA interval, right bundle branch block.  Compared to EKG of February 2022 the PA interval is longer.                Assessment and Plan        Diagnoses and all orders for this visit:    1. CHF (congestive heart failure), NYHA class II, chronic, diastolic (HCC) (Primary)  Assessment & Plan:  Mr. Carter has severe pedal edema with mild pulmonary hypertension and preserved left ventricle systolic function.  There is a possibility of all this being related to cardiac amyloidosis.  I am going to do the screening for his cardiac amyloidosis initiated.  For his severe pedal edema I am going to have him do restriction of salt and water reduce the dose of his amlodipine and start him on SGLT2 inhibitor.  In addition, I have made the following changes in his medications:    1.  Reduce the dose of amlodipine to half a tablet of 5 mg at bedtime.  2.  Start jardiance 10 mg once a day.  3.  Check heart rate and blood pressure twice a day and maintain a log along with daily  weight.  4 reduce salt and water intake.  5.  Do blood work 1 or 2 days before next appointment.    Orders:  -     CBC & Differential  -     Comprehensive Metabolic Panel  -     proBNP  -     T4, Free  -     TSH  -     Vitamin D 25-Hydroxy, D2 & D3; Future  -     Comprehensive Metabolic Panel; Future  -     proBNP; Future  -     Immunofixation Electrophoresis, Protein Electrophoresis & Immunoglobulin Free Light Chains (Kappa / Lambda), Serum; Future  -     High Sensitivity Troponin T; Future  -     Iron Profile; Future  -     Cardiac Amyloid Pyrophosphate (PYP) Scan; Future    2. Hyperlipidemia LDL goal <100    3. Essential hypertension  Assessment & Plan:  Based on his home blood pressure readings  his blood pressure is well regulated.  We will continue the telmisartan as is but reduce his amlodipine to see if it will help with his pedal edema      4. Pedal edema    Other orders  -     empagliflozin (Jardiance) 10 MG tablet tablet; Take 1 tablet by mouth Daily.  Dispense: 90 tablet; Refill: 3  -     NPO Diet  -     No Caffeine Prior to Cardiac Amyloid Pyrophosphate (PYP) Scan          Follow Up     Return for Follow-up in 3 to 4 weeks.  PYP scan.  Blood work 1 or 2 days before appointment.    Patient was given instructions and counseling regarding his condition or for health maintenance advice. Please see specific information pulled into the AVS if appropriate.       Total time spent, 42 minutes.This time includes time spent by me for the following activities:  Reviewing past records including hospitalizations, performing a cardiac focused examination and/or evaluation, educating and counselling the patient and family, independently interpreting results and diagnostic tests and communicating that information to patient and family, documenting information in the medical record, etc.     Electronically signed by Paxton Luis MD, 03/21/23, 10:07 AM EDT.

## 2023-03-21 NOTE — ASSESSMENT & PLAN NOTE
He has chronic kidney disease stage IIIb and will have to watch it closely while trying to get him to diurese.  We will monitor it closely

## 2023-03-21 NOTE — PATIENT INSTRUCTIONS
1.  Reduce the dose of amlodipine to half a tablet of 5 mg at bedtime.  2.  Start jardiance 10 mg once a day.  3.  Check heart rate and blood pressure twice a day and maintain a log along with daily  weight.  #4 reduce salt and water intake.  5.  Do blood work 1 or 2 days before next appointment.

## 2023-03-21 NOTE — ASSESSMENT & PLAN NOTE
Mr. Carter has severe pedal edema with mild pulmonary hypertension and preserved left ventricle systolic function.  There is a possibility of all this being related to cardiac amyloidosis.  I am going to do the screening for his cardiac amyloidosis initiated.  For his severe pedal edema I am going to have him do restriction of salt and water reduce the dose of his amlodipine and start him on SGLT2 inhibitor.  In addition, I have made the following changes in his medications:    1.  Reduce the dose of amlodipine to half a tablet of 5 mg at bedtime.  2.  Start jardiance 10 mg once a day.  3.  Check heart rate and blood pressure twice a day and maintain a log along with daily  weight.  4 reduce salt and water intake.  5.  Do blood work 1 or 2 days before next appointment.

## 2023-03-22 NOTE — PROGRESS NOTES
Please call her daughter on Monday.  Ask her how is he doing in terms of his pedal edema and blood pressure.  Let her know the labs are stable.  Kidney function is slightly improved.    Thanks

## 2023-03-23 LAB
ALBUMIN SERPL ELPH-MCNC: 3.3 G/DL (ref 2.9–4.4)
ALBUMIN/GLOB SERPL: 1.3 {RATIO} (ref 0.7–1.7)
ALPHA1 GLOB SERPL ELPH-MCNC: 0.3 G/DL (ref 0–0.4)
ALPHA2 GLOB SERPL ELPH-MCNC: 0.7 G/DL (ref 0.4–1)
B-GLOBULIN SERPL ELPH-MCNC: 1 G/DL (ref 0.7–1.3)
GAMMA GLOB SERPL ELPH-MCNC: 0.7 G/DL (ref 0.4–1.8)
GLOBULIN SER-MCNC: 2.6 G/DL (ref 2.2–3.9)
IGA SERPL-MCNC: 260 MG/DL (ref 61–437)
IGG SERPL-MCNC: 859 MG/DL (ref 603–1613)
IGM SERPL-MCNC: 49 MG/DL (ref 15–143)
INTERPRETATION SERPL IEP-IMP: ABNORMAL
KAPPA LC FREE SER-MCNC: 52 MG/L (ref 3.3–19.4)
KAPPA LC FREE/LAMBDA FREE SER: 1.97 {RATIO} (ref 0.26–1.65)
LABORATORY COMMENT REPORT: ABNORMAL
LAMBDA LC FREE SERPL-MCNC: 26.4 MG/L (ref 5.7–26.3)
M PROTEIN SERPL ELPH-MCNC: ABNORMAL G/DL
PROT SERPL-MCNC: 5.9 G/DL (ref 6–8.5)

## 2023-03-27 ENCOUNTER — LAB (OUTPATIENT)
Dept: LAB | Facility: HOSPITAL | Age: 88
End: 2023-03-27
Payer: MEDICARE

## 2023-03-27 DIAGNOSIS — I50.32 CHF (CONGESTIVE HEART FAILURE), NYHA CLASS II, CHRONIC, DIASTOLIC: ICD-10-CM

## 2023-03-27 DIAGNOSIS — I50.32 CHF (CONGESTIVE HEART FAILURE), NYHA CLASS II, CHRONIC, DIASTOLIC: Primary | ICD-10-CM

## 2023-03-27 LAB
ALBUMIN SERPL-MCNC: 3.8 G/DL (ref 3.5–5.2)
ALBUMIN/GLOB SERPL: 1.4 G/DL
ALP SERPL-CCNC: 67 U/L (ref 39–117)
ALT SERPL W P-5'-P-CCNC: 5 U/L (ref 1–41)
ANION GAP SERPL CALCULATED.3IONS-SCNC: 9 MMOL/L (ref 5–15)
AST SERPL-CCNC: 19 U/L (ref 1–40)
BILIRUB SERPL-MCNC: 0.4 MG/DL (ref 0–1.2)
BUN SERPL-MCNC: 23 MG/DL (ref 8–23)
BUN/CREAT SERPL: 15.3 (ref 7–25)
CALCIUM SPEC-SCNC: 9.2 MG/DL (ref 8.2–9.6)
CHLORIDE SERPL-SCNC: 105 MMOL/L (ref 98–107)
CO2 SERPL-SCNC: 29 MMOL/L (ref 22–29)
CREAT SERPL-MCNC: 1.5 MG/DL (ref 0.76–1.27)
EGFRCR SERPLBLD CKD-EPI 2021: 42.6 ML/MIN/1.73
GLOBULIN UR ELPH-MCNC: 2.8 GM/DL
GLUCOSE SERPL-MCNC: 103 MG/DL (ref 65–99)
NT-PROBNP SERPL-MCNC: 646.4 PG/ML (ref 0–1800)
POTASSIUM SERPL-SCNC: 4.2 MMOL/L (ref 3.5–5.2)
PROT SERPL-MCNC: 6.6 G/DL (ref 6–8.5)
SODIUM SERPL-SCNC: 143 MMOL/L (ref 136–145)

## 2023-03-27 PROCEDURE — 83880 ASSAY OF NATRIURETIC PEPTIDE: CPT

## 2023-03-27 PROCEDURE — 80053 COMPREHEN METABOLIC PANEL: CPT

## 2023-03-27 PROCEDURE — 36415 COLL VENOUS BLD VENIPUNCTURE: CPT

## 2023-03-27 PROCEDURE — 82652 VIT D 1 25-DIHYDROXY: CPT

## 2023-03-29 LAB — 1,25(OH)2D SERPL-MCNC: 26.9 PG/ML (ref 24.8–81.5)

## 2023-03-29 NOTE — PROGRESS NOTES
Please call patient's daughter.  Ask her how is he doing in terms of his pedal edema and blood pressure.  Labs are stable

## 2023-03-31 ENCOUNTER — HOSPITAL ENCOUNTER (OUTPATIENT)
Dept: CARDIOLOGY | Facility: HOSPITAL | Age: 88
Discharge: HOME | End: 2023-03-31
Admitting: INTERNAL MEDICINE
Payer: MEDICARE

## 2023-03-31 VITALS — WEIGHT: 207.23 LBS | HEIGHT: 71 IN | BODY MASS INDEX: 29.01 KG/M2

## 2023-03-31 DIAGNOSIS — I50.32 CHF (CONGESTIVE HEART FAILURE), NYHA CLASS II, CHRONIC, DIASTOLIC: ICD-10-CM

## 2023-03-31 LAB
MAXIMAL PREDICTED HEART RATE: 125 BPM
STRESS TARGET HR: 106 BPM

## 2023-03-31 PROCEDURE — 0 TECHNETIUM TC99M PYROPHOSPHATE: Performed by: INTERNAL MEDICINE

## 2023-03-31 PROCEDURE — 78803 RP LOCLZJ TUM SPECT 1 AREA: CPT | Performed by: INTERNAL MEDICINE

## 2023-03-31 PROCEDURE — 78803 RP LOCLZJ TUM SPECT 1 AREA: CPT

## 2023-03-31 PROCEDURE — A9538 TC99M PYROPHOSPHATE: HCPCS | Performed by: INTERNAL MEDICINE

## 2023-03-31 RX ADMIN — TECHNETIUM TC99M PYROPHOSPHATE 1 DOSE: 12 INJECTION INTRAVENOUS at 09:12

## 2023-04-12 DIAGNOSIS — N40.0 BENIGN PROSTATIC HYPERPLASIA, UNSPECIFIED WHETHER LOWER URINARY TRACT SYMPTOMS PRESENT: ICD-10-CM

## 2023-04-13 RX ORDER — TAMSULOSIN HYDROCHLORIDE 0.4 MG/1
CAPSULE ORAL
Qty: 90 CAPSULE | Refills: 3 | Status: SHIPPED | OUTPATIENT
Start: 2023-04-13 | End: 2023-04-14 | Stop reason: SDUPTHER

## 2023-04-14 ENCOUNTER — OFFICE VISIT (OUTPATIENT)
Dept: UROLOGY | Facility: CLINIC | Age: 88
End: 2023-04-14
Payer: MEDICARE

## 2023-04-14 VITALS — RESPIRATION RATE: 15 BRPM | HEIGHT: 70 IN | BODY MASS INDEX: 29.01 KG/M2 | WEIGHT: 202.6 LBS

## 2023-04-14 DIAGNOSIS — Z12.5 PROSTATE CANCER SCREENING: Primary | ICD-10-CM

## 2023-04-14 DIAGNOSIS — N40.0 BENIGN PROSTATIC HYPERPLASIA, UNSPECIFIED WHETHER LOWER URINARY TRACT SYMPTOMS PRESENT: ICD-10-CM

## 2023-04-14 DIAGNOSIS — N32.81 OAB (OVERACTIVE BLADDER): ICD-10-CM

## 2023-04-14 PROCEDURE — 1160F RVW MEDS BY RX/DR IN RCRD: CPT | Performed by: NURSE PRACTITIONER

## 2023-04-14 PROCEDURE — 99213 OFFICE O/P EST LOW 20 MIN: CPT | Performed by: NURSE PRACTITIONER

## 2023-04-14 PROCEDURE — 1159F MED LIST DOCD IN RCRD: CPT | Performed by: NURSE PRACTITIONER

## 2023-04-14 RX ORDER — TAMSULOSIN HYDROCHLORIDE 0.4 MG/1
1 CAPSULE ORAL DAILY
Qty: 90 CAPSULE | Refills: 4 | Status: SHIPPED | OUTPATIENT
Start: 2023-04-14

## 2023-04-14 NOTE — PROGRESS NOTES
Chief Complaint: Elevated PSA    Subjective         History of Present Illness  Vladimir Carter is a 89 y.o. male presents to [facility] to be seen for annal f/u prostate cancer.    history of prostate adenocarcinoma treated with brachytherapy in 2009.       Voiding ok.      No straining.     1 pad, minimal incontinence.        Nocturia X 0-2.      on Flomax 0.4 mg in conjunction with Myrbetriq 25 mg. No side effects.     No gross hematuria/burning/dysuria        Previous     10/20 cytology-negative        5/19 cystoscopywide caliber bulbar urethral stricture.  Prostate urethra has a lot of regrowth on the right side.  Essentially none on the left.  Heavily trabeculated bladder shows shallow bladder diverticulum posteriorly.  Just a very small amount of erythema tissue on posterior wall.  4/19 CT urogramnegative, delayed phase does not go all the way into the pelvis.     Does not have erections and is not worried about this.        1/22   1.4, 47     PSA         Patient has a history of Elkin 6 adenocarcinoma of the prostate with a PSA at that time of 5.2.  Patient underwent brachytherapy on 3/27/09.       1/22    <0.014  9/20    <0.01  2/18    <0.01  8/17    0.08  8/16    0.02  8/15     0.02  8/14     0.07  12/13   0.04  8/12    0.08  8/11    0.16        PVR      2/22  004  9/18  30  9/17   8    Objective     Past Medical History:   Diagnosis Date   • Abnormal bone density screening 09/29/2019    Spine-Osteopenia Hps-Osteoporosis   • Benign non-nodular prostatic hyperplasia with lower urinary tract symptoms 10/29/2018   • Bunion    • CAD (coronary artery disease)    • Callus    • Chronic pain syndrome    • CKD (chronic kidney disease) stage 3, GFR 30-59 ml/min    • GERD without esophagitis 09/16/2016   • H/O prostate cancer    • HTN (hypertension)    • Ingrown toenail    • Long term (current) use of anticoagulants    • Lupus anticoagulant disorder    • Moderate exercise     Active but  no formal exercise   • BENJA (obstructive sleep apnea)    • Osteoarthritis of left knee 04/22/2016    end-stage   • Osteoporosis     Osteoporosis in the hips/femur no fracture   • Overweight (BMI 25.0-29.9)    • Pedal edema 7/12/2021   • Pulmonary HTN    • Severe tricuspid regurgitation 05/2018    ECHO (TTE) Diastolic dysfunction, Pulmonary HTN and Severe Tricuspid regurgitation   • Tinea unguium 01/24/2019       Past Surgical History:   Procedure Laterality Date   • CHOLECYSTECTOMY     • COLONOSCOPY     • CYSTOSCOPY     • HERNIA REPAIR     • OTHER SURGICAL HISTORY      Brachytherapy   • REPLACEMENT TOTAL KNEE Left 05/10/2017   • REPLACEMENT TOTAL KNEE Right    • WRIST SURGERY           Current Outpatient Medications:   •  ALBUTEROL IN, As Needed., Disp: , Rfl:   •  amLODIPine (NORVASC) 5 MG tablet, Take 1 tablet by mouth Daily., Disp: 90 tablet, Rfl: 3  •  CBD (cannabidiol) oral oil, Take  by mouth., Disp: , Rfl:   •  cetirizine (zyrTEC) 10 MG tablet, Take 1 tablet by mouth Daily. Indications: Hayfever, Disp: 90 tablet, Rfl: 3  •  Cholecalciferol 50 MCG (2000 UT) capsule, Vitamin D3 2,000 unit oral capsule take 1 capsule by oral route daily   Suspended, Disp: , Rfl:   •  DHA-EPA-FLAXSEED OIL-VITAMIN E PO, Take  by mouth., Disp: , Rfl:   •  docusate sodium (COLACE) 100 MG capsule, Take 1 capsule by mouth 2 (Two) Times a Day. Indications: Constipation, Disp: 180 capsule, Rfl: 3  •  Eliquis 2.5 MG tablet tablet, Take 1 tablet by mouth 2 (Two) Times a Day., Disp: 180 tablet, Rfl: 3  •  empagliflozin (Jardiance) 10 MG tablet tablet, Take 1 tablet by mouth Daily., Disp: 90 tablet, Rfl: 3  •  fluticasone (FLONASE) 50 MCG/ACT nasal spray, into the nostril(s) as directed by provider., Disp: , Rfl:   •  ipratropium (ATROVENT) 0.06 % nasal spray, 2 sprays into the nostril(s) as directed by provider 4 (Four) Times a Day., Disp: 15 mL, Rfl: 5  •  metoprolol succinate XL (TOPROL-XL) 50 MG 24 hr tablet, Take 1 tablet by mouth  "Daily., Disp: 90 tablet, Rfl: 3  •  Mirabegron ER (MYRBETRIQ) 25 MG tablet sustained-release 24 hour 24 hr tablet, Take 1 tablet by mouth Daily., Disp: 90 tablet, Rfl: 4  •  multivitamin with minerals tablet tablet, multivitamin oral tablet take 1 tablet by oral route daily   Suspended, Disp: , Rfl:   •  pantoprazole (PROTONIX) 40 MG EC tablet, Take 1 tablet by mouth Daily., Disp: 90 tablet, Rfl: 1  •  Refresh Celluvisc 1 % gel eye gel, , Disp: , Rfl:   •  Restasis MultiDose 0.05 % ophthalmic emulsion, Daily., Disp: , Rfl:   •  spironolactone (ALDACTONE) 25 MG tablet, Take 1 tablet by mouth Every Other Day., Disp: 45 tablet, Rfl: 3  •  tamsulosin (FLOMAX) 0.4 MG capsule 24 hr capsule, Take 1 capsule by mouth Daily., Disp: 90 capsule, Rfl: 4  •  telmisartan (MICARDIS) 80 MG tablet, Take 1 tablet by mouth Daily., Disp: 90 tablet, Rfl: 3  •  torsemide (Demadex) 20 MG tablet, Take 1 tablet by mouth Daily. 1.5 tab daily except , Disp: 90 tablet, Rfl: 3    No Known Allergies     Family History   Problem Relation Age of Onset   • Heart disease Mother    • Hypertension Mother    • Breast cancer Daughter         Breast Neoplasm, Malignant   • Cancer Daughter         Unspecified   • Diabetes Daughter         Unspecified type       Social History     Socioeconomic History   • Marital status:    Tobacco Use   • Smoking status: Former     Packs/day: 2.00     Years: 15.00     Pack years: 30.00     Types: Cigarettes, Cigars     Start date: 1949     Quit date: 2009     Years since quittin.6   • Smokeless tobacco: Never   Vaping Use   • Vaping Use: Never used   Substance and Sexual Activity   • Alcohol use: Never   • Drug use: Never   • Sexual activity: Not Currently     Partners: Female     Birth control/protection: None       Vital Signs:   Resp 15   Ht 177.8 cm (70\")   Wt 91.9 kg (202 lb 9.6 oz)   BMI 29.07 kg/m²      Physical Exam     Result Review :   The following data was reviewed by: Verna TOVAR" FIORELLA Bautista on 04/14/2023:  Results for orders placed or performed during the hospital encounter of 03/31/23   Cardiac Amyloid Pyrophosphate (PYP) Scan   Result Value Ref Range    Target HR (85%) 106 bpm    Max. Pred. HR (100%) 125 bpm      PSA        2/24/2023    10:34   PSA   PSA <0.014           Procedures        Assessment and Plan    Diagnoses and all orders for this visit:    1. Prostate cancer screening (Primary)  -     PSA Screen; Future    2. OAB (overactive bladder)  -     Mirabegron ER (MYRBETRIQ) 25 MG tablet sustained-release 24 hour 24 hr tablet; Take 1 tablet by mouth Daily.  Dispense: 90 tablet; Refill: 4    3. Benign prostatic hyperplasia, unspecified whether lower urinary tract symptoms present  -     tamsulosin (FLOMAX) 0.4 MG capsule 24 hr capsule; Take 1 capsule by mouth Daily.  Dispense: 90 capsule; Refill: 4      We will refill meds for 1 year.     He will continue to follow up annually with a PSA.        I spent 10 minutes caring for Vladimir on this date of service. This time includes time spent by me in the following activities:reviewing tests, obtaining and/or reviewing a separately obtained history, performing a medically appropriate examination and/or evaluation , counseling and educating the patient/family/caregiver, ordering medications, tests, or procedures, and documenting information in the medical record  Follow Up   Return in about 1 year (around 4/14/2024) for annual f/u with PSA.  Patient was given instructions and counseling regarding his condition or for health maintenance advice. Please see specific information pulled into the AVS if appropriate.         This document has been electronically signed by FIORELLA Garcia  April 14, 2023 08:14 EDT

## 2023-04-17 ENCOUNTER — OFFICE VISIT (OUTPATIENT)
Dept: CARDIOLOGY | Facility: CLINIC | Age: 88
End: 2023-04-17
Payer: MEDICARE

## 2023-04-17 VITALS
SYSTOLIC BLOOD PRESSURE: 171 MMHG | DIASTOLIC BLOOD PRESSURE: 62 MMHG | HEART RATE: 55 BPM | HEIGHT: 70 IN | WEIGHT: 203 LBS | BODY MASS INDEX: 29.06 KG/M2

## 2023-04-17 DIAGNOSIS — E78.5 HYPERLIPIDEMIA LDL GOAL <100: Chronic | ICD-10-CM

## 2023-04-17 DIAGNOSIS — N18.32 STAGE 3B CHRONIC KIDNEY DISEASE: ICD-10-CM

## 2023-04-17 DIAGNOSIS — R00.1 BRADYCARDIA, SINUS: ICD-10-CM

## 2023-04-17 DIAGNOSIS — E66.3 OVERWEIGHT (BMI 25.0-29.9): Chronic | ICD-10-CM

## 2023-04-17 DIAGNOSIS — I50.32 CHF (CONGESTIVE HEART FAILURE), NYHA CLASS II, CHRONIC, DIASTOLIC: Primary | ICD-10-CM

## 2023-04-17 LAB
ALBUMIN SERPL-MCNC: 3.7 G/DL (ref 3.5–5.2)
ALBUMIN/GLOB SERPL: 1.3 G/DL
ALP SERPL-CCNC: 67 U/L (ref 39–117)
ALT SERPL W P-5'-P-CCNC: 8 U/L (ref 1–41)
ANION GAP SERPL CALCULATED.3IONS-SCNC: 9.4 MMOL/L (ref 5–15)
AST SERPL-CCNC: 18 U/L (ref 1–40)
BILIRUB SERPL-MCNC: 0.3 MG/DL (ref 0–1.2)
BUN SERPL-MCNC: 21 MG/DL (ref 8–23)
BUN/CREAT SERPL: 14.9 (ref 7–25)
CALCIUM SPEC-SCNC: 9.2 MG/DL (ref 8.2–9.6)
CHLORIDE SERPL-SCNC: 106 MMOL/L (ref 98–107)
CO2 SERPL-SCNC: 28.6 MMOL/L (ref 22–29)
CREAT SERPL-MCNC: 1.41 MG/DL (ref 0.76–1.27)
EGFRCR SERPLBLD CKD-EPI 2021: 45.9 ML/MIN/1.73
GLOBULIN UR ELPH-MCNC: 2.8 GM/DL
GLUCOSE SERPL-MCNC: 98 MG/DL (ref 65–99)
MAGNESIUM SERPL-MCNC: 2.1 MG/DL (ref 1.7–2.3)
POTASSIUM SERPL-SCNC: 4.4 MMOL/L (ref 3.5–5.2)
PROT SERPL-MCNC: 6.5 G/DL (ref 6–8.5)
SODIUM SERPL-SCNC: 144 MMOL/L (ref 136–145)

## 2023-04-17 PROCEDURE — 80053 COMPREHEN METABOLIC PANEL: CPT | Performed by: INTERNAL MEDICINE

## 2023-04-17 PROCEDURE — 83735 ASSAY OF MAGNESIUM: CPT | Performed by: INTERNAL MEDICINE

## 2023-04-17 NOTE — PROGRESS NOTES
Office Visit    Chief Complaint  CHF, NYHA class II, chronic, diastolic    Subjective            Vladimir Carter presents to [facility] [place]  History of Present Illness  Mr. Carter is a 89 years old gentleman with hypertension diastolic heart failure pedal edema who is doing better.  His pedal edema is gradually getting better but still present.  Its not as hard on his legs as it used to be according to him.  He has lost about 4 to 5 pounds.  He denies chest pain palpitations dizziness syncope      Past Medical History:   Diagnosis Date   • Abnormal bone density screening 09/29/2019    Spine-Osteopenia Hps-Osteoporosis   • Benign non-nodular prostatic hyperplasia with lower urinary tract symptoms 10/29/2018   • Bunion    • CAD (coronary artery disease)    • Callus    • Chronic pain syndrome    • CKD (chronic kidney disease) stage 3, GFR 30-59 ml/min    • GERD without esophagitis 09/16/2016   • H/O prostate cancer    • HTN (hypertension)    • Ingrown toenail    • Long term (current) use of anticoagulants    • Lupus anticoagulant disorder    • Moderate exercise     Active but no formal exercise   • BENJA (obstructive sleep apnea)    • Osteoarthritis of left knee 04/22/2016    end-stage   • Osteoporosis     Osteoporosis in the hips/femur no fracture   • Overweight (BMI 25.0-29.9)    • Pedal edema 7/12/2021   • Pulmonary HTN    • Severe tricuspid regurgitation 05/2018    ECHO (TTE) Diastolic dysfunction, Pulmonary HTN and Severe Tricuspid regurgitation   • Tinea unguium 01/24/2019       No Known Allergies     Past Surgical History:   Procedure Laterality Date   • CHOLECYSTECTOMY     • COLONOSCOPY     • CYSTOSCOPY     • HERNIA REPAIR     • OTHER SURGICAL HISTORY      Brachytherapy   • REPLACEMENT TOTAL KNEE Left 05/10/2017   • REPLACEMENT TOTAL KNEE Right    • WRIST SURGERY          Social History     Tobacco Use   • Smoking status: Former     Packs/day: 2.00     Years: 15.00     Pack  years: 30.00     Types: Cigarettes, Cigars     Start date: 1949     Quit date: 2009     Years since quittin.6   • Smokeless tobacco: Never   Vaping Use   • Vaping Use: Never used   Substance Use Topics   • Alcohol use: Never   • Drug use: Never       Family History   Problem Relation Age of Onset   • Heart disease Mother    • Hypertension Mother    • Breast cancer Daughter         Breast Neoplasm, Malignant   • Cancer Daughter         Unspecified   • Diabetes Daughter         Unspecified type        Prior to Admission medications    Medication Sig Start Date End Date Taking? Authorizing Provider   ALBUTEROL IN As Needed.    ProviderMarie MD   amLODIPine (NORVASC) 5 MG tablet Take 1 tablet by mouth Daily. 2/3/23   Anuel Pena MD   CBD (cannabidiol) oral oil Take  by mouth.    ProviderMarie MD   cetirizine (zyrTEC) 10 MG tablet Take 1 tablet by mouth Daily. Indications: Hayfever 22   Mango Kumar DO   Cholecalciferol 50 MCG (2000 UT) capsule Vitamin D3 2,000 unit oral capsule take 1 capsule by oral route daily   Suspended    ProviderMarie MD   DHA-EPA-FLAXSEED OIL-VITAMIN E PO Take  by mouth.    ProviderMarie MD   docusate sodium (COLACE) 100 MG capsule Take 1 capsule by mouth 2 (Two) Times a Day. Indications: Constipation 22   Mango Kumar DO   Eliquis 2.5 MG tablet tablet Take 1 tablet by mouth 2 (Two) Times a Day. 2/3/23   Anuel Pena MD   empagliflozin (Jardiance) 10 MG tablet tablet Take 1 tablet by mouth Daily. 3/21/23   Paxton Luis MD   fluticasone (FLONASE) 50 MCG/ACT nasal spray into the nostril(s) as directed by provider.    Marie Rankin MD   ipratropium (ATROVENT) 0.06 % nasal spray 2 sprays into the nostril(s) as directed by provider 4 (Four) Times a Day. 22   Mango Kumar DO   metoprolol succinate XL (TOPROL-XL) 50 MG 24 hr tablet Take 1 tablet by mouth Daily. 22   Paxton Luis MD   Mirabegron ER  "(MYRBETRIQ) 25 MG tablet sustained-release 24 hour 24 hr tablet Take 1 tablet by mouth Daily. 4/14/23   Verna Bautista APRN   multivitamin with minerals tablet tablet multivitamin oral tablet take 1 tablet by oral route daily   Suspended    Marie Rankin MD   pantoprazole (PROTONIX) 40 MG EC tablet Take 1 tablet by mouth Daily. 9/28/22   Mango Kumar DO   Refresh Celluvisc 1 % gel eye gel  10/14/22   Marie Rankin MD   Restasis MultiDose 0.05 % ophthalmic emulsion Daily. 8/6/21   Marie Rankin MD   spironolactone (ALDACTONE) 25 MG tablet Take 1 tablet by mouth Every Other Day. 2/3/23   Anuel Pena MD   tamsulosin (FLOMAX) 0.4 MG capsule 24 hr capsule Take 1 capsule by mouth Daily. 4/14/23   Verna Bautista APRN   telmisartan (MICARDIS) 80 MG tablet Take 1 tablet by mouth Daily. 6/20/22   Paxton Luis MD   torsemide (Demadex) 20 MG tablet Take 1 tablet by mouth Daily. 1.5 tab daily except sunday 1/5/23   Anuel Pena MD        Review of Systems   Constitutional: Negative for fatigue.   Respiratory: Positive for shortness of breath. Negative for cough.    Cardiovascular: Positive for leg swelling. Negative for chest pain and palpitations.   Neurological: Negative for dizziness.        Symptom Course: Been Unchanged    Weight Trend: Stable     Objective     /62   Pulse 55   Ht 177.8 cm (70\")   Wt 92.1 kg (203 lb)   BMI 29.13 kg/m²       Physical Exam  Constitutional:       General: He is awake.      Appearance: Normal appearance.   Neck:      Thyroid: No thyromegaly.      Vascular: No carotid bruit or JVD.   Cardiovascular:      Rate and Rhythm: Normal rate and regular rhythm.      Chest Wall: PMI is not displaced.      Pulses: Normal pulses.      Heart sounds: S1 normal and S2 normal. Murmur heard.    Systolic murmur is present.    No friction rub. No gallop. No S3 or S4 sounds.   Pulmonary:      Effort: Pulmonary effort is normal.      Breath sounds: " Normal breath sounds and air entry. No wheezing, rhonchi or rales.   Abdominal:      General: Bowel sounds are normal.      Palpations: Abdomen is soft. There is no mass.      Tenderness: There is no abdominal tenderness.   Musculoskeletal:      Cervical back: Neck supple.      Right lower le+ Pitting Edema present.      Left lower le+ Edema present.   Neurological:      Mental Status: He is alert and oriented to person, place, and time.   Psychiatric:         Mood and Affect: Mood normal.         Behavior: Behavior is cooperative.       Lab Results   Component Value Date    PROBNP 646.4 2023    PROBNP 982.0 2023    PROBNP 1,233.0 2023     CMP        3/21/2023    09:18 3/27/2023    08:36 2023    09:02   CMP   Glucose 113   103   98     BUN 21   23   21     Creatinine 1.38   1.50   1.41     EGFR 47.1   42.6   45.9     Sodium 147   143   144     Potassium 4.3   4.2   4.4     Chloride 107   105   106     Calcium 9.3   9.2   9.2     Total Protein 5.9       Total Protein 6.3   6.6   6.5     Albumin 3.7      3.3   3.8   3.7     Globulin 2.6       Globulin 2.6   2.8   2.8     Total Bilirubin 0.4   0.4   0.3     Alkaline Phosphatase 67   67   67     AST (SGOT) 16   19   18     ALT (SGPT) 7   5   8     Albumin/Globulin Ratio 1.4   1.4   1.3     BUN/Creatinine Ratio 15.2   15.3   14.9     Anion Gap 11.2   9.0   9.4       CBC w/diff        2023    10:34 3/21/2023    09:18   CBC w/Diff   WBC 8.17   8.27     RBC 4.34   4.38     Hemoglobin 14.1   14.1     Hematocrit 41.5   44.1     MCV 95.6   100.7     MCH 32.5   32.2     MCHC 34.0   32.0     RDW 12.5   13.3     Platelets 205   212     Neutrophil Rel % 61.9   53.5     Immature Granulocyte Rel % 0.2   0.4     Lymphocyte Rel % 24.8   30.7     Monocyte Rel % 10.8   11.9     Eosinophil Rel % 1.7   2.9     Basophil Rel % 0.6   0.6        Lipid Panel        2022    06:16 2023    10:34   Lipid Panel   Total Cholesterol 131   138      Triglycerides 71   108     HDL Cholesterol 41   41     VLDL Cholesterol 14   20     LDL Cholesterol  76   77     LDL/HDL Ratio 1.85   1.84        Lab Results   Component Value Date    TSH 3.270 03/21/2023    TSH 3.850 02/24/2023    TSH 2.780 06/29/2021      Lab Results   Component Value Date    FREET4 1.29 03/21/2023    FREET4 1.31 02/24/2023    FREET4 1.16 06/29/2021      No results found for: DDIMERQUANT  Magnesium   Date Value Ref Range Status   04/17/2023 2.1 1.7 - 2.3 mg/dL Final      No results found for: DIGOXIN              Result Review :                           Assessment and Plan        Diagnoses and all orders for this visit:    1. CHF (congestive heart failure), NYHA class II, chronic, diastolic (Primary)  Assessment & Plan:  Mr. Carter although doing better from a CHF standpoint with resolution of his shortness of breath, continues to have pedal edema.  I would like to see his renal function before increasing his diuretic.  I am therefore going to draw blood today with stat results and then call his daughter and let her know what to change in terms of his diuretic.    His renal function is stable with a creatinine of 1.5.  I am going to increase his spironolactone to 50 mg once a day except Sunday and have him draw blood in 2 weeks to make sure he does not get hyperkalemia.  He has been advised a low potassium diet    Orders:  -     Comprehensive Metabolic Panel  -     Magnesium; Future  -     Comprehensive Metabolic Panel; Future  -     proBNP; Future  -     Magnesium    2. Hyperlipidemia LDL goal <100    3. Bradycardia, sinus    4. Overweight (BMI 25.0-29.9)    5. Stage 3b chronic kidney disease  Assessment & Plan:  He has stage IIIb chronic kidney disease and will need close monitoring in light of all the diuretics he is getting along with the ARB drug.    Orders:  -     Comprehensive Metabolic Panel  -     Magnesium; Future  -     Comprehensive Metabolic Panel; Future  -     proBNP; Future  -      Magnesium    Other orders  -     [facility] - CARDIOLOGY          Follow Up     Return in about 6 years (around 6/5/2029).    Patient was given instructions and counseling regarding his condition or for health maintenance advice. Please see specific information pulled into the AVS if appropriate.     Paxton Luis MD  04/18/23 08:07 EDT

## 2023-04-17 NOTE — ASSESSMENT & PLAN NOTE
Mr. Carter although doing better from a CHF standpoint with resolution of his shortness of breath, continues to have pedal edema.  I would like to see his renal function before increasing his diuretic.  I am therefore going to draw blood today with stat results and then call his daughter and let her know what to change in terms of his diuretic.    His renal function is stable with a creatinine of 1.5.  I am going to increase his spironolactone to 50 mg once a day except Sunday and have him draw blood in 2 weeks to make sure he does not get hyperkalemia.  He has been advised a low potassium diet

## 2023-04-17 NOTE — ASSESSMENT & PLAN NOTE
He has stage IIIb chronic kidney disease and will need close monitoring in light of all the diuretics he is getting along with the ARB drug.

## 2023-04-18 ENCOUNTER — TELEPHONE (OUTPATIENT)
Dept: CARDIOLOGY | Facility: CLINIC | Age: 88
End: 2023-04-18
Payer: MEDICARE

## 2023-04-18 NOTE — TELEPHONE ENCOUNTER
Per Dr. Luis called patient and informed them to increase spironolactone to 50 mg daily every day except on sunday. As well as get labs drawn in 2 weeks. Left a voicemail for patient to RTC.

## 2023-04-28 ENCOUNTER — LAB (OUTPATIENT)
Dept: LAB | Facility: HOSPITAL | Age: 88
End: 2023-04-28
Payer: MEDICARE

## 2023-04-28 DIAGNOSIS — N18.32 STAGE 3B CHRONIC KIDNEY DISEASE: ICD-10-CM

## 2023-04-28 DIAGNOSIS — E66.3 OVERWEIGHT (BMI 25.0-29.9): Chronic | ICD-10-CM

## 2023-04-28 DIAGNOSIS — I50.32 CHF (CONGESTIVE HEART FAILURE), NYHA CLASS II, CHRONIC, DIASTOLIC: ICD-10-CM

## 2023-04-28 LAB
ALBUMIN SERPL-MCNC: 3.9 G/DL (ref 3.5–5.2)
ALBUMIN/GLOB SERPL: 1.6 G/DL
ALP SERPL-CCNC: 69 U/L (ref 39–117)
ALT SERPL W P-5'-P-CCNC: 6 U/L (ref 1–41)
ANION GAP SERPL CALCULATED.3IONS-SCNC: 12.3 MMOL/L (ref 5–15)
AST SERPL-CCNC: 15 U/L (ref 1–40)
BILIRUB SERPL-MCNC: 0.4 MG/DL (ref 0–1.2)
BUN SERPL-MCNC: 31 MG/DL (ref 8–23)
BUN/CREAT SERPL: 15.3 (ref 7–25)
CALCIUM SPEC-SCNC: 9.2 MG/DL (ref 8.2–9.6)
CHLORIDE SERPL-SCNC: 100 MMOL/L (ref 98–107)
CO2 SERPL-SCNC: 27.7 MMOL/L (ref 22–29)
CREAT SERPL-MCNC: 2.02 MG/DL (ref 0.76–1.27)
EGFRCR SERPLBLD CKD-EPI 2021: 29.8 ML/MIN/1.73
GLOBULIN UR ELPH-MCNC: 2.5 GM/DL
GLUCOSE SERPL-MCNC: 96 MG/DL (ref 65–99)
MAGNESIUM SERPL-MCNC: 2.1 MG/DL (ref 1.7–2.3)
POTASSIUM SERPL-SCNC: 4.1 MMOL/L (ref 3.5–5.2)
PROT SERPL-MCNC: 6.4 G/DL (ref 6–8.5)
SODIUM SERPL-SCNC: 140 MMOL/L (ref 136–145)

## 2023-04-28 PROCEDURE — 83735 ASSAY OF MAGNESIUM: CPT

## 2023-04-28 PROCEDURE — 80053 COMPREHEN METABOLIC PANEL: CPT

## 2023-04-28 PROCEDURE — 83880 ASSAY OF NATRIURETIC PEPTIDE: CPT

## 2023-04-28 PROCEDURE — 36415 COLL VENOUS BLD VENIPUNCTURE: CPT

## 2023-04-29 LAB — NT-PROBNP SERPL-MCNC: 745 PG/ML (ref 0–1800)

## 2023-05-02 DIAGNOSIS — I50.32 CHF (CONGESTIVE HEART FAILURE), NYHA CLASS II, CHRONIC, DIASTOLIC: Primary | ICD-10-CM

## 2023-05-02 NOTE — PROGRESS NOTES
Ask his daughter how is his leg swelling.  Decrease his torsemide to 1 tablet every day including Sunday.  Order a BMP in 2 weeks

## 2023-05-12 ENCOUNTER — LAB (OUTPATIENT)
Dept: LAB | Facility: HOSPITAL | Age: 88
End: 2023-05-12
Payer: MEDICARE

## 2023-05-12 DIAGNOSIS — I50.32 CHF (CONGESTIVE HEART FAILURE), NYHA CLASS II, CHRONIC, DIASTOLIC: ICD-10-CM

## 2023-05-12 DIAGNOSIS — N18.32 STAGE 3B CHRONIC KIDNEY DISEASE: ICD-10-CM

## 2023-05-12 LAB
ALBUMIN SERPL-MCNC: 3.5 G/DL (ref 3.5–5.2)
ALBUMIN/GLOB SERPL: 1.2 G/DL
ALP SERPL-CCNC: 72 U/L (ref 39–117)
ALT SERPL W P-5'-P-CCNC: 6 U/L (ref 1–41)
ANION GAP SERPL CALCULATED.3IONS-SCNC: 10.4 MMOL/L (ref 5–15)
AST SERPL-CCNC: 14 U/L (ref 1–40)
BILIRUB SERPL-MCNC: 0.5 MG/DL (ref 0–1.2)
BUN SERPL-MCNC: 25 MG/DL (ref 8–23)
BUN/CREAT SERPL: 14.6 (ref 7–25)
CALCIUM SPEC-SCNC: 9.3 MG/DL (ref 8.2–9.6)
CHLORIDE SERPL-SCNC: 104 MMOL/L (ref 98–107)
CO2 SERPL-SCNC: 24.6 MMOL/L (ref 22–29)
CREAT SERPL-MCNC: 1.71 MG/DL (ref 0.76–1.27)
EGFRCR SERPLBLD CKD-EPI 2021: 36.4 ML/MIN/1.73
GLOBULIN UR ELPH-MCNC: 3 GM/DL
GLUCOSE SERPL-MCNC: 110 MG/DL (ref 65–99)
MAGNESIUM SERPL-MCNC: 2 MG/DL (ref 1.7–2.3)
NT-PROBNP SERPL-MCNC: 743 PG/ML (ref 0–1800)
POTASSIUM SERPL-SCNC: 4.5 MMOL/L (ref 3.5–5.2)
PROT SERPL-MCNC: 6.5 G/DL (ref 6–8.5)
SODIUM SERPL-SCNC: 139 MMOL/L (ref 136–145)

## 2023-05-12 PROCEDURE — 83735 ASSAY OF MAGNESIUM: CPT

## 2023-05-12 PROCEDURE — 83880 ASSAY OF NATRIURETIC PEPTIDE: CPT

## 2023-05-12 PROCEDURE — 36415 COLL VENOUS BLD VENIPUNCTURE: CPT

## 2023-05-12 PROCEDURE — 80053 COMPREHEN METABOLIC PANEL: CPT

## 2023-05-22 ENCOUNTER — TELEPHONE (OUTPATIENT)
Dept: UROLOGY | Facility: CLINIC | Age: 88
End: 2023-05-22
Payer: MEDICARE

## 2023-05-22 ENCOUNTER — LAB (OUTPATIENT)
Dept: LAB | Facility: HOSPITAL | Age: 88
End: 2023-05-22
Payer: MEDICARE

## 2023-05-22 DIAGNOSIS — J30.1 SEASONAL ALLERGIC RHINITIS DUE TO POLLEN: ICD-10-CM

## 2023-05-22 DIAGNOSIS — R30.0 DYSURIA: ICD-10-CM

## 2023-05-22 DIAGNOSIS — R00.1 BRADYCARDIA, SINUS: ICD-10-CM

## 2023-05-22 DIAGNOSIS — R30.0 DYSURIA: Primary | ICD-10-CM

## 2023-05-22 DIAGNOSIS — K59.01 SLOW TRANSIT CONSTIPATION: ICD-10-CM

## 2023-05-22 DIAGNOSIS — I10 ESSENTIAL HYPERTENSION: Chronic | ICD-10-CM

## 2023-05-22 DIAGNOSIS — I10 ESSENTIAL HYPERTENSION: ICD-10-CM

## 2023-05-22 PROCEDURE — 87086 URINE CULTURE/COLONY COUNT: CPT

## 2023-05-22 RX ORDER — PANTOPRAZOLE SODIUM 40 MG/1
40 TABLET, DELAYED RELEASE ORAL DAILY
Qty: 90 TABLET | Refills: 1 | Status: SHIPPED | OUTPATIENT
Start: 2023-05-22

## 2023-05-22 RX ORDER — DOCUSATE SODIUM 100 MG/1
100 CAPSULE, LIQUID FILLED ORAL 2 TIMES DAILY
Qty: 180 CAPSULE | Refills: 3 | Status: SHIPPED | OUTPATIENT
Start: 2023-05-22

## 2023-05-22 RX ORDER — CETIRIZINE HYDROCHLORIDE 10 MG/1
10 TABLET ORAL DAILY
Qty: 90 TABLET | Refills: 3 | Status: SHIPPED | OUTPATIENT
Start: 2023-05-22

## 2023-05-22 NOTE — TELEPHONE ENCOUNTER
PATIENT'S DAUGHTER, TORI, CALLED.  PATIENT HAS BURNING WITH URINATION, AND MILKY URINE.  DAUGHTER WANTS TO KNOW IF HE CAN GET A URINE CULTURE DONE.

## 2023-05-22 NOTE — TELEPHONE ENCOUNTER
I CALLED THE PATIENT'S DAUGHTER, TORI, AND TOLD HER THE ORDER HAS BEEN PLACED FOR A URINE CULTURE, AND HE MAY GO TO ANY [facility] AND TO HAVE DONE.

## 2023-05-23 DIAGNOSIS — I10 ESSENTIAL HYPERTENSION: Chronic | ICD-10-CM

## 2023-05-23 LAB — BACTERIA SPEC AEROBE CULT: NO GROWTH

## 2023-05-23 RX ORDER — TORSEMIDE 20 MG/1
20 TABLET ORAL DAILY
Qty: 90 TABLET | Refills: 3 | Status: SHIPPED | OUTPATIENT
Start: 2023-05-23 | End: 2023-05-23

## 2023-05-23 RX ORDER — METOPROLOL SUCCINATE 50 MG/1
50 TABLET, EXTENDED RELEASE ORAL DAILY
Qty: 90 TABLET | Refills: 3 | Status: SHIPPED | OUTPATIENT
Start: 2023-05-23

## 2023-05-23 RX ORDER — SPIRONOLACTONE 25 MG/1
25 TABLET ORAL EVERY OTHER DAY
Qty: 90 TABLET | Refills: 3 | Status: SHIPPED | OUTPATIENT
Start: 2023-05-23 | End: 2023-05-23

## 2023-05-23 RX ORDER — SPIRONOLACTONE 25 MG/1
TABLET ORAL
Qty: 180 TABLET | Refills: 3 | Status: SHIPPED | OUTPATIENT
Start: 2023-05-23 | End: 2023-05-23

## 2023-05-23 RX ORDER — SPIRONOLACTONE 25 MG/1
25 TABLET ORAL EVERY OTHER DAY
Qty: 45 TABLET | Refills: 3 | Status: SHIPPED | OUTPATIENT
Start: 2023-05-23 | End: 2023-05-23 | Stop reason: SDUPTHER

## 2023-05-23 RX ORDER — SPIRONOLACTONE 50 MG/1
50 TABLET, FILM COATED ORAL DAILY
Qty: 90 TABLET | Refills: 3 | Status: SHIPPED | OUTPATIENT
Start: 2023-05-23

## 2023-05-23 RX ORDER — TELMISARTAN 80 MG/1
80 TABLET ORAL DAILY
Qty: 90 TABLET | Refills: 3 | Status: SHIPPED | OUTPATIENT
Start: 2023-05-23

## 2023-05-23 RX ORDER — TORSEMIDE 20 MG/1
20 TABLET ORAL DAILY
Qty: 90 TABLET | Refills: 3 | Status: SHIPPED | OUTPATIENT
Start: 2023-05-23

## 2023-05-23 RX ORDER — APIXABAN 2.5 MG/1
2.5 TABLET, FILM COATED ORAL 2 TIMES DAILY
Qty: 180 TABLET | Refills: 3 | Status: SHIPPED | OUTPATIENT
Start: 2023-05-23

## 2023-05-23 RX ORDER — AMLODIPINE BESYLATE 5 MG/1
5 TABLET ORAL DAILY
Qty: 90 TABLET | Refills: 3 | Status: SHIPPED | OUTPATIENT
Start: 2023-05-23

## 2023-05-23 NOTE — TELEPHONE ENCOUNTER
Daughter called requesting refills on attached medications.  Chart review completed, refills sent.

## 2023-05-30 ENCOUNTER — OFFICE VISIT (OUTPATIENT)
Dept: UROLOGY | Facility: CLINIC | Age: 88
End: 2023-05-30

## 2023-05-30 VITALS — BODY MASS INDEX: 29.06 KG/M2 | HEIGHT: 70 IN | RESPIRATION RATE: 14 BRPM | WEIGHT: 203 LBS

## 2023-05-30 DIAGNOSIS — N32.81 OAB (OVERACTIVE BLADDER): ICD-10-CM

## 2023-05-30 DIAGNOSIS — B37.9 CANDIDIASIS: Primary | ICD-10-CM

## 2023-05-30 DIAGNOSIS — R32 URINARY INCONTINENCE, UNSPECIFIED TYPE: ICD-10-CM

## 2023-05-30 LAB
BILIRUB BLD-MCNC: NEGATIVE MG/DL
CLARITY, POC: CLEAR
COLOR UR: YELLOW
EXPIRATION DATE: ABNORMAL
GLUCOSE UR STRIP-MCNC: ABNORMAL MG/DL
KETONES UR QL: NEGATIVE
LEUKOCYTE EST, POC: ABNORMAL
Lab: ABNORMAL
NITRITE UR-MCNC: NEGATIVE MG/ML
PH UR: 5 [PH] (ref 5–8)
PROT UR STRIP-MCNC: NEGATIVE MG/DL
RBC # UR STRIP: ABNORMAL /UL
SP GR UR: 1.01 (ref 1–1.03)
URINE VOLUME: 0
UROBILINOGEN UR QL: NORMAL

## 2023-05-30 PROCEDURE — 87086 URINE CULTURE/COLONY COUNT: CPT | Performed by: NURSE PRACTITIONER

## 2023-05-30 RX ORDER — FLUCONAZOLE 150 MG/1
150 TABLET ORAL DAILY
Qty: 3 TABLET | Refills: 0 | Status: SHIPPED | OUTPATIENT
Start: 2023-05-30 | End: 2023-06-05

## 2023-05-30 NOTE — PROGRESS NOTES
Chief Complaint: Follow-up (Pt here for follow up.)    Subjective         History of Present Illness  Vladimir Carter is a 89 y.o. male presents to [facility] to be seen for urinary leakage.    For the last several weeks patient has been having burning with urination and milky urine.    Urine culture was performed on 5/22/2023 and was negative for any bacterial growth.    Since that point in time patient has had increase in urinary leakage and now per his daughter is completely incontinent.    Patient is on tamsulosin 0.4 mg daily as well as Myrbetriq 25 mg daily.    Urine reviewed under the microscope today reveals white blood cells in clumps as well as budding yeast and ferning.    Objective     Past Medical History:   Diagnosis Date   • Abnormal bone density screening 09/29/2019    Spine-Osteopenia Hps-Osteoporosis   • Benign non-nodular prostatic hyperplasia with lower urinary tract symptoms 10/29/2018   • Bunion    • CAD (coronary artery disease)    • Callus    • Chronic pain syndrome    • CKD (chronic kidney disease) stage 3, GFR 30-59 ml/min    • GERD without esophagitis 09/16/2016   • H/O prostate cancer    • HTN (hypertension)    • Ingrown toenail    • Long term (current) use of anticoagulants    • Lupus anticoagulant disorder    • Moderate exercise     Active but no formal exercise   • BENJA (obstructive sleep apnea)    • Osteoarthritis of left knee 04/22/2016    end-stage   • Osteoporosis     Osteoporosis in the hips/femur no fracture   • Overweight (BMI 25.0-29.9)    • Pedal edema 7/12/2021   • Pulmonary HTN    • Severe tricuspid regurgitation 05/2018    ECHO (TTE) Diastolic dysfunction, Pulmonary HTN and Severe Tricuspid regurgitation   • Tinea unguium 01/24/2019       Past Surgical History:   Procedure Laterality Date   • CHOLECYSTECTOMY     • COLONOSCOPY     • CYSTOSCOPY     • HERNIA REPAIR     • OTHER SURGICAL HISTORY      Brachytherapy   • REPLACEMENT TOTAL KNEE Left  05/10/2017   • REPLACEMENT TOTAL KNEE Right    • WRIST SURGERY           Current Outpatient Medications:   •  ALBUTEROL IN, As Needed., Disp: , Rfl:   •  amLODIPine (NORVASC) 5 MG tablet, Take 1 tablet by mouth Daily., Disp: 90 tablet, Rfl: 3  •  CBD (cannabidiol) oral oil, Take  by mouth., Disp: , Rfl:   •  cetirizine (zyrTEC) 10 MG tablet, Take 1 tablet by mouth Daily. Indications: Hayfever, Disp: 90 tablet, Rfl: 3  •  Cholecalciferol 50 MCG (2000 UT) capsule, Vitamin D3 2,000 unit oral capsule take 1 capsule by oral route daily   Suspended, Disp: , Rfl:   •  DHA-EPA-FLAXSEED OIL-VITAMIN E PO, Take  by mouth., Disp: , Rfl:   •  docusate sodium (COLACE) 100 MG capsule, Take 1 capsule by mouth 2 (Two) Times a Day. Indications: Constipation, Disp: 180 capsule, Rfl: 3  •  Eliquis 2.5 MG tablet tablet, Take 1 tablet by mouth 2 (Two) Times a Day., Disp: 180 tablet, Rfl: 3  •  empagliflozin (Jardiance) 10 MG tablet tablet, Take 1 tablet by mouth Daily., Disp: 90 tablet, Rfl: 3  •  fluticasone (FLONASE) 50 MCG/ACT nasal spray, into the nostril(s) as directed by provider., Disp: , Rfl:   •  ipratropium (ATROVENT) 0.06 % nasal spray, 2 sprays into the nostril(s) as directed by provider 4 (Four) Times a Day., Disp: 15 mL, Rfl: 5  •  metoprolol succinate XL (TOPROL-XL) 50 MG 24 hr tablet, Take 1 tablet by mouth Daily., Disp: 90 tablet, Rfl: 3  •  Mirabegron ER (MYRBETRIQ) 50 MG tablet sustained-release 24 hour 24 hr tablet, Take 50 mg by mouth Daily., Disp: 90 tablet, Rfl: 3  •  multivitamin with minerals tablet tablet, multivitamin oral tablet take 1 tablet by oral route daily   Suspended, Disp: , Rfl:   •  pantoprazole (PROTONIX) 40 MG EC tablet, Take 1 tablet by mouth Daily., Disp: 90 tablet, Rfl: 1  •  Refresh Celluvisc 1 % gel eye gel, , Disp: , Rfl:   •  Restasis MultiDose 0.05 % ophthalmic emulsion, Daily., Disp: , Rfl:   •  spironolactone (Aldactone) 50 MG tablet, Take 1 tablet by mouth Daily. Take 1 tablet every day  "except on , Disp: 90 tablet, Rfl: 3  •  tamsulosin (FLOMAX) 0.4 MG capsule 24 hr capsule, Take 1 capsule by mouth Daily., Disp: 90 capsule, Rfl: 4  •  telmisartan (MICARDIS) 80 MG tablet, Take 1 tablet by mouth Daily., Disp: 90 tablet, Rfl: 3  •  torsemide (Demadex) 20 MG tablet, Take 1 tablet by mouth Daily., Disp: 90 tablet, Rfl: 3  •  fluconazole (DIFLUCAN) 150 MG tablet, Take 1 tablet by mouth Daily., Disp: 3 tablet, Rfl: 0    No Known Allergies     Family History   Problem Relation Age of Onset   • Heart disease Mother    • Hypertension Mother    • Breast cancer Daughter         Breast Neoplasm, Malignant   • Cancer Daughter         Unspecified   • Diabetes Daughter         Unspecified type       Social History     Socioeconomic History   • Marital status:    Tobacco Use   • Smoking status: Former     Packs/day: 2.00     Years: 15.00     Pack years: 30.00     Types: Cigarettes, Cigars     Start date: 1949     Quit date: 2009     Years since quittin.7   • Smokeless tobacco: Never   Vaping Use   • Vaping Use: Never used   Substance and Sexual Activity   • Alcohol use: Never   • Drug use: Never   • Sexual activity: Not Currently     Partners: Female     Birth control/protection: None       Vital Signs:   Resp 14   Ht 177.8 cm (70\")   Wt 92.1 kg (203 lb)   BMI 29.13 kg/m²      Physical Exam     Result Review :   The following data was reviewed by: FIORELLA Garcia on 2023:  Results for orders placed or performed in visit on 23   Bladder Scan   Result Value Ref Range    Urine Volume 0    POC Urinalysis Dipstick, Automated    Specimen: Urine   Result Value Ref Range    Color Yellow Yellow, Straw, Dark Yellow, Camilla    Clarity, UA Clear Clear    Specific Gravity  1.010 1.005 - 1.030    pH, Urine 5.0 5.0 - 8.0    Leukocytes Moderate (2+) (A) Negative    Nitrite, UA Negative Negative    Protein, POC Negative Negative mg/dL    Glucose,  mg/dL (A) Negative mg/dL    " Ketones, UA Negative Negative    Urobilinogen, UA Normal Normal, 0.2 E.U./dL    Bilirubin Negative Negative    Blood, UA Small (A) Negative    Lot Number [id number]     Expiration Date 2,024/7       PSA        2/24/2023    10:34   PSA   PSA <0.014           Procedures        Assessment and Plan    Diagnoses and all orders for this visit:    1. Candidiasis (Primary)  -     POC Urinalysis Dipstick, Automated  -     Bladder Scan  -     fluconazole (DIFLUCAN) 150 MG tablet; Take 1 tablet by mouth Daily.  Dispense: 3 tablet; Refill: 0    2. OAB (overactive bladder)  -     POC Urinalysis Dipstick, Automated  -     Bladder Scan  -     Mirabegron ER (MYRBETRIQ) 50 MG tablet sustained-release 24 hour 24 hr tablet; Take 50 mg by mouth Daily.  Dispense: 90 tablet; Refill: 3    3. Urinary incontinence, unspecified type  -     Urine Culture - Urine, Urine, Clean Catch; Future      We will increase his myrbetriq and treat for candidiasis.     Will send for cx again today and f/u in 3 weeks or sooner if needed       I spent 10 minutes caring for Vladimir on this date of service. This time includes time spent by me in the following activities:reviewing tests, obtaining and/or reviewing a separately obtained history, performing a medically appropriate examination and/or evaluation , counseling and educating the patient/family/caregiver, ordering medications, tests, or procedures, and documenting information in the medical record  Follow Up   Return in about 3 weeks (around 6/20/2023) for f./u bladder scan .  Patient was given instructions and counseling regarding his condition or for health maintenance advice. Please see specific information pulled into the AVS if appropriate.         This document has been electronically signed by FIORELLA Garcia  May 30, 2023 13:22 EDT

## 2023-05-31 LAB — BACTERIA SPEC AEROBE CULT: NO GROWTH

## 2023-06-02 ENCOUNTER — TELEPHONE (OUTPATIENT)
Dept: CARDIOLOGY | Facility: CLINIC | Age: 88
End: 2023-06-02

## 2023-06-05 ENCOUNTER — OFFICE VISIT (OUTPATIENT)
Dept: CARDIOLOGY | Facility: CLINIC | Age: 88
End: 2023-06-05
Payer: MEDICARE

## 2023-06-05 VITALS
BODY MASS INDEX: 27.13 KG/M2 | DIASTOLIC BLOOD PRESSURE: 62 MMHG | SYSTOLIC BLOOD PRESSURE: 98 MMHG | HEART RATE: 72 BPM | HEIGHT: 71 IN

## 2023-06-05 DIAGNOSIS — I50.32 CHF (CONGESTIVE HEART FAILURE), NYHA CLASS II, CHRONIC, DIASTOLIC: Primary | ICD-10-CM

## 2023-06-05 DIAGNOSIS — N18.32 STAGE 3B CHRONIC KIDNEY DISEASE: ICD-10-CM

## 2023-06-05 DIAGNOSIS — I10 ESSENTIAL HYPERTENSION: Chronic | ICD-10-CM

## 2023-06-05 DIAGNOSIS — E78.5 HYPERLIPIDEMIA LDL GOAL <100: Chronic | ICD-10-CM

## 2023-06-05 RX ORDER — AMLODIPINE BESYLATE 2.5 MG/1
2.5 TABLET ORAL DAILY
Qty: 90 TABLET | Refills: 3
Start: 2023-06-05

## 2023-06-05 NOTE — ASSESSMENT & PLAN NOTE
Mr. Carter has brought his heart rate and blood pressure log and his blood pressures are very well controlled.  We will have him continue his current regimen of low-dose amlodipine and full dose telmisartan along with the diuretics and Jardiance

## 2023-06-05 NOTE — ASSESSMENT & PLAN NOTE
Mr. Carter is doing very well from a CHF standpoint.  He has chronic diastolic CHF which is compensated now.  He is tolerating his medications.  His work-up for amyloidosis was negative.  He will continue his current medications and the current dosage and follow-up with Dr. Pena on a regular basis.  We will be happy to see as needed

## 2023-06-05 NOTE — PROGRESS NOTES
Office Visit    Chief Complaint  CHF, NYHA class II, chronic, diastolic    Subjective            Vladimir Carter presents to [facility] [place]  History of Present Illness  Mr. Carter is a 89 years old gentleman with chronic diastolic heart failure who is doing very well.  He denies any chest pain shortness of breath palpitations dizziness syncope.  He has had problems with bladder related issues and is undergoing evaluation and treatment.  His pedal edema is almost resolved.  He is tolerating the increased dose spironolactone.    Past Medical History:   Diagnosis Date    Abnormal bone density screening 09/29/2019    Spine-Osteopenia Hps-Osteoporosis    Benign non-nodular prostatic hyperplasia with lower urinary tract symptoms 10/29/2018    Bunion     CAD (coronary artery disease)     Callus     Chronic pain syndrome     CKD (chronic kidney disease) stage 3, GFR 30-59 ml/min     GERD without esophagitis 09/16/2016    H/O prostate cancer     HTN (hypertension)     Hyperlipidemia     Ingrown toenail     Long term (current) use of anticoagulants     Lupus anticoagulant disorder     Moderate exercise     Active but no formal exercise    BENJA (obstructive sleep apnea)     Osteoarthritis of left knee 04/22/2016    end-stage    Osteoporosis     Osteoporosis in the hips/femur no fracture    Overweight (BMI 25.0-29.9)     Pedal edema 07/12/2021    Pulmonary HTN     Severe tricuspid regurgitation 05/2018    ECHO (TTE) Diastolic dysfunction, Pulmonary HTN and Severe Tricuspid regurgitation    Tinea unguium 01/24/2019       No Known Allergies     Past Surgical History:   Procedure Laterality Date    CHOLECYSTECTOMY      COLONOSCOPY      CYSTOSCOPY      HERNIA REPAIR      OTHER SURGICAL HISTORY      Brachytherapy    REPLACEMENT TOTAL KNEE Left 05/10/2017    REPLACEMENT TOTAL KNEE Right     WRIST SURGERY          Social History     Tobacco Use    Smoking status: Former     Packs/day: 2.00      Years: 15.00     Pack years: 30.00     Types: Cigarettes, Cigars     Start date: 1949     Quit date: 2009     Years since quittin.7    Smokeless tobacco: Never   Vaping Use    Vaping Use: Never used   Substance Use Topics    Alcohol use: Never    Drug use: Never       Family History   Problem Relation Age of Onset    Heart disease Mother     Hypertension Mother     Breast cancer Daughter         Breast Neoplasm, Malignant    Cancer Daughter         Unspecified    Diabetes Daughter         Unspecified type        Prior to Admission medications    Medication Sig Start Date End Date Taking? Authorizing Provider   ALBUTEROL IN As Needed.    Marie Rankin MD   amLODIPine (NORVASC) 5 MG tablet Take 1 tablet by mouth Daily. 23   Anuel Pena MD   CBD (cannabidiol) oral oil Take  by mouth.    Marie Rankin MD   cetirizine (zyrTEC) 10 MG tablet Take 1 tablet by mouth Daily. Indications: Hayfever 23   Mango Kumar DO   Cholecalciferol 50 MCG (2000 UT) capsule Vitamin D3 2,000 unit oral capsule take 1 capsule by oral route daily   Suspended    Marie Rankin MD   DHA-EPA-FLAXSEED OIL-VITAMIN E PO Take  by mouth.    Marie Rankin MD   docusate sodium (COLACE) 100 MG capsule Take 1 capsule by mouth 2 (Two) Times a Day. Indications: Constipation 23   Mango Kumar DO   Eliquis 2.5 MG tablet tablet Take 1 tablet by mouth 2 (Two) Times a Day. 23   Anuel Pena MD   empagliflozin (Jardiance) 10 MG tablet tablet Take 1 tablet by mouth Daily. 23   Paxton Luis MD   fluconazole (DIFLUCAN) 150 MG tablet Take 1 tablet by mouth Daily. 23   Verna Bautista APRN   fluticasone (FLONASE) 50 MCG/ACT nasal spray into the nostril(s) as directed by provider.    Marie Rankin MD   ipratropium (ATROVENT) 0.06 % nasal spray 2 sprays into the nostril(s) as directed by provider 4 (Four) Times a Day. 22   Mango Kumar DO   metoprolol  "succinate XL (TOPROL-XL) 50 MG 24 hr tablet Take 1 tablet by mouth Daily. 5/23/23   Paxton Luis MD   Mirabegron ER (MYRBETRIQ) 50 MG tablet sustained-release 24 hour 24 hr tablet Take 50 mg by mouth Daily. 5/30/23   Verna Bautista APRN   Mirabegron ER (Myrbetriq) 50 MG tablet sustained-release 24 hour 24 hr tablet Take 50 mg by mouth Daily. 5/30/23   Verna Bautista APRN   multivitamin with minerals tablet tablet multivitamin oral tablet take 1 tablet by oral route daily   Suspended    Marie Rankin MD   pantoprazole (PROTONIX) 40 MG EC tablet Take 1 tablet by mouth Daily. 5/22/23   Mango Kumar DO   Refresh Celluvisc 1 % gel eye gel  10/14/22   Marie Rankin MD   Restasis MultiDose 0.05 % ophthalmic emulsion Daily. 8/6/21   Marie Rankin MD   spironolactone (Aldactone) 50 MG tablet Take 1 tablet by mouth Daily. Take 1 tablet every day except on Sunday 5/23/23   Paxton Luis MD   tamsulosin (FLOMAX) 0.4 MG capsule 24 hr capsule Take 1 capsule by mouth Daily. 4/14/23   Verna Bautista APRN   telmisartan (MICARDIS) 80 MG tablet Take 1 tablet by mouth Daily. 5/23/23   Paxton Luis MD   torsemide (Demadex) 20 MG tablet Take 1 tablet by mouth Daily. 5/23/23   Paxton Luis MD        Review of Systems   Constitutional:  Negative for fatigue.   Respiratory:  Negative for cough and shortness of breath.    Cardiovascular:  Positive for leg swelling. Negative for chest pain and palpitations.   Neurological:  Negative for dizziness.      Symptom Course: Been Unchanged    Weight Trend: Stable     Objective     BP 98/62   Pulse 72   Ht 180.3 cm (71\")   BMI 27.13 kg/m²       Physical Exam  Constitutional:       General: He is awake.      Appearance: Normal appearance.   Neck:      Thyroid: No thyromegaly.      Vascular: No carotid bruit or JVD.   Cardiovascular:      Rate and Rhythm: Normal rate and regular rhythm.      Chest Wall: PMI is not displaced.      Pulses: Normal " pulses.      Heart sounds: Normal heart sounds, S1 normal and S2 normal. No murmur heard.    No friction rub. No gallop. No S3 or S4 sounds.   Pulmonary:      Effort: Pulmonary effort is normal.      Breath sounds: Normal breath sounds and air entry. No wheezing, rhonchi or rales.   Abdominal:      General: Bowel sounds are normal.      Palpations: Abdomen is soft. There is no mass.      Tenderness: There is no abdominal tenderness.   Musculoskeletal:      Cervical back: Neck supple.      Right lower leg: No edema.      Left lower leg: No edema.   Neurological:      Mental Status: He is alert and oriented to person, place, and time.   Psychiatric:         Mood and Affect: Mood normal.         Behavior: Behavior is cooperative.         Result Review :                      Lab Results   Component Value Date    PROBNP 743.0 05/12/2023    PROBNP 745.0 04/28/2023    PROBNP 646.4 03/27/2023     CMP          4/17/2023    09:02 4/28/2023    13:42 5/12/2023    08:04   CMP   Glucose 98  96  110    BUN 21  31  25    Creatinine 1.41  2.02  1.71    EGFR 45.9  29.8  36.4    Sodium 144  140  139    Potassium 4.4  4.1  4.5    Chloride 106  100  104    Calcium 9.2  9.2  9.3    Total Protein 6.5  6.4  6.5    Albumin 3.7  3.9  3.5    Globulin 2.8  2.5  3.0    Total Bilirubin 0.3  0.4  0.5    Alkaline Phosphatase 67  69  72    AST (SGOT) 18  15  14    ALT (SGPT) 8  6  6    Albumin/Globulin Ratio 1.3  1.6  1.2    BUN/Creatinine Ratio 14.9  15.3  14.6    Anion Gap 9.4  12.3  10.4      CBC w/diff          2/24/2023    10:34 3/21/2023    09:18   CBC w/Diff   WBC 8.17  8.27    RBC 4.34  4.38    Hemoglobin 14.1  14.1    Hematocrit 41.5  44.1    MCV 95.6  100.7    MCH 32.5  32.2    MCHC 34.0  32.0    RDW 12.5  13.3    Platelets 205  212    Neutrophil Rel % 61.9  53.5    Immature Granulocyte Rel % 0.2  0.4    Lymphocyte Rel % 24.8  30.7    Monocyte Rel % 10.8  11.9    Eosinophil Rel % 1.7  2.9    Basophil Rel % 0.6  0.6       Lipid Panel           6/14/2022    06:16 2/24/2023    10:34   Lipid Panel   Total Cholesterol 131  138    Triglycerides 71  108    HDL Cholesterol 41  41    VLDL Cholesterol 14  20    LDL Cholesterol  76  77    LDL/HDL Ratio 1.85  1.84       Lab Results   Component Value Date    TSH 3.270 03/21/2023    TSH 3.850 02/24/2023    TSH 2.780 06/29/2021      Lab Results   Component Value Date    FREET4 1.29 03/21/2023    FREET4 1.31 02/24/2023    FREET4 1.16 06/29/2021      No results found for: DDIMERQUANT  Magnesium   Date Value Ref Range Status   05/12/2023 2.0 1.7 - 2.3 mg/dL Final      No results found for: DIGOXIN                 Assessment and Plan        Diagnoses and all orders for this visit:    1. CHF (congestive heart failure), NYHA class II, chronic, diastolic (Primary)  Assessment & Plan:  Mr. Carter is doing very well from a CHF standpoint.  He has chronic diastolic CHF which is compensated now.  He is tolerating his medications.  His work-up for amyloidosis was negative.  He will continue his current medications and the current dosage and follow-up with Dr. Pena on a regular basis.  We will be happy to see as needed    Orders:  -     CBC & Differential; Future  -     Comprehensive Metabolic Panel; Future  -     Magnesium; Future    2. Essential hypertension  Assessment & Plan:  Mr. Carter has brought his heart rate and blood pressure log and his blood pressures are very well controlled.  We will have him continue his current regimen of low-dose amlodipine and full dose telmisartan along with the diuretics and Jardiance    Orders:  -     CBC & Differential; Future  -     Comprehensive Metabolic Panel; Future  -     Magnesium; Future    3. Hyperlipidemia LDL goal <100    4. Stage 3b chronic kidney disease  -     CBC & Differential; Future  -     Comprehensive Metabolic Panel; Future  -     Magnesium; Future    Other orders  -     Cancel: CBC & Differential; Future  -     Cancel: Comprehensive Metabolic Panel; Future  -      Cancel: proBNP; Future  -     Cancel: Magnesium; Future  -     amLODIPine (NORVASC) 2.5 MG tablet; Take 1 tablet by mouth Daily.  Dispense: 90 tablet; Refill: 3            Follow Up     Return if symptoms worsen or fail to improve, for fu with dr reyes as scheduled in august.    Patient was given instructions and counseling regarding his condition or for health maintenance advice. Please see specific information pulled into the AVS if appropriate.     Paxton Luis MD  06/05/23 08:49 EDT

## 2023-06-05 NOTE — ASSESSMENT & PLAN NOTE
Mr. Carter has 3B chronic kidney disease which is currently stable with stable creatinine and normal potassium.  He will need close monitoring and Dr. Kumar has him on a regular monitoring schedule.  We will have him get blood work before he sees Dr. Pena

## 2023-07-09 PROBLEM — N39.41 URGE INCONTINENCE: Status: ACTIVE | Noted: 2023-07-09

## 2023-08-01 ENCOUNTER — LAB (OUTPATIENT)
Dept: LAB | Facility: HOSPITAL | Age: 88
End: 2023-08-01
Payer: MEDICARE

## 2023-08-01 DIAGNOSIS — I50.32 CHF (CONGESTIVE HEART FAILURE), NYHA CLASS II, CHRONIC, DIASTOLIC: ICD-10-CM

## 2023-08-01 DIAGNOSIS — I10 ESSENTIAL HYPERTENSION: ICD-10-CM

## 2023-08-01 DIAGNOSIS — R73.03 PREDIABETES: ICD-10-CM

## 2023-08-01 DIAGNOSIS — N18.32 STAGE 3B CHRONIC KIDNEY DISEASE: ICD-10-CM

## 2023-08-01 LAB
ALBUMIN SERPL-MCNC: 3.8 G/DL (ref 3.5–5.2)
ALBUMIN/GLOB SERPL: 1.4 G/DL
ALP SERPL-CCNC: 66 U/L (ref 39–117)
ALT SERPL W P-5'-P-CCNC: 7 U/L (ref 1–41)
ANION GAP SERPL CALCULATED.3IONS-SCNC: 12.3 MMOL/L (ref 5–15)
AST SERPL-CCNC: 18 U/L (ref 1–40)
BASOPHILS # BLD AUTO: 0.06 10*3/MM3 (ref 0–0.2)
BASOPHILS NFR BLD AUTO: 0.7 % (ref 0–1.5)
BILIRUB SERPL-MCNC: 0.3 MG/DL (ref 0–1.2)
BUN SERPL-MCNC: 34 MG/DL (ref 8–23)
BUN/CREAT SERPL: 21.5 (ref 7–25)
CALCIUM SPEC-SCNC: 9.2 MG/DL (ref 8.2–9.6)
CHLORIDE SERPL-SCNC: 104 MMOL/L (ref 98–107)
CHOLEST SERPL-MCNC: 130 MG/DL (ref 0–200)
CO2 SERPL-SCNC: 25.7 MMOL/L (ref 22–29)
CREAT SERPL-MCNC: 1.58 MG/DL (ref 0.76–1.27)
DEPRECATED RDW RBC AUTO: 52.6 FL (ref 37–54)
EGFRCR SERPLBLD CKD-EPI 2021: 40 ML/MIN/1.73
EOSINOPHIL # BLD AUTO: 0.15 10*3/MM3 (ref 0–0.4)
EOSINOPHIL NFR BLD AUTO: 1.7 % (ref 0.3–6.2)
ERYTHROCYTE [DISTWIDTH] IN BLOOD BY AUTOMATED COUNT: 14.5 % (ref 12.3–15.4)
GLOBULIN UR ELPH-MCNC: 2.7 GM/DL
GLUCOSE SERPL-MCNC: 106 MG/DL (ref 65–99)
HBA1C MFR BLD: 6.1 % (ref 4.8–5.6)
HCT VFR BLD AUTO: 45.3 % (ref 37.5–51)
HDLC SERPL-MCNC: 36 MG/DL (ref 40–60)
HGB BLD-MCNC: 14.7 G/DL (ref 13–17.7)
IMM GRANULOCYTES # BLD AUTO: 0.02 10*3/MM3 (ref 0–0.05)
IMM GRANULOCYTES NFR BLD AUTO: 0.2 % (ref 0–0.5)
LDLC SERPL CALC-MCNC: 81 MG/DL (ref 0–100)
LDLC/HDLC SERPL: 2.27 {RATIO}
LYMPHOCYTES # BLD AUTO: 3.19 10*3/MM3 (ref 0.7–3.1)
LYMPHOCYTES NFR BLD AUTO: 36.9 % (ref 19.6–45.3)
MAGNESIUM SERPL-MCNC: 2.3 MG/DL (ref 1.7–2.3)
MCH RBC QN AUTO: 32.2 PG (ref 26.6–33)
MCHC RBC AUTO-ENTMCNC: 32.5 G/DL (ref 31.5–35.7)
MCV RBC AUTO: 99.3 FL (ref 79–97)
MONOCYTES # BLD AUTO: 1.04 10*3/MM3 (ref 0.1–0.9)
MONOCYTES NFR BLD AUTO: 12 % (ref 5–12)
NEUTROPHILS NFR BLD AUTO: 4.18 10*3/MM3 (ref 1.7–7)
NEUTROPHILS NFR BLD AUTO: 48.5 % (ref 42.7–76)
NRBC BLD AUTO-RTO: 0 /100 WBC (ref 0–0.2)
PLATELET # BLD AUTO: 181 10*3/MM3 (ref 140–450)
PMV BLD AUTO: 10.6 FL (ref 6–12)
POTASSIUM SERPL-SCNC: 4.9 MMOL/L (ref 3.5–5.2)
PROT SERPL-MCNC: 6.5 G/DL (ref 6–8.5)
RBC # BLD AUTO: 4.56 10*6/MM3 (ref 4.14–5.8)
SODIUM SERPL-SCNC: 142 MMOL/L (ref 136–145)
T4 FREE SERPL-MCNC: 1.23 NG/DL (ref 0.93–1.7)
TRIGL SERPL-MCNC: 61 MG/DL (ref 0–150)
TSH SERPL DL<=0.05 MIU/L-ACNC: 2.94 UIU/ML (ref 0.27–4.2)
VLDLC SERPL-MCNC: 13 MG/DL (ref 5–40)
WBC NRBC COR # BLD: 8.64 10*3/MM3 (ref 3.4–10.8)

## 2023-08-01 PROCEDURE — 84439 ASSAY OF FREE THYROXINE: CPT

## 2023-08-01 PROCEDURE — 85025 COMPLETE CBC W/AUTO DIFF WBC: CPT

## 2023-08-01 PROCEDURE — 80061 LIPID PANEL: CPT

## 2023-08-01 PROCEDURE — 83735 ASSAY OF MAGNESIUM: CPT

## 2023-08-01 PROCEDURE — 36415 COLL VENOUS BLD VENIPUNCTURE: CPT

## 2023-08-01 PROCEDURE — 84443 ASSAY THYROID STIM HORMONE: CPT

## 2023-08-01 PROCEDURE — 80053 COMPREHEN METABOLIC PANEL: CPT

## 2023-08-01 PROCEDURE — 83036 HEMOGLOBIN GLYCOSYLATED A1C: CPT

## 2023-08-09 ENCOUNTER — OFFICE VISIT (OUTPATIENT)
Dept: PODIATRY | Facility: CLINIC | Age: 88
End: 2023-08-09
Payer: MEDICARE

## 2023-08-09 VITALS
WEIGHT: 194 LBS | SYSTOLIC BLOOD PRESSURE: 128 MMHG | BODY MASS INDEX: 27.06 KG/M2 | OXYGEN SATURATION: 91 % | HEART RATE: 56 BPM | DIASTOLIC BLOOD PRESSURE: 78 MMHG | TEMPERATURE: 98.4 F

## 2023-08-09 DIAGNOSIS — M79.671 FOOT PAIN, BILATERAL: Primary | ICD-10-CM

## 2023-08-09 DIAGNOSIS — R26.2 DIFFICULTY WALKING: ICD-10-CM

## 2023-08-09 DIAGNOSIS — B35.1 ONYCHOMYCOSIS: ICD-10-CM

## 2023-08-09 DIAGNOSIS — M79.672 FOOT PAIN, BILATERAL: Primary | ICD-10-CM

## 2023-08-09 DIAGNOSIS — L60.0 ONYCHOCRYPTOSIS: ICD-10-CM

## 2023-08-09 NOTE — PROGRESS NOTES
[facility] - PODIATRY    Today's Date: 08/09/23    Patient Name: Vladimir Carter  MRN: [id number]  CSN: [id number]  PCP: Mango Kumar DO, Last PCP Visit: 5/31/2023  Referring Provider: No ref. provider found    SUBJECTIVE     Chief Complaint   Patient presents with    Left Foot - Follow-up, Nail Problem    Right Foot - Follow-up, Nail Problem     HPI: Vladimir Carter, a 89 y.o.male, comes to clinic.    New, Established, New Problem:  established   Location:  Toenails  Duration:   Greater than five years  Onset:  Gradual  Nature:  sore with palpation.  Stable, worsening, improving:   Stable  Aggravating factors:  Pain with shoe gear and ambulation.  Previous Treatment:  debridement    Medical changes:  no changes.    Patient denies any fevers, chills, nausea, vomiting, shortness of breathe, nor any other constitutional signs nor symptoms.         Past Medical History:   Diagnosis Date    Abnormal bone density screening 09/29/2019    Spine-Osteopenia Hps-Osteoporosis    Benign non-nodular prostatic hyperplasia with lower urinary tract symptoms 10/29/2018    Bunion     CAD (coronary artery disease)     Callus     Chronic pain syndrome     CKD (chronic kidney disease) stage 3, GFR 30-59 ml/min     GERD without esophagitis 09/16/2016    H/O prostate cancer     HTN (hypertension)     Hyperlipidemia     Ingrown toenail     Long term (current) use of anticoagulants     Lupus anticoagulant disorder     Moderate exercise     Active but no formal exercise    BENJA (obstructive sleep apnea)     Osteoarthritis of left knee 04/22/2016    end-stage    Osteoporosis     Osteoporosis in the hips/femur no fracture    Overweight (BMI 25.0-29.9)     Pedal edema 07/12/2021    Pulmonary HTN     Severe tricuspid regurgitation 05/2018    ECHO (TTE) Diastolic dysfunction, Pulmonary HTN and Severe Tricuspid regurgitation    Tinea unguium 01/24/2019     Past Surgical History:   Procedure Laterality Date    CHOLECYSTECTOMY       COLONOSCOPY      CYSTOSCOPY      HERNIA REPAIR      OTHER SURGICAL HISTORY      Brachytherapy    REPLACEMENT TOTAL KNEE Left 05/10/2017    REPLACEMENT TOTAL KNEE Right     WRIST SURGERY       Family History   Problem Relation Age of Onset    Heart disease Mother     Hypertension Mother     Breast cancer Daughter         Breast Neoplasm, Malignant    Cancer Daughter         Unspecified    Diabetes Daughter         Unspecified type     Social History     Socioeconomic History    Marital status:    Tobacco Use    Smoking status: Former     Packs/day: 2.00     Years: 15.00     Pack years: 30.00     Types: Cigarettes, Cigars     Start date: 1949     Quit date: 2009     Years since quittin.9    Smokeless tobacco: Never   Vaping Use    Vaping Use: Never used   Substance and Sexual Activity    Alcohol use: Never    Drug use: Never    Sexual activity: Not Currently     Partners: Female     Birth control/protection: None     No Known Allergies  Current Outpatient Medications   Medication Sig Dispense Refill    ALBUTEROL IN As Needed.      alendronate (Fosamax) 70 MG tablet Take 1 tablet by mouth Every 7 (Seven) Days. 52 tablet 1    amLODIPine (NORVASC) 2.5 MG tablet Take 1 tablet by mouth Daily. 90 tablet 3    CBD (cannabidiol) oral oil Take  by mouth.      cetirizine (zyrTEC) 10 MG tablet Take 1 tablet by mouth Daily. Indications: Hayfever 90 tablet 3    Cholecalciferol 50 MCG (2000 UT) capsule Vitamin D3 2,000 unit oral capsule take 1 capsule by oral route daily   Suspended      DHA-EPA-FLAXSEED OIL-VITAMIN E PO Take  by mouth.      docusate sodium (COLACE) 100 MG capsule Take 1 capsule by mouth 2 (Two) Times a Day. Indications: Constipation 180 capsule 3    Eliquis 2.5 MG tablet tablet Take 1 tablet by mouth 2 (Two) Times a Day. 180 tablet 3    empagliflozin (Jardiance) 10 MG tablet tablet Take 1 tablet by mouth Daily. 90 tablet 3    fluticasone (FLONASE) 50 MCG/ACT nasal spray into the nostril(s)  as directed by provider.      ipratropium (ATROVENT) 0.06 % nasal spray 2 sprays into the nostril(s) as directed by provider 4 (Four) Times a Day. 15 mL 5    metoprolol succinate XL (TOPROL-XL) 50 MG 24 hr tablet Take 1 tablet by mouth Daily. 90 tablet 3    multivitamin with minerals tablet tablet multivitamin oral tablet take 1 tablet by oral route daily   Suspended      pantoprazole (PROTONIX) 40 MG EC tablet Take 1 tablet by mouth Daily. 90 tablet 1    Refresh Celluvisc 1 % gel eye gel       Restasis MultiDose 0.05 % ophthalmic emulsion Daily.      spironolactone (Aldactone) 50 MG tablet Take 1 tablet by mouth Daily. Take 1 tablet every day except on  90 tablet 3    tamsulosin (FLOMAX) 0.4 MG capsule 24 hr capsule Take 1 capsule by mouth Daily. 90 capsule 4    telmisartan (MICARDIS) 80 MG tablet Take 1 tablet by mouth Daily. 90 tablet 3    torsemide (Demadex) 20 MG tablet Take 1 tablet by mouth Daily. 90 tablet 3    Vibegron 75 MG tablet Take 1 tablet by mouth Daily for 60 days. 60 tablet 0     No current facility-administered medications for this visit.     Review of Systems   Constitutional: Negative.    Skin:         Painful toenails.   All other systems reviewed and are negative.    OBJECTIVE     Vitals:    23 1307   BP: 128/78   Pulse: 56   Temp: 98.4 øF (36.9 øC)   SpO2: 91%         Patient seen in no apparent distress.      PHYSICAL EXAM:     Foot/Ankle Exam    GENERAL  Appearance:  elderly  Orientation:  AAOx3  Affect:  appropriate  Gait:  antalgic  Assistance:  cane use  Right shoe gear: casual shoe  Left shoe gear: casual shoe    VASCULAR     Right Foot Vascularity   Dorsalis pedis:  1+  Posterior tibial:  1+  Skin temperature:  cool  Edema gradin+ and pitting  CFT:  < 3 seconds  Pedal hair growth:  Present  Varicosities:  mild varicosities     Left Foot Vascularity   Dorsalis pedis:  2+  Posterior tibial:  1+  Skin temperature:  cool  Edema gradin+ and pitting  CFT:  < 3  seconds  Pedal hair growth:  Present  Varicosities:  mild varicosities     NEUROLOGIC     Right Foot Neurologic   Normal sensation    Light touch sensation: normal  Vibratory sensation: normal  Hot/Cold sensation: normal  Protective Sensation using [place] Monofilament:   Sites intact: 10     Left Foot Neurologic   Normal sensation    Light touch sensation: normal  Vibratory sensation: normal  Hot/Cold sensation:  normal  Protective Sensation using [place] Monofilament:   Sites intact: 10  Sites tested: 10    MUSCLE STRENGTH     Right Foot Muscle Strength   Foot dorsiflexion:  4  Foot plantar flexion:  4  Foot inversion:  4  Foot eversion:  4     Left Foot Muscle Strength   Foot dorsiflexion:  4  Foot plantar flexion:  4  Foot inversion:  4  Foot eversion:  4    RANGE OF MOTION     Right Foot Range of Motion   Foot and ankle ROM within normal limits       Left Foot Range of Motion   Foot and ankle ROM within normal limits      DERMATOLOGIC      Right Foot Dermatologic   Skin  Right foot skin is intact.   Nails  1.  Positive for elongated, onychomycosis, abnormal thickness, subungual debris and ingrown toenail.  2.  Positive for elongated, onychomycosis, abnormal thickness, subungual debris and ingrown toenail.  3.  Positive for elongated, onychomycosis, abnormal thickness, subungual debris and ingrown toenail.  4.  Positive for elongated, onychomycosis, abnormal thickness, subungual debris and ingrown toenail.  5.  Positive for elongated, onychomycosis, abnormal thickness, subungual debris and ingrown toenail.  Nails comment:  Toenails 1, 2, 3, 4, and 5     Left Foot Dermatologic   Skin  Left foot skin is intact.   Nails comment:  Toenails 1, 2, 3, 4, and 5  Nails  1.  Positive for elongated, onychomycosis, abnormal thickness, subungual debris and ingrown toenail.  2.  Positive for elongated, onychomycosis, abnormal thickness, subungual debris and ingrown toenail.  3.  Positive for elongated,  onychomycosis, abnormal thickness, subungual debris and ingrown toenail.  4.  Positive for elongated, onychomycosis, abnormally thick, subungual debris and ingrown toenail.  5.  Positive for elongated, onychomycosis, abnormally thick, subungual debris and ingrown toenail.    ASSESSMENT/PLAN     Diagnoses and all orders for this visit:    1. Foot pain, bilateral (Primary)    2. Difficulty walking    3. Onychomycosis    4. Onychocryptosis        Comprehensive lower extremity examination and evaluation was performed.    Discussed findings and treatment plan including risks, benefits, and treatment options with patient in detail. Patient agreed with treatment plan.    Toenails 1 through 5 bilaterally were debrided in thickness and length and then smoothed with a Dremel Tool.  Tolerated the procedure well without complications.    An After Visit Summary was printed and given to the patient at discharge, including (if requested) any available informative/educational handouts regarding diagnosis, treatment, or medications. All questions were answered to patient/family satisfaction. Should symptoms fail to improve or worsen they agree to call or return to clinic or to go to the Emergency Department. Discussed the importance of following up with any needed screening tests/labs/specialist appointments and any requested follow-up recommended by me today. Importance of maintaining follow-up discussed and patient accepts that missed appointments can delay diagnosis and potentially lead to worsening of conditions.    Return in about 9 weeks (around 10/11/2023) for Toenail Care., or sooner if acute issues arise.    This document has been electronically signed by Enzo Ferrell DPM on August 9, 2023 13:40 EDT

## 2023-08-14 ENCOUNTER — OFFICE VISIT (OUTPATIENT)
Dept: CARDIOLOGY | Facility: CLINIC | Age: 88
End: 2023-08-14
Payer: MEDICARE

## 2023-08-14 VITALS
SYSTOLIC BLOOD PRESSURE: 144 MMHG | HEART RATE: 61 BPM | WEIGHT: 193.2 LBS | HEIGHT: 71 IN | DIASTOLIC BLOOD PRESSURE: 78 MMHG | BODY MASS INDEX: 27.05 KG/M2

## 2023-08-14 DIAGNOSIS — Z86.718 HISTORY OF DVT (DEEP VEIN THROMBOSIS): ICD-10-CM

## 2023-08-14 DIAGNOSIS — I50.32 CHF (CONGESTIVE HEART FAILURE), NYHA CLASS II, CHRONIC, DIASTOLIC: ICD-10-CM

## 2023-08-14 DIAGNOSIS — I10 ESSENTIAL HYPERTENSION: Primary | Chronic | ICD-10-CM

## 2023-08-14 PROCEDURE — 99213 OFFICE O/P EST LOW 20 MIN: CPT | Performed by: INTERNAL MEDICINE

## 2023-08-14 NOTE — PROGRESS NOTES
Chief Complaint  Congestive Heart Failure    Subjective        Vladimir Carter presents to [facility] CARDIOLOGY  History of present illness:    Patient states overall he is doing well.  He notes no chest pain or palpitations.  He notes some chronic edema right greater than left but he states is much better than it was.  He is watching his salt.  He notes no lightheadedness or bleeding problems.      Past Medical History:   Diagnosis Date    Abnormal bone density screening 09/29/2019    Spine-Osteopenia Hps-Osteoporosis    Benign non-nodular prostatic hyperplasia with lower urinary tract symptoms 10/29/2018    Bunion     CAD (coronary artery disease)     Callus     Chronic pain syndrome     CKD (chronic kidney disease) stage 3, GFR 30-59 ml/min     GERD without esophagitis 09/16/2016    H/O prostate cancer     HTN (hypertension)     Hyperlipidemia     Ingrown toenail     Long term (current) use of anticoagulants     Lupus anticoagulant disorder     Moderate exercise     Active but no formal exercise    BENJA (obstructive sleep apnea)     Osteoarthritis of left knee 04/22/2016    end-stage    Osteoporosis     Osteoporosis in the hips/femur no fracture    Overweight (BMI 25.0-29.9)     Pedal edema 07/12/2021    Pulmonary HTN     Severe tricuspid regurgitation 05/2018    ECHO (TTE) Diastolic dysfunction, Pulmonary HTN and Severe Tricuspid regurgitation    Tinea unguium 01/24/2019         Past Surgical History:   Procedure Laterality Date    CHOLECYSTECTOMY      COLONOSCOPY      CYSTOSCOPY      HERNIA REPAIR      OTHER SURGICAL HISTORY      Brachytherapy    REPLACEMENT TOTAL KNEE Left 05/10/2017    REPLACEMENT TOTAL KNEE Right     WRIST SURGERY            Social History     Socioeconomic History    Marital status:    Tobacco Use    Smoking status: Former     Packs/day: 2.00     Years: 15.00     Pack years: 30.00     Types: Cigarettes, Cigars     Start date: [birth year]28/1949     Quit date: 8/28/2009      Years since quittin.9    Smokeless tobacco: Never   Vaping Use    Vaping Use: Never used   Substance and Sexual Activity    Alcohol use: Never    Drug use: Never    Sexual activity: Not Currently     Partners: Female     Birth control/protection: None         Family History   Problem Relation Age of Onset    Heart disease Mother     Hypertension Mother     Breast cancer Daughter         Breast Neoplasm, Malignant    Cancer Daughter         Unspecified    Diabetes Daughter         Unspecified type          No Known Allergies         Current Outpatient Medications:     ALBUTEROL IN, As Needed., Disp: , Rfl:     alendronate (Fosamax) 70 MG tablet, Take 1 tablet by mouth Every 7 (Seven) Days., Disp: 52 tablet, Rfl: 1    amLODIPine (NORVASC) 2.5 MG tablet, Take 1 tablet by mouth Daily., Disp: 90 tablet, Rfl: 3    CBD (cannabidiol) oral oil, Take  by mouth., Disp: , Rfl:     cetirizine (zyrTEC) 10 MG tablet, Take 1 tablet by mouth Daily. Indications: Hayfever, Disp: 90 tablet, Rfl: 3    Cholecalciferol 50 MCG (2000 UT) capsule, Vitamin D3 2,000 unit oral capsule take 1 capsule by oral route daily   Suspended, Disp: , Rfl:     DHA-EPA-FLAXSEED OIL-VITAMIN E PO, Take  by mouth., Disp: , Rfl:     docusate sodium (COLACE) 100 MG capsule, Take 1 capsule by mouth 2 (Two) Times a Day. Indications: Constipation, Disp: 180 capsule, Rfl: 3    Eliquis 2.5 MG tablet tablet, Take 1 tablet by mouth 2 (Two) Times a Day., Disp: 180 tablet, Rfl: 3    empagliflozin (Jardiance) 10 MG tablet tablet, Take 1 tablet by mouth Daily., Disp: 90 tablet, Rfl: 3    fluticasone (FLONASE) 50 MCG/ACT nasal spray, into the nostril(s) as directed by provider., Disp: , Rfl:     ipratropium (ATROVENT) 0.06 % nasal spray, 2 sprays into the nostril(s) as directed by provider 4 (Four) Times a Day., Disp: 15 mL, Rfl: 5    metoprolol succinate XL (TOPROL-XL) 50 MG 24 hr tablet, Take 1 tablet by mouth Daily., Disp: 90 tablet, Rfl: 3    multivitamin with  "minerals tablet tablet, multivitamin oral tablet take 1 tablet by oral route daily   Suspended, Disp: , Rfl:     pantoprazole (PROTONIX) 40 MG EC tablet, Take 1 tablet by mouth Daily., Disp: 90 tablet, Rfl: 1    Refresh Celluvisc 1 % gel eye gel, , Disp: , Rfl:     Restasis MultiDose 0.05 % ophthalmic emulsion, Daily., Disp: , Rfl:     spironolactone (Aldactone) 50 MG tablet, Take 1 tablet by mouth Daily. Take 1 tablet every day except on Sunday, Disp: 90 tablet, Rfl: 3    tamsulosin (FLOMAX) 0.4 MG capsule 24 hr capsule, Take 1 capsule by mouth Daily., Disp: 90 capsule, Rfl: 4    telmisartan (MICARDIS) 80 MG tablet, Take 1 tablet by mouth Daily., Disp: 90 tablet, Rfl: 3    torsemide (Demadex) 20 MG tablet, Take 1 tablet by mouth Daily., Disp: 90 tablet, Rfl: 3    Vibegron 75 MG tablet, Take 1 tablet by mouth Daily for 60 days., Disp: 60 tablet, Rfl: 0      ROS:  Cardiac review of systems negative.    Objective     /78   Pulse 61   Ht 180.3 cm (71\")   Wt 87.6 kg (193 lb 3.2 oz)   BMI 26.95 kg/mý       General Appearance:   well developed  well nourished  HENT:   oropharynx moist  lips not cyanotic  Respiratory:  no respiratory distress  normal breath sounds  no rales  Cardiovascular:  no jugular venous distention  regular rhythm  S1 normal, S2 normal  no S3, no S4   no murmur  no rub, no thrill  No carotid bruit  pedal pulses normal  lower extremity edema: none    Musculoskeletal:  no clubbing of fingers.   normocephalic, head atraumatic  Skin:   warm, dry  Psychiatric:  judgement and insight appropriate  normal mood and affect    ECHO:  Results for orders placed in visit on 08/03/22    Adult Transthoracic Echo Complete W/ Cont if Necessary Per Protocol    Interpretation Summary  ú Estimated left ventricular EF was in agreement with the calculated left ventricular EF. Left ventricular ejection fraction appears to be 61 - 65%. Left ventricular systolic function is normal.  ú Left ventricular diastolic " function is consistent with (grade I) impaired relaxation.  ú There is calcification of the aortic valve. There is mild aortic valve regurgitation noted but no significant stenosis  ú Mild dilation of the aortic root is present.  ú Estimated right ventricular systolic pressure from tricuspid regurgitation is mildly elevated (35-45 mmHg).  ú Mild pulmonary hypertension is present.    STRESS:  Results for orders placed during the hospital encounter of 03/31/23    Cardiac Amyloid Pyrophosphate (PYP) Scan    Narrative  PYP Imaging Procedure  Procedure description: The patient was injected with 23.6 mCi of Technetium Pyrophosphate intravenously. SPECT imaging of the chest was performed approximately 3 hours later.    Imaging Interpretation  Image Quality:  [x]  Excellent  []  Good  []  Fair  []  Poor    Visual Grading of Cardiac PYP Uptake Compared to Bone:  [x]  Grade 0 - No cardiac uptake and normal rib uptake  []  Grade 1 - Cardiac uptake less than rib uptake  []  Grade 2 - Cardiac uptake equal to rib uptake  []  Grade 3 - Cardiac uptake greater than rib uptake with mild/absent rib uptake    Quantitation of Cardiac PYP Uptake using Heart-to-Contralateral Lung (H/CL) Ratio:  Calculated H/CL Ratio: 0.87    Conclusion  The findings in this study are:    [x]  Not suggestive of ATTR amyloidosis  (visual score of 0 or H/CL ratio <1)    []  Strongly suggestive of ATTR amyloidosis  (visual score of 2 or 3 or H/CL ratio > 1.3)    []  Equivocal  (visual score of 1 or H/CL ratio 1-1.3)  IMPORTANT:  Equivocal results could represent AL amyloid or early ATTR cardiac amyloid.  The appropriate laboratory testing to rule out AL amyloidosis must be performed for the accurate interpretation of PYP imaging.    Additional Comments    CATH:  No results found for this or any previous visit.    BMP:     Glucose   Date Value Ref Range Status   08/01/2023 106 (H) 65 - 99 mg/dL Final     BUN   Date Value Ref Range Status   08/01/2023 34 (H) 8  - 23 mg/dL Final     Creatinine   Date Value Ref Range Status   08/01/2023 1.58 (H) 0.76 - 1.27 mg/dL Final     Sodium   Date Value Ref Range Status   08/01/2023 142 136 - 145 mmol/L Final     Potassium   Date Value Ref Range Status   08/01/2023 4.9 3.5 - 5.2 mmol/L Final     Chloride   Date Value Ref Range Status   08/01/2023 104 98 - 107 mmol/L Final     CO2   Date Value Ref Range Status   08/01/2023 25.7 22.0 - 29.0 mmol/L Final     Calcium   Date Value Ref Range Status   08/01/2023 9.2 8.2 - 9.6 mg/dL Final     BUN/Creatinine Ratio   Date Value Ref Range Status   08/01/2023 21.5 7.0 - 25.0 Final     Anion Gap   Date Value Ref Range Status   08/01/2023 12.3 5.0 - 15.0 mmol/L Final     eGFR   Date Value Ref Range Status   08/01/2023 40.0 (L) >60.0 mL/min/1.73 Final     LIPIDS:  Total Cholesterol   Date Value Ref Range Status   08/01/2023 130 0 - 200 mg/dL Final     Triglycerides   Date Value Ref Range Status   08/01/2023 61 0 - 150 mg/dL Final     HDL Cholesterol   Date Value Ref Range Status   08/01/2023 36 (L) 40 - 60 mg/dL Final     LDL Cholesterol    Date Value Ref Range Status   08/01/2023 81 0 - 100 mg/dL Final     VLDL Cholesterol   Date Value Ref Range Status   08/01/2023 13 5 - 40 mg/dL Final     LDL/HDL Ratio   Date Value Ref Range Status   08/01/2023 2.27  Final         Procedures             ASSESSMENT:  Diagnoses and all orders for this visit:    1. Essential hypertension (Primary)    2. History of DVT (deep vein thrombosis)    3. CHF (congestive heart failure), NYHA class II, chronic, diastolic         PLAN:    1.  Patient is on the Eliquis due to history of DVT/pulmonary embolism back in 2017.  He notes no bleeding problems.  2.  Blood pressures under good control.  3.  Patient's edema is overall doing well.  He is watching his salt and we talked about copper fit stockings.  If it gets worse he will let us know.  4.  Patient had echocardiogram done 8/3/2022 with ejection fraction 61 to 65% with mild  aortic insufficiency.  5.  Patient had cholesterol checked 8/1/2023 with LDL 81, HDL 36, triglycerides 61.  These are under great control.  6.  Patient has chronic kidney disease but his serum creatinine has been very stable.      Return in about 6 months (around 2/14/2024).     Patient was given instructions and counseling regarding his condition or for health maintenance advice. Please see specific information pulled into the AVS if appropriate.         Anuel Pena MD   8/14/2023  14:37 EDT

## 2023-09-05 DIAGNOSIS — N39.41 URGE INCONTINENCE: ICD-10-CM

## 2023-09-05 DIAGNOSIS — Z85.46 HISTORY OF PROSTATE CANCER: ICD-10-CM

## 2023-09-05 DIAGNOSIS — M81.0 AGE-RELATED OSTEOPOROSIS WITHOUT CURRENT PATHOLOGICAL FRACTURE: ICD-10-CM

## 2023-09-05 DIAGNOSIS — N40.1 BENIGN PROSTATIC HYPERPLASIA WITH LOWER URINARY TRACT SYMPTOMS, SYMPTOM DETAILS UNSPECIFIED: ICD-10-CM

## 2023-09-05 DIAGNOSIS — N32.81 OAB (OVERACTIVE BLADDER): ICD-10-CM

## 2023-09-05 RX ORDER — ALENDRONATE SODIUM 70 MG/1
70 TABLET ORAL
Qty: 52 TABLET | Refills: 1 | Status: SHIPPED | OUTPATIENT
Start: 2023-09-05

## 2023-11-14 RX ORDER — PANTOPRAZOLE SODIUM 40 MG/1
40 TABLET, DELAYED RELEASE ORAL DAILY
Qty: 90 TABLET | Refills: 1 | Status: SHIPPED | OUTPATIENT
Start: 2023-11-14

## 2023-11-14 RX ORDER — TORSEMIDE 20 MG/1
20 TABLET ORAL DAILY
Qty: 90 TABLET | Refills: 3 | Status: CANCELLED | OUTPATIENT
Start: 2023-11-14

## 2023-11-14 NOTE — TELEPHONE ENCOUNTER
Med refill, patient last seen 5/31/23 for AWV.  Last labs 8/1/23.  Call pt when refill has been sent to [facility].

## 2023-11-14 NOTE — TELEPHONE ENCOUNTER
DONNIE VALLE.  ADVISED HER TORSEMIDE PRESCRIPTION WAS SENT TO [facility] 05/23/23 WITH A ONE YEAR SUPPLY.   TORI IS GOING TO CHECK WITH PHARMACY AGAIN.  ADVISED HER IF [facility] CANNOT LOCATE IT TO LET ME KNOW AND I WILL RESEND IT TO THEM AGAIN.

## 2024-01-24 ENCOUNTER — TELEPHONE (OUTPATIENT)
Dept: FAMILY MEDICINE CLINIC | Facility: CLINIC | Age: 89
End: 2024-01-24

## 2024-01-24 RX ORDER — AMLODIPINE BESYLATE 2.5 MG/1
2.5 TABLET ORAL DAILY
Start: 2024-01-24 | End: 2024-01-25 | Stop reason: SDUPTHER

## 2024-01-24 NOTE — TELEPHONE ENCOUNTER
Caller: TORI OLIVER    Relationship: Emergency Contact    Best call back number: [phone]     Requested Prescriptions:   Requested Prescriptions     Pending Prescriptions Disp Refills    amLODIPine (NORVASC) 2.5 MG tablet       Sig: Take 1 tablet by mouth Daily.    empagliflozin (Jardiance) 10 MG tablet tablet 90 tablet 3     Sig: Take 1 tablet by mouth Daily.        Pharmacy where request should be sent: [address] [phone] [place] [phone]      Last office visit with prescribing clinician: 6/5/2023   Last telemedicine visit with prescribing clinician: Visit date not found   Next office visit with prescribing clinician: Visit date not found     Additional details provided by patient: 2 WEEKS    Does the patient have less than a 3 day supply:  [] Yes  [x] No    Would you like a call back once the refill request has been completed: [] Yes [] No    If the office needs to give you a call back, can they leave a voicemail: [] Yes [] No    Ric Haywood Rep   01/24/24 11:19 EST

## 2024-01-24 NOTE — TELEPHONE ENCOUNTER
Caller: TORI OLIVER    Relationship: Emergency Contact    Best call back number: [phone]    What is the best time to reach you: ANYTIME     Who are you requesting to speak with (clinical staff, provider,  specific staff member): CLINICAL    Do you know the name of the person who called:      What was the call regarding:        THE PATIENT'S DAUGHTER IS WANTING A CALL TO ADVISED WHEN  THE PATIENT  FOR HAD HIS LAST ANNUAL MEDICARE WELLNESS VISIT. SHE SAID IT HAS BEEN A LONG TIME SINCE HE HAS LABS AND IS WANTING TO KNOW IF PCP  ISAK IS NEEDING TO SEE HIM. SHE SAID THAT THE PATIENT IS NOW FORGETTING THINGS . SHE SAID IF AN APPOINTMENT IS NEEDED SHE WOULD APPRECIATE AN EARLY APPOINTMENT.    SHE SAID IT IS OK TO RESPOND THROUGH [employer]

## 2024-01-24 NOTE — TELEPHONE ENCOUNTER
Caller: TORI OLIVER    Relationship: Emergency Contact    Best call back number: [phone]     Requested Prescriptions:   Requested Prescriptions     Pending Prescriptions Disp Refills    Eliquis 2.5 MG tablet tablet 180 tablet 3     Sig: Take 1 tablet by mouth 2 (Two) Times a Day.        Pharmacy where request should be sent: [place] [phone] [facility] [phone]      Last office visit with prescribing clinician: 8/14/2023   Last telemedicine visit with prescribing clinician: Visit date not found   Next office visit with prescribing clinician: 2/26/2024     Additional details provided by patient: 2 WEEKS LEFT    Does the patient have less than a 3 day supply:  [] Yes  [x] No    Would you like a call back once the refill request has been completed: [] Yes [] No    If the office needs to give you a call back, can they leave a voicemail: [] Yes [] No    Ric Haywood Rep   01/24/24 11:21 EST

## 2024-01-25 RX ORDER — APIXABAN 2.5 MG/1
2.5 TABLET, FILM COATED ORAL 2 TIMES DAILY
Qty: 180 TABLET | Refills: 1 | Status: SHIPPED | OUTPATIENT
Start: 2024-01-25

## 2024-01-25 RX ORDER — AMLODIPINE BESYLATE 2.5 MG/1
2.5 TABLET ORAL DAILY
Qty: 90 TABLET | Refills: 1 | Status: SHIPPED | OUTPATIENT
Start: 2024-01-25

## 2024-01-25 NOTE — TELEPHONE ENCOUNTER
AMLODIPINE SCRIPT SENT YESTERDAY DID NOT GO TO THE PHARMACY DUE TO BEING MARKED NO PRINT.  SENT TO PHARMACY.

## 2024-02-01 ENCOUNTER — LAB (OUTPATIENT)
Dept: LAB | Facility: HOSPITAL | Age: 89
End: 2024-02-01
Payer: MEDICARE

## 2024-02-01 DIAGNOSIS — Z12.5 PROSTATE CANCER SCREENING: ICD-10-CM

## 2024-02-01 LAB — PSA SERPL-MCNC: <0.014 NG/ML (ref 0–4)

## 2024-02-01 PROCEDURE — 36415 COLL VENOUS BLD VENIPUNCTURE: CPT

## 2024-02-01 PROCEDURE — G0103 PSA SCREENING: HCPCS

## 2024-02-13 ENCOUNTER — OFFICE VISIT (OUTPATIENT)
Dept: UROLOGY | Facility: CLINIC | Age: 89
End: 2024-02-13
Payer: MEDICARE

## 2024-02-13 VITALS
SYSTOLIC BLOOD PRESSURE: 141 MMHG | WEIGHT: 193 LBS | RESPIRATION RATE: 12 BRPM | DIASTOLIC BLOOD PRESSURE: 68 MMHG | HEART RATE: 70 BPM | HEIGHT: 71 IN | BODY MASS INDEX: 27.02 KG/M2

## 2024-02-13 DIAGNOSIS — N40.1 BENIGN PROSTATIC HYPERPLASIA WITH URINARY FREQUENCY: ICD-10-CM

## 2024-02-13 DIAGNOSIS — Z85.46 HISTORY OF PROSTATE CANCER: Primary | ICD-10-CM

## 2024-02-13 DIAGNOSIS — R30.0 DYSURIA: ICD-10-CM

## 2024-02-13 DIAGNOSIS — Z12.5 PROSTATE CANCER SCREENING: ICD-10-CM

## 2024-02-13 DIAGNOSIS — R35.0 BENIGN PROSTATIC HYPERPLASIA WITH URINARY FREQUENCY: ICD-10-CM

## 2024-02-13 PROCEDURE — 1159F MED LIST DOCD IN RCRD: CPT | Performed by: NURSE PRACTITIONER

## 2024-02-13 PROCEDURE — 99213 OFFICE O/P EST LOW 20 MIN: CPT | Performed by: NURSE PRACTITIONER

## 2024-02-13 PROCEDURE — 1160F RVW MEDS BY RX/DR IN RCRD: CPT | Performed by: NURSE PRACTITIONER

## 2024-02-26 ENCOUNTER — OFFICE VISIT (OUTPATIENT)
Dept: CARDIOLOGY | Facility: CLINIC | Age: 89
End: 2024-02-26
Payer: MEDICARE

## 2024-02-26 VITALS
SYSTOLIC BLOOD PRESSURE: 127 MMHG | HEIGHT: 71 IN | WEIGHT: 194 LBS | BODY MASS INDEX: 27.16 KG/M2 | HEART RATE: 68 BPM | DIASTOLIC BLOOD PRESSURE: 65 MMHG

## 2024-02-26 DIAGNOSIS — I50.32 CHF (CONGESTIVE HEART FAILURE), NYHA CLASS II, CHRONIC, DIASTOLIC: ICD-10-CM

## 2024-02-26 DIAGNOSIS — Z86.718 HISTORY OF DVT (DEEP VEIN THROMBOSIS): ICD-10-CM

## 2024-02-26 DIAGNOSIS — I10 ESSENTIAL HYPERTENSION: Primary | Chronic | ICD-10-CM

## 2024-02-26 DIAGNOSIS — R60.0 PEDAL EDEMA: ICD-10-CM

## 2024-02-26 PROCEDURE — 99214 OFFICE O/P EST MOD 30 MIN: CPT | Performed by: INTERNAL MEDICINE

## 2024-02-26 NOTE — PROGRESS NOTES
Chief Complaint  Congestive Heart Failure and Hypertension    Subjective        Vladimir Carter presents to [facility] CARDIOLOGY  History of present illness:    Patient states overall he is doing well.  He notes no bleeding problems, chest pain, or palpitations.  He does note some right lower extremity edema that is not bothering him.  He is watching his salt.      Past Medical History:   Diagnosis Date    Abnormal bone density screening 09/29/2019    Spine-Osteopenia Hps-Osteoporosis    Benign non-nodular prostatic hyperplasia with lower urinary tract symptoms 10/29/2018    Bunion     CAD (coronary artery disease)     Callus     Chronic pain syndrome     CKD (chronic kidney disease) stage 3, GFR 30-59 ml/min     GERD without esophagitis 09/16/2016    H/O prostate cancer     HTN (hypertension)     Hyperlipidemia     Ingrown toenail     Long term (current) use of anticoagulants     Lupus anticoagulant disorder     Moderate exercise     Active but no formal exercise    BENJA (obstructive sleep apnea)     Osteoarthritis of left knee 04/22/2016    end-stage    Osteoporosis     Osteoporosis in the hips/femur no fracture    Overweight (BMI 25.0-29.9)     Pedal edema 07/12/2021    Pulmonary HTN     Severe tricuspid regurgitation 05/2018    ECHO (TTE) Diastolic dysfunction, Pulmonary HTN and Severe Tricuspid regurgitation    Tinea unguium 01/24/2019         Past Surgical History:   Procedure Laterality Date    CHOLECYSTECTOMY      COLONOSCOPY      CYSTOSCOPY      HERNIA REPAIR      OTHER SURGICAL HISTORY      Brachytherapy    REPLACEMENT TOTAL KNEE Left 05/10/2017    REPLACEMENT TOTAL KNEE Right     WRIST SURGERY            Social History     Socioeconomic History    Marital status:    Tobacco Use    Smoking status: Former     Packs/day: 2.00     Years: 15.00     Additional pack years: 0.00     Total pack years: 30.00     Types: Cigarettes, Cigars     Start date: 8/28/1949     Quit date: 8/28/2009      Years since quittin.5     Passive exposure: Never    Smokeless tobacco: Never   Vaping Use    Vaping Use: Never used   Substance and Sexual Activity    Alcohol use: Never    Drug use: Never    Sexual activity: Not Currently     Partners: Female     Birth control/protection: None         Family History   Problem Relation Age of Onset    Heart disease Mother     Hypertension Mother     Breast cancer Daughter         Breast Neoplasm, Malignant    Cancer Daughter         Unspecified    Diabetes Daughter         Unspecified type          No Known Allergies         Current Outpatient Medications:     ALBUTEROL IN, As Needed., Disp: , Rfl:     alendronate (Fosamax) 70 MG tablet, Take 1 tablet by mouth Every 7 (Seven) Days., Disp: 52 tablet, Rfl: 1    amLODIPine (NORVASC) 2.5 MG tablet, Take 1 tablet by mouth Daily., Disp: 90 tablet, Rfl: 1    CBD (cannabidiol) oral oil, Take  by mouth., Disp: , Rfl:     cetirizine (zyrTEC) 10 MG tablet, Take 1 tablet by mouth Daily. Indications: Hayfever, Disp: 90 tablet, Rfl: 3    Cholecalciferol 50 MCG (2000 UT) capsule, Vitamin D3 2,000 unit oral capsule take 1 capsule by oral route daily   Suspended, Disp: , Rfl:     DHA-EPA-FLAXSEED OIL-VITAMIN E PO, Take  by mouth., Disp: , Rfl:     docusate sodium (COLACE) 100 MG capsule, Take 1 capsule by mouth 2 (Two) Times a Day. Indications: Constipation, Disp: 180 capsule, Rfl: 3    Eliquis 2.5 MG tablet tablet, Take 1 tablet by mouth 2 (Two) Times a Day., Disp: 180 tablet, Rfl: 1    empagliflozin (Jardiance) 10 MG tablet tablet, Take 1 tablet by mouth Daily., Disp: 90 tablet, Rfl: 3    fluticasone (FLONASE) 50 MCG/ACT nasal spray, into the nostril(s) as directed by provider., Disp: , Rfl:     ipratropium (ATROVENT) 0.06 % nasal spray, 2 sprays into the nostril(s) as directed by provider 4 (Four) Times a Day., Disp: 15 mL, Rfl: 5    metoprolol succinate XL (TOPROL-XL) 50 MG 24 hr tablet, Take 1 tablet by mouth Daily., Disp: 90 tablet,  "Rfl: 3    multivitamin with minerals tablet tablet, multivitamin oral tablet take 1 tablet by oral route daily   Suspended, Disp: , Rfl:     pantoprazole (PROTONIX) 40 MG EC tablet, Take 1 tablet by mouth Daily., Disp: 90 tablet, Rfl: 1    Refresh Celluvisc 1 % gel eye gel, , Disp: , Rfl:     Restasis MultiDose 0.05 % ophthalmic emulsion, Daily., Disp: , Rfl:     spironolactone (Aldactone) 50 MG tablet, Take 1 tablet by mouth Daily. Take 1 tablet every day except on Sunday, Disp: 90 tablet, Rfl: 3    tamsulosin (FLOMAX) 0.4 MG capsule 24 hr capsule, Take 1 capsule by mouth Daily., Disp: 90 capsule, Rfl: 4    telmisartan (MICARDIS) 80 MG tablet, Take 1 tablet by mouth Daily., Disp: 90 tablet, Rfl: 3    torsemide (Demadex) 20 MG tablet, Take 1 tablet by mouth Daily., Disp: 90 tablet, Rfl: 3    Vibegron 75 MG tablet, Take 1 tablet by mouth Daily for 300 days., Disp: 60 tablet, Rfl: 4      ROS:  Cardiac review of systems negative.    Objective     /65   Pulse 68   Ht 180.3 cm (71\")   Wt 88 kg (194 lb)   BMI 27.06 kg/m²       General Appearance:   well developed  well nourished  HENT:   oropharynx moist  lips not cyanotic  Respiratory:  no respiratory distress  normal breath sounds  no rales  Cardiovascular:  no jugular venous distention  regular rhythm  S1 normal, S2 normal  no S3, no S4   no murmur  no rub, no thrill  No carotid bruit  pedal pulses normal  lower extremity edema: none    Musculoskeletal:  no clubbing of fingers.   normocephalic, head atraumatic  Skin:   warm, dry  Psychiatric:  judgement and insight appropriate  normal mood and affect    ECHO:  Results for orders placed in visit on 08/03/22    Adult Transthoracic Echo Complete W/ Cont if Necessary Per Protocol    Interpretation Summary  · Estimated left ventricular EF was in agreement with the calculated left ventricular EF. Left ventricular ejection fraction appears to be 61 - 65%. Left ventricular systolic function is normal.  · Left " ventricular diastolic function is consistent with (grade I) impaired relaxation.  · There is calcification of the aortic valve. There is mild aortic valve regurgitation noted but no significant stenosis  · Mild dilation of the aortic root is present.  · Estimated right ventricular systolic pressure from tricuspid regurgitation is mildly elevated (35-45 mmHg).  · Mild pulmonary hypertension is present.    STRESS:  Results for orders placed during the hospital encounter of 03/31/23    Cardiac Amyloid Pyrophosphate (PYP) Scan    Narrative  PYP Imaging Procedure  Procedure description: The patient was injected with 23.6 mCi of Technetium Pyrophosphate intravenously. SPECT imaging of the chest was performed approximately 3 hours later.    Imaging Interpretation  Image Quality:  [x]  Excellent  []  Good  []  Fair  []  Poor    Visual Grading of Cardiac PYP Uptake Compared to Bone:  [x]  Grade 0 - No cardiac uptake and normal rib uptake  []  Grade 1 - Cardiac uptake less than rib uptake  []  Grade 2 - Cardiac uptake equal to rib uptake  []  Grade 3 - Cardiac uptake greater than rib uptake with mild/absent rib uptake    Quantitation of Cardiac PYP Uptake using Heart-to-Contralateral Lung (H/CL) Ratio:  Calculated H/CL Ratio: 0.87    Conclusion  The findings in this study are:    [x]  Not suggestive of ATTR amyloidosis  (visual score of 0 or H/CL ratio <1)    []  Strongly suggestive of ATTR amyloidosis  (visual score of 2 or 3 or H/CL ratio > 1.3)    []  Equivocal  (visual score of 1 or H/CL ratio 1-1.3)  IMPORTANT:  Equivocal results could represent AL amyloid or early ATTR cardiac amyloid.  The appropriate laboratory testing to rule out AL amyloidosis must be performed for the accurate interpretation of PYP imaging.    Additional Comments    CATH:  No results found for this or any previous visit.    BMP:     Glucose   Date Value Ref Range Status   08/01/2023 106 (H) 65 - 99 mg/dL Final     BUN   Date Value Ref Range Status    08/01/2023 34 (H) 8 - 23 mg/dL Final     Creatinine   Date Value Ref Range Status   08/01/2023 1.58 (H) 0.76 - 1.27 mg/dL Final     Sodium   Date Value Ref Range Status   08/01/2023 142 136 - 145 mmol/L Final     Potassium   Date Value Ref Range Status   08/01/2023 4.9 3.5 - 5.2 mmol/L Final     Chloride   Date Value Ref Range Status   08/01/2023 104 98 - 107 mmol/L Final     CO2   Date Value Ref Range Status   08/01/2023 25.7 22.0 - 29.0 mmol/L Final     Calcium   Date Value Ref Range Status   08/01/2023 9.2 8.2 - 9.6 mg/dL Final     BUN/Creatinine Ratio   Date Value Ref Range Status   08/01/2023 21.5 7.0 - 25.0 Final     Anion Gap   Date Value Ref Range Status   08/01/2023 12.3 5.0 - 15.0 mmol/L Final     eGFR   Date Value Ref Range Status   08/01/2023 40.0 (L) >60.0 mL/min/1.73 Final     LIPIDS:  Total Cholesterol   Date Value Ref Range Status   08/01/2023 130 0 - 200 mg/dL Final     Triglycerides   Date Value Ref Range Status   08/01/2023 61 0 - 150 mg/dL Final     HDL Cholesterol   Date Value Ref Range Status   08/01/2023 36 (L) 40 - 60 mg/dL Final     LDL Cholesterol    Date Value Ref Range Status   08/01/2023 81 0 - 100 mg/dL Final     VLDL Cholesterol   Date Value Ref Range Status   08/01/2023 13 5 - 40 mg/dL Final     LDL/HDL Ratio   Date Value Ref Range Status   08/01/2023 2.27  Final         Procedures             ASSESSMENT:  Diagnoses and all orders for this visit:    1. Essential hypertension (Primary)    2. Pedal edema    3. CHF (congestive heart failure), NYHA class II, chronic, diastolic    4. History of DVT (deep vein thrombosis)         PLAN:    1.  Blood pressures under excellent control.  They bring in the list and it runs very similar.  2.  Patient had cholesterol checked 8/1/2023 with LDL 81, HDL 36, and triglycerides 61.  These are under excellent control.  3.  Continue the Eliquis.  The patient notes no problems with bleeding.  4.  Patient has some right lower extremity edema but his left  leg looks good.  He is watching his salt and does wear tight fitting socks.    5.  Patient from a cardiac standpoint is overall doing well.  We will see back in 6 months, sooner if needed.      Return in about 6 months (around 8/26/2024).     Patient was given instructions and counseling regarding his condition or for health maintenance advice. Please see specific information pulled into the AVS if appropriate.         Anuel Pena MD   2/26/2024  09:14 EST

## 2024-03-04 ENCOUNTER — PATIENT OUTREACH (OUTPATIENT)
Dept: CASE MANAGEMENT | Facility: OTHER | Age: 89
End: 2024-03-04
Payer: MEDICARE

## 2024-03-04 ENCOUNTER — OFFICE VISIT (OUTPATIENT)
Dept: FAMILY MEDICINE CLINIC | Facility: CLINIC | Age: 89
End: 2024-03-04
Payer: MEDICARE

## 2024-03-04 VITALS
OXYGEN SATURATION: 100 % | SYSTOLIC BLOOD PRESSURE: 121 MMHG | WEIGHT: 191 LBS | HEART RATE: 76 BPM | DIASTOLIC BLOOD PRESSURE: 56 MMHG | TEMPERATURE: 97 F | HEIGHT: 71 IN | RESPIRATION RATE: 18 BRPM | BODY MASS INDEX: 26.74 KG/M2

## 2024-03-04 DIAGNOSIS — N40.1 BENIGN PROSTATIC HYPERPLASIA WITH LOWER URINARY TRACT SYMPTOMS, SYMPTOM DETAILS UNSPECIFIED: ICD-10-CM

## 2024-03-04 DIAGNOSIS — N32.81 OAB (OVERACTIVE BLADDER): ICD-10-CM

## 2024-03-04 DIAGNOSIS — N39.41 URGE INCONTINENCE: ICD-10-CM

## 2024-03-04 DIAGNOSIS — Z85.46 H/O PROSTATE CANCER: ICD-10-CM

## 2024-03-04 DIAGNOSIS — R29.898 WEAKNESS OF BOTH LOWER EXTREMITIES: ICD-10-CM

## 2024-03-04 DIAGNOSIS — Z85.46 HISTORY OF PROSTATE CANCER: ICD-10-CM

## 2024-03-04 DIAGNOSIS — I38 CHF DUE TO VALVULAR DISEASE: Primary | ICD-10-CM

## 2024-03-04 DIAGNOSIS — R00.1 BRADYCARDIA, SINUS: ICD-10-CM

## 2024-03-04 DIAGNOSIS — I10 ESSENTIAL HYPERTENSION: ICD-10-CM

## 2024-03-04 DIAGNOSIS — R73.03 PREDIABETES: ICD-10-CM

## 2024-03-04 DIAGNOSIS — I50.9 CHF DUE TO VALVULAR DISEASE: Primary | ICD-10-CM

## 2024-03-04 DIAGNOSIS — M81.0 AGE-RELATED OSTEOPOROSIS WITHOUT CURRENT PATHOLOGICAL FRACTURE: ICD-10-CM

## 2024-03-04 DIAGNOSIS — K59.01 SLOW TRANSIT CONSTIPATION: ICD-10-CM

## 2024-03-04 DIAGNOSIS — N40.0 BENIGN PROSTATIC HYPERPLASIA, UNSPECIFIED WHETHER LOWER URINARY TRACT SYMPTOMS PRESENT: ICD-10-CM

## 2024-03-04 DIAGNOSIS — M47.816 LUMBAR SPONDYLOSIS: ICD-10-CM

## 2024-03-04 NOTE — OUTREACH NOTE
AMBULATORY CASE MANAGEMENT NOTE    Name and Relationship of Patient/Support Person: [employer] [facility] [place] - Other    Care Coordination    PCP reports he had supported in past for a wheelchair but patient is in today for visit and needs a heavier duty wheelchair and needs foot rests on it as he is dragging his feet. He reports he placed orders and has a note in from today. Family unsure where current wheelchair came from he reports the daughter said maybe [employer].     Outreached to [employer] in [place] as there is a fax to them from 2022 about his original chair. Per Tiffany, he was given a chair in 2022 and insurance will only cover a new chair if it has been near or greater than 5 years since last chair. I asked if they could add foot rest to his original chair and she reports as long as they have some to fit his standard lightweight chair then they can.  Then she reports on his order form he was given a transfer chair and that comes with foot rest. She reports a lot of times family may remove them, but per their paperwork on file he does have foot rest with his original chair and he would not be able to upgrade until 2027 with insurance coverage.     Tiffany did offer private pay purchase of a standard wheelchair (what he qualifies for on his weight and height with foot rest) as a cost of $325 or they could rent one for $60 a month. She also offers to let family know to try the local [employer] stores or the hospice store in [place] as another option if they do not want to purchase a new one from them.     Patient Outreach    Left a detailed message for his daughter Tanisha at her cell listed on his contacts of above information. PCP updated as well.     Education Documentation  No documentation found.        Emelyn PONCE  Ambulatory Case Management    3/4/2024, 11:24 EST

## 2024-03-04 NOTE — PROGRESS NOTES
"Chief Complaint  Knee Pain (Ongoing), Back Pain, and Toe Pain (Ingrown toe nail, bilateral great toe pain.)    Subjective          Vladimir Carter presents to [facility]  Knee Pain     Back Pain    Toe Pain     Patient presents for follow-up visit not seen in quite a while on several medications for chronic conditions and has advanced age, hypertension on telmisartan spironolactone torsemide amlodipine metoprolol follows with cardiology on Eliquis additionally long-term for history of DVT and increased of clotting, has heart failure tricuspid valve disease additionally    Patient and daughter request if able to get larger, heavier duty wheelchair wider in seat with foot rests for chronic ongoing issues with weakness, fall risk, osteoporosis CHF and other. Has standard wheelchair but no foot rests and dragging his feet a lot when he is in it or uses        Objective   Vital Signs:   /56 (BP Location: Right arm, Patient Position: Sitting, Cuff Size: Adult)   Pulse 76   Temp 97 °F (36.1 °C) (Temporal)   Resp 18   Ht 180.3 cm (71\")   Wt 86.6 kg (191 lb)   SpO2 100%   BMI 26.64 kg/m²            Physical Exam  Vitals reviewed.   Constitutional:       Appearance: Normal appearance. He is well-developed.   HENT:      Head: Normocephalic and atraumatic.      Right Ear: External ear normal.      Left Ear: External ear normal.      Nose: Nose normal.   Eyes:      Conjunctiva/sclera: Conjunctivae normal.      Pupils: Pupils are equal, round, and reactive to light.   Cardiovascular:      Rate and Rhythm: Normal rate.   Pulmonary:      Effort: Pulmonary effort is normal.      Breath sounds: Normal breath sounds.   Abdominal:      General: There is no distension.   Skin:     General: Skin is warm and dry.   Neurological:      Mental Status: He is alert and oriented to person, place, and time. Mental status is at baseline.      Motor: Weakness present.   Psychiatric:         Mood and " Affect: Mood and affect normal.         Behavior: Behavior normal.         Thought Content: Thought content normal.         Judgment: Judgment normal.          Result Review :   The following data was reviewed by: Mango Kumar DO on 03/04/2024:  Common labs          5/12/2023    08:04 8/1/2023    07:54 2/1/2024    08:09   Common Labs   Glucose 110  106     BUN 25  34     Creatinine 1.71  1.58     Sodium 139  142     Potassium 4.5  4.9     Chloride 104  104     Calcium 9.3  9.2     Albumin 3.5  3.8     Total Bilirubin 0.5  0.3     Alkaline Phosphatase 72  66     AST (SGOT) 14  18     ALT (SGPT) 6  7     WBC  8.64     Hemoglobin  14.7     Hematocrit  45.3     Platelets  181     Total Cholesterol  130     Triglycerides  61     HDL Cholesterol  36     LDL Cholesterol   81     Hemoglobin A1C  6.10     PSA   <0.014      Data reviewed : Cardiology studies recent studies and notes from specialist               Assessment and Plan    Diagnoses and all orders for this visit:    1. 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 Essential hypertension    3. Weakness of both lower extremities  -     Standard Wheelchair    4. Lumbar spondylosis  -     Standard Wheelchair    5. H/O prostate cancer    6. Prediabetes  -     Hemoglobin A1c; Future  -     CBC Auto Differential; Future  -     Comprehensive Metabolic Panel; Future  -     Lipid Panel; Future  -     Vitamin B12 & Folate; Future  -     Microalbumin / Creatinine Urine Ratio - Urine, Clean Catch; Future    7. Age-related osteoporosis without current pathological fracture    8. Slow transit constipation    9. Bradycardia, sinus    10. Benign prostatic hyperplasia, unspecified whether lower urinary tract  symptoms present    11. Urge incontinence    12. History of prostate cancer    13. OAB (overactive bladder)    14. Benign prostatic hyperplasia with lower urinary tract symptoms, symptom details unspecified        Continue medicines as prescribed and follow-up with specialist for heart failure other chronic conditions continue to recheck labs every 3 to 6 months if not more frequently to monitor kidney function, history of BPH PSA elevation additionally    Recheck labs ordered today to do this month    Wheelchair, heavier duty ordered recommended wider seat and foot rests, will document necessity and order, prescribe send to DME  *has standard smaller wheelchair as detailed in HPI but needs more room and foot rests to be more accommodating for patient    Patient is able to lift and transfer from upper extremity has lower leg weakness and needs assistance with wheelchair, heavier and wider than standard with foot rests for patient, so able to get in and out of the with ease and legs supported, more room for patient size     Has assistance with family to be able to use and transport him and wheelchair to appointments and other.       Follow Up   Return in about 6 months (around 9/4/2024), or if symptoms worsen or fail to improve, for Next scheduled follow up, AWV, Labs before.  Patient was given instructions and counseling regarding his condition or for health maintenance advice. Please see specific information pulled into the AVS if appropriate.     Transcribed from ambient dictation for Mango Kumar DO by Mango Kumar DO.  03/04/24   09:35 EST

## 2024-03-06 ENCOUNTER — TELEPHONE (OUTPATIENT)
Dept: CASE MANAGEMENT | Facility: OTHER | Age: 89
End: 2024-03-06
Payer: MEDICARE

## 2024-03-06 ENCOUNTER — PATIENT OUTREACH (OUTPATIENT)
Dept: CASE MANAGEMENT | Facility: OTHER | Age: 89
End: 2024-03-06
Payer: MEDICARE

## 2024-03-06 NOTE — TELEPHONE ENCOUNTER
Left another message for daughter Tanisha about what I learned about wheelchair order and needs in previous message form 3/4/24 and requested a call back to office for me if she has any other needs or questions. If no response by next week will close out HRCM for goals met.

## 2024-03-11 ENCOUNTER — OFFICE VISIT (OUTPATIENT)
Dept: PODIATRY | Facility: CLINIC | Age: 89
End: 2024-03-11
Payer: MEDICARE

## 2024-03-11 VITALS
HEART RATE: 58 BPM | HEIGHT: 71 IN | TEMPERATURE: 96.9 F | WEIGHT: 191 LBS | OXYGEN SATURATION: 93 % | DIASTOLIC BLOOD PRESSURE: 75 MMHG | BODY MASS INDEX: 26.74 KG/M2 | SYSTOLIC BLOOD PRESSURE: 147 MMHG

## 2024-03-11 DIAGNOSIS — M79.671 FOOT PAIN, BILATERAL: Primary | ICD-10-CM

## 2024-03-11 DIAGNOSIS — B35.1 ONYCHOMYCOSIS: ICD-10-CM

## 2024-03-11 DIAGNOSIS — L60.0 ONYCHOCRYPTOSIS: ICD-10-CM

## 2024-03-11 DIAGNOSIS — R26.2 DIFFICULTY WALKING: ICD-10-CM

## 2024-03-11 DIAGNOSIS — M79.672 FOOT PAIN, BILATERAL: Primary | ICD-10-CM

## 2024-03-11 PROCEDURE — 1159F MED LIST DOCD IN RCRD: CPT | Performed by: PODIATRIST

## 2024-03-11 PROCEDURE — 11721 DEBRIDE NAIL 6 OR MORE: CPT | Performed by: PODIATRIST

## 2024-03-11 PROCEDURE — 1160F RVW MEDS BY RX/DR IN RCRD: CPT | Performed by: PODIATRIST

## 2024-03-11 NOTE — PROGRESS NOTES
[facility] - PODIATRY    Today's Date: 03/11/24    Patient Name: Vladimir Carter  MRN: [id number]  CSN: [id number]  PCP: Mango Kumar DO, Last PCP Visit: 3/4/2024  Referring Provider: No ref. provider found    SUBJECTIVE     Chief Complaint   Patient presents with    Left Foot - Follow-up, Nail Problem    Right Foot - Follow-up, Nail Problem     HPI: Vladimir Carter, a 89 y.o.male, comes to clinic.    New, Established, New Problem:  established   Location:  Toenails  Duration:   Greater than five years  Onset:  Gradual  Nature:  sore with palpation.  Stable, worsening, improving:   Stable  Aggravating factors:  Pain with shoe gear and ambulation.  Previous Treatment:  debridement    Medical changes:  none    Patient denies any fevers, chills, nausea, vomiting, shortness of breathe, nor any other constitutional signs nor symptoms.         Past Medical History:   Diagnosis Date    Abnormal bone density screening 09/29/2019    Spine-Osteopenia Hps-Osteoporosis    Benign non-nodular prostatic hyperplasia with lower urinary tract symptoms 10/29/2018    Bunion     CAD (coronary artery disease)     Callus     Chronic pain syndrome     CKD (chronic kidney disease) stage 3, GFR 30-59 ml/min     GERD without esophagitis 09/16/2016    H/O prostate cancer     HTN (hypertension)     Hyperlipidemia     Ingrown toenail     Long term (current) use of anticoagulants     Lupus anticoagulant disorder     Moderate exercise     Active but no formal exercise    BENJA (obstructive sleep apnea)     Osteoarthritis of left knee 04/22/2016    end-stage    Osteoporosis     Osteoporosis in the hips/femur no fracture    Overweight (BMI 25.0-29.9)     Pedal edema 07/12/2021    Pulmonary HTN     Severe tricuspid regurgitation 05/2018    ECHO (TTE) Diastolic dysfunction, Pulmonary HTN and Severe Tricuspid regurgitation    Tinea unguium 01/24/2019     Past Surgical History:   Procedure Laterality Date    CHOLECYSTECTOMY       COLONOSCOPY      CYSTOSCOPY      HERNIA REPAIR      OTHER SURGICAL HISTORY      Brachytherapy    REPLACEMENT TOTAL KNEE Left 05/10/2017    REPLACEMENT TOTAL KNEE Right     WRIST SURGERY       Family History   Problem Relation Age of Onset    Heart disease Mother     Hypertension Mother     Breast cancer Daughter         Breast Neoplasm, Malignant    Cancer Daughter         Unspecified    Diabetes Daughter         Unspecified type     Social History     Socioeconomic History    Marital status:    Tobacco Use    Smoking status: Former     Current packs/day: 0.00     Average packs/day: 2.0 packs/day for 60.0 years (120.0 ttl pk-yrs)     Types: Cigarettes, Cigars     Start date: 1949     Quit date: 2009     Years since quittin.5     Passive exposure: Never    Smokeless tobacco: Never   Vaping Use    Vaping status: Never Used   Substance and Sexual Activity    Alcohol use: Never    Drug use: Never    Sexual activity: Not Currently     Partners: Female     Birth control/protection: None     No Known Allergies  Current Outpatient Medications   Medication Sig Dispense Refill    ALBUTEROL IN As Needed.      alendronate (Fosamax) 70 MG tablet Take 1 tablet by mouth Every 7 (Seven) Days. 52 tablet 1    amLODIPine (NORVASC) 2.5 MG tablet Take 1 tablet by mouth Daily. 90 tablet 1    CBD (cannabidiol) oral oil Take  by mouth.      cetirizine (zyrTEC) 10 MG tablet Take 1 tablet by mouth Daily. Indications: Hayfever 90 tablet 3    Cholecalciferol 50 MCG (2000 UT) capsule Vitamin D3 2,000 unit oral capsule take 1 capsule by oral route daily   Suspended      DHA-EPA-FLAXSEED OIL-VITAMIN E PO Take  by mouth.      docusate sodium (COLACE) 100 MG capsule Take 1 capsule by mouth 2 (Two) Times a Day. Indications: Constipation 180 capsule 3    Eliquis 2.5 MG tablet tablet Take 1 tablet by mouth 2 (Two) Times a Day. 180 tablet 1    empagliflozin (Jardiance) 10 MG tablet tablet Take 1 tablet by mouth Daily. 90 tablet 3     fluticasone (FLONASE) 50 MCG/ACT nasal spray into the nostril(s) as directed by provider.      ipratropium (ATROVENT) 0.06 % nasal spray 2 sprays into the nostril(s) as directed by provider 4 (Four) Times a Day. 15 mL 5    metoprolol succinate XL (TOPROL-XL) 50 MG 24 hr tablet Take 1 tablet by mouth Daily. 90 tablet 3    multivitamin with minerals tablet tablet multivitamin oral tablet take 1 tablet by oral route daily   Suspended      pantoprazole (PROTONIX) 40 MG EC tablet Take 1 tablet by mouth Daily. 90 tablet 1    Refresh Celluvisc 1 % gel eye gel       Restasis MultiDose 0.05 % ophthalmic emulsion Daily.      spironolactone (Aldactone) 50 MG tablet Take 1 tablet by mouth Daily. Take 1 tablet every day except on  90 tablet 3    tamsulosin (FLOMAX) 0.4 MG capsule 24 hr capsule Take 1 capsule by mouth Daily. 90 capsule 4    telmisartan (MICARDIS) 80 MG tablet Take 1 tablet by mouth Daily. 90 tablet 3    torsemide (Demadex) 20 MG tablet Take 1 tablet by mouth Daily. 90 tablet 3    Vibegron 75 MG tablet Take 1 tablet by mouth Daily for 300 days. 60 tablet 4     No current facility-administered medications for this visit.     Review of Systems   Constitutional: Negative.    Skin:         Painful toenails.   All other systems reviewed and are negative.      OBJECTIVE     Vitals:    24 0817   BP: 147/75   Pulse: 58   Temp: 96.9 °F (36.1 °C)   SpO2: 93%         Patient seen in no apparent distress.      PHYSICAL EXAM:     Foot/Ankle Exam    GENERAL  Appearance:  elderly  Orientation:  AAOx3  Affect:  appropriate  Gait:  antalgic  Assistance:  wheelchair  Right shoe gear: casual shoe  Left shoe gear: casual shoe    VASCULAR     Right Foot Vascularity   Dorsalis pedis:  1+  Posterior tibial:  1+  Skin temperature:  cool  Edema gradin+ and pitting  CFT:  < 3 seconds  Pedal hair growth:  Present  Varicosities:  mild varicosities     Left Foot Vascularity   Dorsalis pedis:  2+  Posterior tibial:   1+  Skin temperature:  cool  Edema gradin+ and pitting  CFT:  < 3 seconds  Pedal hair growth:  Present  Varicosities:  mild varicosities     NEUROLOGIC     Right Foot Neurologic   Normal sensation    Light touch sensation: normal  Vibratory sensation: normal  Hot/Cold sensation: normal  Protective Sensation using [place]-Martin Monofilament:   Sites intact: 10     Left Foot Neurologic   Normal sensation    Light touch sensation: normal  Vibratory sensation: normal  Hot/Cold sensation:  normal  Protective Sensation using [place]-Martin Monofilament:   Sites intact: 10  Sites tested: 10    MUSCLE STRENGTH     Right Foot Muscle Strength   Foot dorsiflexion:  4-  Foot plantar flexion:  4-  Foot inversion:  4-  Foot eversion:  4-     Left Foot Muscle Strength   Foot dorsiflexion:  4-  Foot plantar flexion:  4-  Foot inversion:  4-  Foot eversion:  4-    RANGE OF MOTION     Right Foot Range of Motion   Foot and ankle ROM within normal limits       Left Foot Range of Motion   Foot and ankle ROM within normal limits      DERMATOLOGIC      Right Foot Dermatologic   Skin  Right foot skin is intact.   Nails  1.  Positive for elongated, onychomycosis, abnormal thickness, subungual debris and ingrown toenail.  2.  Positive for elongated, onychomycosis, abnormal thickness, subungual debris and ingrown toenail.  3.  Positive for elongated, onychomycosis, abnormal thickness, subungual debris and ingrown toenail.  4.  Positive for elongated, onychomycosis, abnormal thickness, subungual debris and ingrown toenail.  5.  Positive for elongated, onychomycosis, abnormal thickness, subungual debris and ingrown toenail.  Nails comment:  Toenails 1, 2, 3, 4, and 5     Left Foot Dermatologic   Skin  Left foot skin is intact.   Nails comment:  Toenails 1, 2, 3, 4, and 5  Nails  1.  Positive for elongated, onychomycosis, abnormal thickness, subungual debris and ingrown toenail.  2.  Positive for elongated, onychomycosis, abnormal  thickness, subungual debris and ingrown toenail.  3.  Positive for elongated, onychomycosis, abnormal thickness, subungual debris and ingrown toenail.  4.  Positive for elongated, onychomycosis, abnormally thick, subungual debris and ingrown toenail.  5.  Positive for elongated, onychomycosis, abnormally thick, subungual debris and ingrown toenail.    I have reexamined the patient the results are consistent with the previously documented exam.    ASSESSMENT/PLAN     Diagnoses and all orders for this visit:    1. Foot pain, bilateral (Primary)    2. Difficulty walking    3. Onychomycosis    4. Onychocryptosis    Comprehensive lower extremity examination and evaluation was performed.    Discussed findings and treatment plan including risks, benefits, and treatment options with patient in detail. Patient agreed with treatment plan.    Toenails 1 through 5 bilaterally were debrided in thickness and length and then smoothed with a Dremel Tool.  Tolerated the procedure well without complications.    An After Visit Summary was printed and given to the patient at discharge, including (if requested) any available informative/educational handouts regarding diagnosis, treatment, or medications. All questions were answered to patient/family satisfaction. Should symptoms fail to improve or worsen they agree to call or return to clinic or to go to the Emergency Department. Discussed the importance of following up with any needed screening tests/labs/specialist appointments and any requested follow-up recommended by me today. Importance of maintaining follow-up discussed and patient accepts that missed appointments can delay diagnosis and potentially lead to worsening of conditions.    Return in about 9 weeks (around 5/13/2024) for Toenail Care., or sooner if acute issues arise.    This document has been electronically signed by Enzo Ferrell DPM on March 11, 2024 08:47 EDT

## 2024-03-19 ENCOUNTER — LAB (OUTPATIENT)
Dept: LAB | Facility: HOSPITAL | Age: 89
End: 2024-03-19
Payer: MEDICARE

## 2024-03-19 DIAGNOSIS — I50.9 CHF DUE TO VALVULAR DISEASE: ICD-10-CM

## 2024-03-19 DIAGNOSIS — R73.03 PREDIABETES: ICD-10-CM

## 2024-03-19 DIAGNOSIS — I38 CHF DUE TO VALVULAR DISEASE: ICD-10-CM

## 2024-03-19 LAB
ALBUMIN SERPL-MCNC: 3.6 G/DL (ref 3.5–5.2)
ALBUMIN UR-MCNC: <1.2 MG/DL
ALBUMIN/GLOB SERPL: 1.4 G/DL
ALP SERPL-CCNC: 66 U/L (ref 39–117)
ALT SERPL W P-5'-P-CCNC: <5 U/L (ref 1–41)
ANION GAP SERPL CALCULATED.3IONS-SCNC: 10.5 MMOL/L (ref 5–15)
AST SERPL-CCNC: 15 U/L (ref 1–40)
BASOPHILS # BLD AUTO: 0.05 10*3/MM3 (ref 0–0.2)
BASOPHILS NFR BLD AUTO: 0.7 % (ref 0–1.5)
BILIRUB SERPL-MCNC: 0.4 MG/DL (ref 0–1.2)
BUN SERPL-MCNC: 18 MG/DL (ref 8–23)
BUN/CREAT SERPL: 11.4 (ref 7–25)
CALCIUM SPEC-SCNC: 9 MG/DL (ref 8.2–9.6)
CHLORIDE SERPL-SCNC: 107 MMOL/L (ref 98–107)
CHOLEST SERPL-MCNC: 128 MG/DL (ref 0–200)
CO2 SERPL-SCNC: 22.5 MMOL/L (ref 22–29)
CREAT SERPL-MCNC: 1.58 MG/DL (ref 0.76–1.27)
CREAT UR-MCNC: 98.4 MG/DL
DEPRECATED RDW RBC AUTO: 43.5 FL (ref 37–54)
EGFRCR SERPLBLD CKD-EPI 2021: 39.8 ML/MIN/1.73
EOSINOPHIL # BLD AUTO: 0.17 10*3/MM3 (ref 0–0.4)
EOSINOPHIL NFR BLD AUTO: 2.3 % (ref 0.3–6.2)
ERYTHROCYTE [DISTWIDTH] IN BLOOD BY AUTOMATED COUNT: 12.9 % (ref 12.3–15.4)
FOLATE SERPL-MCNC: 12.5 NG/ML (ref 4.78–24.2)
GLOBULIN UR ELPH-MCNC: 2.6 GM/DL
GLUCOSE SERPL-MCNC: 107 MG/DL (ref 65–99)
HBA1C MFR BLD: 6.1 % (ref 4.8–5.6)
HCT VFR BLD AUTO: 42.5 % (ref 37.5–51)
HDLC SERPL-MCNC: 39 MG/DL (ref 40–60)
HGB BLD-MCNC: 14.1 G/DL (ref 13–17.7)
IMM GRANULOCYTES # BLD AUTO: 0.01 10*3/MM3 (ref 0–0.05)
IMM GRANULOCYTES NFR BLD AUTO: 0.1 % (ref 0–0.5)
LDLC SERPL CALC-MCNC: 75 MG/DL (ref 0–100)
LDLC/HDLC SERPL: 1.94 {RATIO}
LYMPHOCYTES # BLD AUTO: 2.02 10*3/MM3 (ref 0.7–3.1)
LYMPHOCYTES NFR BLD AUTO: 27.5 % (ref 19.6–45.3)
MCH RBC QN AUTO: 30.8 PG (ref 26.6–33)
MCHC RBC AUTO-ENTMCNC: 33.2 G/DL (ref 31.5–35.7)
MCV RBC AUTO: 92.8 FL (ref 79–97)
MICROALBUMIN/CREAT UR: NORMAL MG/G{CREAT}
MONOCYTES # BLD AUTO: 0.76 10*3/MM3 (ref 0.1–0.9)
MONOCYTES NFR BLD AUTO: 10.4 % (ref 5–12)
NEUTROPHILS NFR BLD AUTO: 4.33 10*3/MM3 (ref 1.7–7)
NEUTROPHILS NFR BLD AUTO: 59 % (ref 42.7–76)
NRBC BLD AUTO-RTO: 0 /100 WBC (ref 0–0.2)
PLATELET # BLD AUTO: 207 10*3/MM3 (ref 140–450)
PMV BLD AUTO: 10.3 FL (ref 6–12)
POTASSIUM SERPL-SCNC: 4.5 MMOL/L (ref 3.5–5.2)
PROT SERPL-MCNC: 6.2 G/DL (ref 6–8.5)
RBC # BLD AUTO: 4.58 10*6/MM3 (ref 4.14–5.8)
SODIUM SERPL-SCNC: 140 MMOL/L (ref 136–145)
TRIGL SERPL-MCNC: 67 MG/DL (ref 0–150)
VIT B12 BLD-MCNC: 472 PG/ML (ref 211–946)
VLDLC SERPL-MCNC: 14 MG/DL (ref 5–40)
WBC NRBC COR # BLD AUTO: 7.34 10*3/MM3 (ref 3.4–10.8)

## 2024-03-19 PROCEDURE — 82043 UR ALBUMIN QUANTITATIVE: CPT

## 2024-03-19 PROCEDURE — 80061 LIPID PANEL: CPT

## 2024-03-19 PROCEDURE — 82746 ASSAY OF FOLIC ACID SERUM: CPT

## 2024-03-19 PROCEDURE — 80053 COMPREHEN METABOLIC PANEL: CPT

## 2024-03-19 PROCEDURE — 83036 HEMOGLOBIN GLYCOSYLATED A1C: CPT

## 2024-03-19 PROCEDURE — 82570 ASSAY OF URINE CREATININE: CPT

## 2024-03-19 PROCEDURE — 85025 COMPLETE CBC W/AUTO DIFF WBC: CPT

## 2024-03-19 PROCEDURE — 82607 VITAMIN B-12: CPT

## 2024-03-19 PROCEDURE — 36415 COLL VENOUS BLD VENIPUNCTURE: CPT

## 2024-04-03 DIAGNOSIS — M81.0 AGE-RELATED OSTEOPOROSIS WITHOUT CURRENT PATHOLOGICAL FRACTURE: ICD-10-CM

## 2024-04-03 DIAGNOSIS — J30.1 SEASONAL ALLERGIC RHINITIS DUE TO POLLEN: ICD-10-CM

## 2024-04-03 DIAGNOSIS — R00.1 BRADYCARDIA, SINUS: ICD-10-CM

## 2024-04-03 DIAGNOSIS — I10 ESSENTIAL HYPERTENSION: ICD-10-CM

## 2024-04-03 DIAGNOSIS — K59.01 SLOW TRANSIT CONSTIPATION: ICD-10-CM

## 2024-04-03 RX ORDER — PANTOPRAZOLE SODIUM 40 MG/1
40 TABLET, DELAYED RELEASE ORAL DAILY
Qty: 90 TABLET | Refills: 1 | Status: SHIPPED | OUTPATIENT
Start: 2024-04-03

## 2024-04-03 RX ORDER — APIXABAN 2.5 MG/1
2.5 TABLET, FILM COATED ORAL 2 TIMES DAILY
Qty: 180 TABLET | Refills: 3 | Status: SHIPPED | OUTPATIENT
Start: 2024-04-03

## 2024-04-03 RX ORDER — CETIRIZINE HYDROCHLORIDE 10 MG/1
10 TABLET ORAL DAILY
Qty: 90 TABLET | Refills: 3 | Status: SHIPPED | OUTPATIENT
Start: 2024-04-03

## 2024-04-03 RX ORDER — AMLODIPINE BESYLATE 2.5 MG/1
2.5 TABLET ORAL DAILY
Qty: 90 TABLET | Refills: 3 | Status: SHIPPED | OUTPATIENT
Start: 2024-04-03

## 2024-04-03 RX ORDER — ALENDRONATE SODIUM 70 MG/1
70 TABLET ORAL
Qty: 52 TABLET | Refills: 1 | Status: SHIPPED | OUTPATIENT
Start: 2024-04-03

## 2024-04-03 RX ORDER — DOCUSATE SODIUM 100 MG/1
100 CAPSULE, LIQUID FILLED ORAL 2 TIMES DAILY
Qty: 180 CAPSULE | Refills: 3 | Status: SHIPPED | OUTPATIENT
Start: 2024-04-03

## 2024-04-04 RX ORDER — TELMISARTAN 80 MG/1
80 TABLET ORAL DAILY
Qty: 90 TABLET | Refills: 3 | Status: SHIPPED | OUTPATIENT
Start: 2024-04-04

## 2024-04-04 RX ORDER — METOPROLOL SUCCINATE 50 MG/1
50 TABLET, EXTENDED RELEASE ORAL DAILY
Qty: 90 TABLET | Refills: 3 | Status: SHIPPED | OUTPATIENT
Start: 2024-04-04

## 2024-04-04 RX ORDER — TORSEMIDE 20 MG/1
20 TABLET ORAL DAILY
Qty: 90 TABLET | Refills: 3 | Status: SHIPPED | OUTPATIENT
Start: 2024-04-04

## 2024-04-04 RX ORDER — SPIRONOLACTONE 50 MG/1
50 TABLET, FILM COATED ORAL DAILY
Qty: 90 TABLET | Refills: 3 | Status: SHIPPED | OUTPATIENT
Start: 2024-04-04

## 2024-05-21 ENCOUNTER — OFFICE VISIT (OUTPATIENT)
Dept: PODIATRY | Facility: CLINIC | Age: 89
End: 2024-05-21
Payer: MEDICARE

## 2024-05-21 VITALS
DIASTOLIC BLOOD PRESSURE: 54 MMHG | SYSTOLIC BLOOD PRESSURE: 138 MMHG | WEIGHT: 191 LBS | TEMPERATURE: 97.5 F | HEIGHT: 71 IN | BODY MASS INDEX: 26.74 KG/M2 | HEART RATE: 61 BPM | OXYGEN SATURATION: 94 %

## 2024-05-21 DIAGNOSIS — M79.671 FOOT PAIN, BILATERAL: Primary | ICD-10-CM

## 2024-05-21 DIAGNOSIS — B35.1 ONYCHOMYCOSIS: ICD-10-CM

## 2024-05-21 DIAGNOSIS — M79.672 FOOT PAIN, BILATERAL: Primary | ICD-10-CM

## 2024-05-21 DIAGNOSIS — L60.0 ONYCHOCRYPTOSIS: ICD-10-CM

## 2024-05-21 DIAGNOSIS — R26.2 DIFFICULTY WALKING: ICD-10-CM

## 2024-05-21 NOTE — PROGRESS NOTES
[facility] - PODIATRY    Today's Date: 05/21/24    Patient Name: Vladimir Carter  MRN: [id number]  CSN: [id number]  PCP: Mango Kumar DO, Last PCP Visit: 3/4/2024  Referring Provider: No ref. provider found    SUBJECTIVE     Chief Complaint   Patient presents with    Left Foot - Follow-up, Nail Problem    Right Foot - Follow-up, Nail Problem     HPI: Vladimir Carter, a 89 y.o.male, comes to clinic.    New, Established, New Problem:  established   Location:  Toenails  Duration:   Greater than five years  Onset:  Gradual  Nature:  sore with palpation.  Stable, worsening, improving:   Stable  Aggravating factors:  Pain with shoe gear and ambulation.  Previous Treatment:  debridement    Medical changes:  no changes    Patient denies any fevers, chills, nausea, vomiting, shortness of breathe, nor any other constitutional signs nor symptoms.       I have reviewed/confirmed previously documented HPI with no changes.       Past Medical History:   Diagnosis Date    Abnormal bone density screening 09/29/2019    Spine-Osteopenia Hps-Osteoporosis    Benign non-nodular prostatic hyperplasia with lower urinary tract symptoms 10/29/2018    Bunion     CAD (coronary artery disease)     Callus     Chronic pain syndrome     CKD (chronic kidney disease) stage 3, GFR 30-59 ml/min     GERD without esophagitis 09/16/2016    H/O prostate cancer     HTN (hypertension)     Hyperlipidemia     Ingrown toenail     Long term (current) use of anticoagulants     Lupus anticoagulant disorder     Moderate exercise     Active but no formal exercise    BENJA (obstructive sleep apnea)     Osteoarthritis of left knee 04/22/2016    end-stage    Osteoporosis     Osteoporosis in the hips/femur no fracture    Overweight (BMI 25.0-29.9)     Pedal edema 07/12/2021    Pulmonary HTN     Severe tricuspid regurgitation 05/2018    ECHO (TTE) Diastolic dysfunction, Pulmonary HTN and Severe Tricuspid regurgitation    Tinea unguium 01/24/2019      Past Surgical History:   Procedure Laterality Date    CHOLECYSTECTOMY      COLONOSCOPY      CYSTOSCOPY      HERNIA REPAIR      OTHER SURGICAL HISTORY      Brachytherapy    REPLACEMENT TOTAL KNEE Left 05/10/2017    REPLACEMENT TOTAL KNEE Right     WRIST SURGERY       Family History   Problem Relation Age of Onset    Heart disease Mother     Hypertension Mother     Breast cancer Daughter         Breast Neoplasm, Malignant    Cancer Daughter         Unspecified    Diabetes Daughter         Unspecified type     Social History     Socioeconomic History    Marital status:    Tobacco Use    Smoking status: Former     Current packs/day: 0.00     Average packs/day: 2.0 packs/day for 60.0 years (120.0 ttl pk-yrs)     Types: Cigarettes, Cigars     Start date: 1949     Quit date: 2009     Years since quittin.7     Passive exposure: Never    Smokeless tobacco: Never   Vaping Use    Vaping status: Never Used   Substance and Sexual Activity    Alcohol use: Never    Drug use: Never    Sexual activity: Not Currently     Partners: Female     Birth control/protection: None     No Known Allergies  Current Outpatient Medications   Medication Sig Dispense Refill    ALBUTEROL IN As Needed.      alendronate (Fosamax) 70 MG tablet Take 1 tablet by mouth Every 7 (Seven) Days. 52 tablet 1    amLODIPine (NORVASC) 2.5 MG tablet Take 1 tablet by mouth Daily. 90 tablet 3    CBD (cannabidiol) oral oil Take  by mouth.      cetirizine (zyrTEC) 10 MG tablet Take 1 tablet by mouth Daily. Indications: Hayfever 90 tablet 3    Cholecalciferol 50 MCG (2000 UT) capsule Vitamin D3 2,000 unit oral capsule take 1 capsule by oral route daily   Suspended      DHA-EPA-FLAXSEED OIL-VITAMIN E PO Take  by mouth.      docusate sodium (COLACE) 100 MG capsule Take 1 capsule by mouth 2 (Two) Times a Day. Indications: Constipation 180 capsule 3    Eliquis 2.5 MG tablet tablet Take 1 tablet by mouth 2 (Two) Times a Day. 180 tablet 3     empagliflozin (Jardiance) 10 MG tablet tablet Take 1 tablet by mouth Daily. 90 tablet 3    fluticasone (FLONASE) 50 MCG/ACT nasal spray into the nostril(s) as directed by provider.      ipratropium (ATROVENT) 0.06 % nasal spray 2 sprays into the nostril(s) as directed by provider 4 (Four) Times a Day. 15 mL 5    metoprolol succinate XL (TOPROL-XL) 50 MG 24 hr tablet Take 1 tablet by mouth Daily. 90 tablet 3    multivitamin with minerals tablet tablet multivitamin oral tablet take 1 tablet by oral route daily   Suspended      pantoprazole (PROTONIX) 40 MG EC tablet Take 1 tablet by mouth Daily. 90 tablet 1    Refresh Celluvisc 1 % gel eye gel       Restasis MultiDose 0.05 % ophthalmic emulsion Daily.      spironolactone (Aldactone) 50 MG tablet Take 1 tablet by mouth Daily. Take 1 tablet every day except on  90 tablet 3    tamsulosin (FLOMAX) 0.4 MG capsule 24 hr capsule Take 1 capsule by mouth Daily. 90 capsule 4    telmisartan (MICARDIS) 80 MG tablet Take 1 tablet by mouth Daily. 90 tablet 3    torsemide (Demadex) 20 MG tablet Take 1 tablet by mouth Daily. 90 tablet 3    Vibegron 75 MG tablet Take 1 tablet by mouth Daily for 300 days. 60 tablet 4     No current facility-administered medications for this visit.     Review of Systems   Constitutional: Negative.    Skin:         Painful toenails.   All other systems reviewed and are negative.      OBJECTIVE     Vitals:    24 0739   BP: 138/54   Pulse: 61   Temp: 97.5 °F (36.4 °C)   SpO2: 94%         Patient seen in no apparent distress.      PHYSICAL EXAM:     Foot/Ankle Exam    GENERAL  Appearance:  elderly  Orientation:  AAOx3  Affect:  appropriate  Gait:  antalgic  Assistance:  wheelchair  Right shoe gear: casual shoe  Left shoe gear: casual shoe    VASCULAR     Right Foot Vascularity   Dorsalis pedis:  1+  Posterior tibial:  1+  Skin temperature:  cool  Edema gradin+ and pitting  CFT:  < 3 seconds  Pedal hair growth:  Present  Varicosities:  mild  varicosities     Left Foot Vascularity   Dorsalis pedis:  2+  Posterior tibial:  1+  Skin temperature:  cool  Edema gradin+ and pitting  CFT:  < 3 seconds  Pedal hair growth:  Present  Varicosities:  mild varicosities     NEUROLOGIC     Right Foot Neurologic   Normal sensation    Light touch sensation: normal  Vibratory sensation: normal  Hot/Cold sensation: normal  Protective Sensation using [place]-Martin Monofilament:   Sites intact: 10     Left Foot Neurologic   Normal sensation    Light touch sensation: normal  Vibratory sensation: normal  Hot/Cold sensation:  normal  Protective Sensation using [place]-Martin Monofilament:   Sites intact: 10  Sites tested: 10    MUSCLE STRENGTH     Right Foot Muscle Strength   Foot dorsiflexion:  4-  Foot plantar flexion:  4-  Foot inversion:  4-  Foot eversion:  4-     Left Foot Muscle Strength   Foot dorsiflexion:  4-  Foot plantar flexion:  4-  Foot inversion:  4-  Foot eversion:  4-    RANGE OF MOTION     Right Foot Range of Motion   Foot and ankle ROM within normal limits       Left Foot Range of Motion   Foot and ankle ROM within normal limits      DERMATOLOGIC      Right Foot Dermatologic   Skin  Right foot skin is intact.   Nails  1.  Positive for elongated, onychomycosis, abnormal thickness, subungual debris and ingrown toenail.  2.  Positive for elongated, onychomycosis, abnormal thickness, subungual debris and ingrown toenail.  3.  Positive for elongated, onychomycosis, abnormal thickness, subungual debris and ingrown toenail.  4.  Positive for elongated, onychomycosis, abnormal thickness, subungual debris and ingrown toenail.  5.  Positive for elongated, onychomycosis, abnormal thickness, subungual debris and ingrown toenail.  Nails comment:  Toenails 1, 2, 3, 4, and 5     Left Foot Dermatologic   Skin  Left foot skin is intact.   Nails comment:  Toenails 1, 2, 3, 4, and 5  Nails  1.  Positive for elongated, onychomycosis, abnormal thickness, subungual  debris and ingrown toenail.  2.  Positive for elongated, onychomycosis, abnormal thickness, subungual debris and ingrown toenail.  3.  Positive for elongated, onychomycosis, abnormal thickness, subungual debris and ingrown toenail.  4.  Positive for elongated, onychomycosis, abnormally thick, subungual debris and ingrown toenail.  5.  Positive for elongated, onychomycosis, abnormally thick, subungual debris and ingrown toenail.    I have reexamined the patient the results are consistent with the previously documented exam.    ASSESSMENT/PLAN     Diagnoses and all orders for this visit:    1. Foot pain, bilateral (Primary)    2. Difficulty walking    3. Onychomycosis    4. Onychocryptosis    Comprehensive lower extremity examination and evaluation was performed.    Discussed findings and treatment plan including risks, benefits, and treatment options with patient in detail. Patient agreed with treatment plan.    Toenails 1 through 5 bilaterally were debrided in thickness and length and then smoothed with a Dremel Tool.  Tolerated the procedure well without complications.    An After Visit Summary was printed and given to the patient at discharge, including (if requested) any available informative/educational handouts regarding diagnosis, treatment, or medications. All questions were answered to patient/family satisfaction. Should symptoms fail to improve or worsen they agree to call or return to clinic or to go to the Emergency Department. Discussed the importance of following up with any needed screening tests/labs/specialist appointments and any requested follow-up recommended by me today. Importance of maintaining follow-up discussed and patient accepts that missed appointments can delay diagnosis and potentially lead to worsening of conditions.    Return in about 9 weeks (around 7/23/2024) for Toenail Care., or sooner if acute issues arise.    I have reviewed the assessment and plan and verified the accuracy of  it. No changes to assessment and plan since the information was documented. Enzo Ferrell DPM 05/21/24     I have dictated this note utilizing Dragon Dictation.  Please note that portions of this note were completed with a voice recognition program.  Part of this note may be an electronic transcription/translation of spoken language to printed text using the Dragon Dictation System.          This document has been electronically signed by Enzo Ferrell DPM on May 21, 2024 07:58 EDT

## 2024-07-12 DIAGNOSIS — N40.0 BENIGN PROSTATIC HYPERPLASIA, UNSPECIFIED WHETHER LOWER URINARY TRACT SYMPTOMS PRESENT: ICD-10-CM

## 2024-07-15 DIAGNOSIS — N40.0 BENIGN PROSTATIC HYPERPLASIA, UNSPECIFIED WHETHER LOWER URINARY TRACT SYMPTOMS PRESENT: ICD-10-CM

## 2024-07-15 RX ORDER — TAMSULOSIN HYDROCHLORIDE 0.4 MG/1
1 CAPSULE ORAL DAILY
Qty: 90 CAPSULE | Refills: 4 | Status: SHIPPED | OUTPATIENT
Start: 2024-07-15

## 2024-07-15 RX ORDER — TAMSULOSIN HYDROCHLORIDE 0.4 MG/1
CAPSULE ORAL
Qty: 90 CAPSULE | Refills: 3 | Status: SHIPPED | OUTPATIENT
Start: 2024-07-15

## 2024-08-19 ENCOUNTER — TELEPHONE (OUTPATIENT)
Dept: SURGERY | Facility: CLINIC | Age: 89
End: 2024-08-19
Payer: MEDICARE

## 2024-08-19 DIAGNOSIS — N40.1 BENIGN PROSTATIC HYPERPLASIA WITH URINARY FREQUENCY: Primary | ICD-10-CM

## 2024-08-19 DIAGNOSIS — R35.0 BENIGN PROSTATIC HYPERPLASIA WITH URINARY FREQUENCY: Primary | ICD-10-CM

## 2024-08-19 NOTE — TELEPHONE ENCOUNTER
Patient's daughter, Tanisha, is calling in regards to this patient's Gemtesa prescription. They are needing it refilled and sent to [facility]. He is on 75mg

## 2024-08-20 RX ORDER — PANTOPRAZOLE SODIUM 40 MG/1
40 TABLET, DELAYED RELEASE ORAL DAILY
Qty: 90 TABLET | Refills: 1 | Status: SHIPPED | OUTPATIENT
Start: 2024-08-20

## 2024-08-21 NOTE — TELEPHONE ENCOUNTER
PATIENT'S DAUGHTER, TORI, CALLED.  SHE SAID THAT [facility] IS TELLING HER THAT THEY DID NOT GET A PRESCRIPTION FOR THE GEMTESA.  THAT IS WHY SHE IS ASKING FOR A PAPER PRESCRIPTION.      SHE SAID PATIENT IS COMPLETELY OUT OF THE GEMTESA AND SHE WANTS TO KNOW IF SHE CAN COME AND GET SOME SAMPLES.    SHE SAID SHE WILL CALL BACK TOMORROW IF SHE DOESN'T HEAR BACK BEFORE 10:00.     #[phone]

## 2024-08-21 NOTE — TELEPHONE ENCOUNTER
Daughter called back to check on the gemtesa. She said that it has to be a physical prescription. Also he is out completely and wants to know if we have samples for him.

## 2024-08-27 ENCOUNTER — OFFICE VISIT (OUTPATIENT)
Dept: CARDIOLOGY | Facility: CLINIC | Age: 89
End: 2024-08-27
Payer: MEDICARE

## 2024-08-27 VITALS
WEIGHT: 190 LBS | OXYGEN SATURATION: 94 % | DIASTOLIC BLOOD PRESSURE: 80 MMHG | BODY MASS INDEX: 26.6 KG/M2 | HEART RATE: 62 BPM | SYSTOLIC BLOOD PRESSURE: 146 MMHG | HEIGHT: 71 IN

## 2024-08-27 DIAGNOSIS — J30.1 SEASONAL ALLERGIC RHINITIS DUE TO POLLEN: ICD-10-CM

## 2024-08-27 DIAGNOSIS — Z86.718 HISTORY OF DVT (DEEP VEIN THROMBOSIS): ICD-10-CM

## 2024-08-27 DIAGNOSIS — I10 ESSENTIAL HYPERTENSION: Primary | ICD-10-CM

## 2024-08-27 DIAGNOSIS — I50.32 CHF (CONGESTIVE HEART FAILURE), NYHA CLASS II, CHRONIC, DIASTOLIC: ICD-10-CM

## 2024-08-27 RX ORDER — CETIRIZINE HYDROCHLORIDE 5 MG/1
5 TABLET ORAL DAILY
Qty: 90 TABLET | Refills: 3 | Status: SHIPPED | OUTPATIENT
Start: 2024-08-27

## 2024-08-27 NOTE — PROGRESS NOTES
Chief Complaint  Hypertension    Subjective        History of Present Illness  Vladimir Carter presents to [facility] for follow up.   Patient is a 89-year-old male with past medical history outlined below, significant for hypertension, history of DVT, HFpEF who presents for routine follow-up.  He is doing well from a cardiac standpoint.  He has no complaints or concerns today.  He denies chest pain or discomfort.  He reports his breathing is stable.  He denies any palpitations, dizziness, lightheadedness or syncope.  He has lower extremity edema but reports he has not taken his water pill yet today.    Past Medical History:   Diagnosis Date    Abnormal bone density screening 09/29/2019    Spine-Osteopenia Hps-Osteoporosis    Benign non-nodular prostatic hyperplasia with lower urinary tract symptoms 10/29/2018    Bunion     CAD (coronary artery disease)     Callus     Cancer     Prostate Cancer    Chronic pain syndrome     CKD (chronic kidney disease) stage 3, GFR 30-59 ml/min     GERD without esophagitis 09/16/2016    H/O prostate cancer     Heart murmur     HTN (hypertension)     Hyperlipidemia     Ingrown toenail     Long term (current) use of anticoagulants     Lupus anticoagulant disorder     Moderate exercise     Active but no formal exercise    BENJA (obstructive sleep apnea)     Osteoarthritis of left knee 04/22/2016    end-stage    Osteoporosis     Osteoporosis in the hips/femur no fracture    Overweight (BMI 25.0-29.9)     Pedal edema 07/12/2021    Pulmonary embolism     Blood Clots in lung when in hospital    Pulmonary HTN     Severe tricuspid regurgitation 05/2018    ECHO (TTE) Diastolic dysfunction, Pulmonary HTN and Severe Tricuspid regurgitation    Tinea unguium 01/24/2019       ALLERGY  No Known Allergies     Past Surgical History:   Procedure Laterality Date    CHOLECYSTECTOMY      COLONOSCOPY      CYSTOSCOPY      HERNIA REPAIR      OTHER SURGICAL HISTORY       Brachytherapy    REPLACEMENT TOTAL KNEE Left 05/10/2017    REPLACEMENT TOTAL KNEE Right     WRIST SURGERY          Social History     Socioeconomic History    Marital status:    Tobacco Use    Smoking status: Former     Current packs/day: 0.00     Average packs/day: 2.0 packs/day for 60.0 years (120.0 ttl pk-yrs)     Types: Cigarettes, Cigars     Start date: [birth year]28/1949     Quit date: 8/28/2009     Years since quitting: 15.0     Passive exposure: Never    Smokeless tobacco: Never   Vaping Use    Vaping status: Never Used   Substance and Sexual Activity    Alcohol use: Never    Drug use: Never    Sexual activity: Not Currently     Partners: Female     Birth control/protection: None       Family History   Problem Relation Age of Onset    Heart disease Mother     Hypertension Mother     Breast cancer Daughter         Breast Neoplasm, Malignant    Cancer Daughter         Unspecified    Diabetes Daughter         Unspecified type        Current Outpatient Medications on File Prior to Visit   Medication Sig    ALBUTEROL IN As Needed.    alendronate (Fosamax) 70 MG tablet Take 1 tablet by mouth Every 7 (Seven) Days.    amLODIPine (NORVASC) 2.5 MG tablet Take 1 tablet by mouth Daily.    CBD (cannabidiol) oral oil Take  by mouth.    Cholecalciferol 50 MCG (2000 UT) capsule Vitamin D3 2,000 unit oral capsule take 1 capsule by oral route daily   Suspended    DHA-EPA-FLAXSEED OIL-VITAMIN E PO Take  by mouth.    docusate sodium (COLACE) 100 MG capsule Take 1 capsule by mouth 2 (Two) Times a Day. Indications: Constipation    Eliquis 2.5 MG tablet tablet Take 1 tablet by mouth 2 (Two) Times a Day.    empagliflozin (Jardiance) 10 MG tablet tablet Take 1 tablet by mouth Daily.    ipratropium (ATROVENT) 0.06 % nasal spray 2 sprays into the nostril(s) as directed by provider 4 (Four) Times a Day.    metoprolol succinate XL (TOPROL-XL) 50 MG 24 hr tablet Take 1 tablet by mouth Daily.    multivitamin with minerals tablet tablet  "multivitamin oral tablet take 1 tablet by oral route daily   Suspended    pantoprazole (PROTONIX) 40 MG EC tablet Take 1 tablet by mouth Daily.    Refresh Celluvisc 1 % gel eye gel     Restasis MultiDose 0.05 % ophthalmic emulsion Daily.    spironolactone (Aldactone) 50 MG tablet Take 1 tablet by mouth Daily. Take 1 tablet every day except on Sunday    tamsulosin (FLOMAX) 0.4 MG capsule 24 hr capsule TAKE 1 CAPSULE DAILY 1/2 HOUR FOLLOWING THE SAME MEAL EACH DAY    telmisartan (MICARDIS) 80 MG tablet Take 1 tablet by mouth Daily.    torsemide (Demadex) 20 MG tablet Take 1 tablet by mouth Daily.    Vibegron 75 MG tablet Take 1 tablet by mouth Daily.    [DISCONTINUED] cetirizine (zyrTEC) 10 MG tablet Take 1 tablet by mouth Daily. Indications: Hayfever    fluticasone (FLONASE) 50 MCG/ACT nasal spray into the nostril(s) as directed by provider. (Patient not taking: Reported on 8/27/2024)    tamsulosin (FLOMAX) 0.4 MG capsule 24 hr capsule Take 1 capsule by mouth Daily. (Patient not taking: Reported on 8/27/2024)     No current facility-administered medications on file prior to visit.       Objective   Vitals:    08/27/24 0915   BP: 146/80   Pulse: 62   SpO2: 94%   Weight: 86.2 kg (190 lb)   Height: 180.3 cm (71\")       Physical Exam  Constitutional:       General: He is awake. He is not in acute distress.     Appearance: Normal appearance.   HENT:      Head: Normocephalic.      Nose: Nose normal. No congestion.   Eyes:      Extraocular Movements: Extraocular movements intact.      Conjunctiva/sclera: Conjunctivae normal.      Pupils: Pupils are equal, round, and reactive to light.   Neck:      Thyroid: No thyromegaly.      Vascular: No JVD.   Cardiovascular:      Rate and Rhythm: Normal rate and regular rhythm.      Chest Wall: PMI is not displaced.      Pulses: Normal pulses.      Heart sounds: Normal heart sounds, S1 normal and S2 normal. No murmur heard.     No friction rub. No gallop. No S3 or S4 sounds. "   Pulmonary:      Effort: Pulmonary effort is normal.      Breath sounds: Normal breath sounds. No wheezing, rhonchi or rales.   Abdominal:      General: Bowel sounds are normal.      Palpations: Abdomen is soft.      Tenderness: There is no abdominal tenderness.   Musculoskeletal:      Cervical back: No tenderness.      Right lower leg: Edema present.      Left lower leg: Edema present.   Lymphadenopathy:      Cervical: No cervical adenopathy.   Skin:     General: Skin is warm and dry.      Capillary Refill: Capillary refill takes less than 2 seconds.      Coloration: Skin is not cyanotic.      Findings: No petechiae or rash.      Nails: There is no clubbing.   Neurological:      Mental Status: He is alert.   Psychiatric:         Mood and Affect: Mood normal.         Behavior: Behavior is cooperative.           Result Review     The following data was reviewed by FIORELLA Flynn on 08/27/24.      CMP          3/19/2024    08:38   CMP   Glucose 107    BUN 18    Creatinine 1.58    EGFR 39.8    Sodium 140    Potassium 4.5    Chloride 107    Calcium 9.0    Total Protein 6.2    Albumin 3.6    Globulin 2.6    Total Bilirubin 0.4    Alkaline Phosphatase 66    AST (SGOT) 15    ALT (SGPT) <5    Albumin/Globulin Ratio 1.4    BUN/Creatinine Ratio 11.4    Anion Gap 10.5      CBC w/diff          3/19/2024    08:38   CBC w/Diff   WBC 7.34    RBC 4.58    Hemoglobin 14.1    Hematocrit 42.5    MCV 92.8    MCH 30.8    MCHC 33.2    RDW 12.9    Platelets 207    Neutrophil Rel % 59.0    Immature Granulocyte Rel % 0.1    Lymphocyte Rel % 27.5    Monocyte Rel % 10.4    Eosinophil Rel % 2.3    Basophil Rel % 0.7       Lipid Panel          3/19/2024    08:38   Lipid Panel   Total Cholesterol 128    Triglycerides 67    HDL Cholesterol 39    VLDL Cholesterol 14    LDL Cholesterol  75    LDL/HDL Ratio 1.94        Results for orders placed in visit on 08/03/22    Adult Transthoracic Echo Complete W/ Cont if Necessary Per  Protocol    Interpretation Summary  · Estimated left ventricular EF was in agreement with the calculated left ventricular EF. Left ventricular ejection fraction appears to be 61 - 65%. Left ventricular systolic function is normal.  · Left ventricular diastolic function is consistent with (grade I) impaired relaxation.  · There is calcification of the aortic valve. There is mild aortic valve regurgitation noted but no significant stenosis  · Mild dilation of the aortic root is present.  · Estimated right ventricular systolic pressure from tricuspid regurgitation is mildly elevated (35-45 mmHg).  · Mild pulmonary hypertension is present.      No results found for this or any previous visit.          Procedures    Assessment & Plan  Diagnoses and all orders for this visit:    1. Essential hypertension (Primary)    2. Seasonal allergic rhinitis due to pollen  -     cetirizine (zyrTEC) 5 MG tablet; Take 1 tablet by mouth Daily. Indications: Hayfever  Dispense: 90 tablet; Refill: 3    3. CHF (congestive heart failure), NYHA class II, chronic, diastolic    4. History of DVT (deep vein thrombosis)      1.  Blood pressure is elevated in the office today but he has not taken his home blood pressure medications.  He presents with a home blood pressure log that demonstrates very good blood pressure control.  Continue current regimen.   2.  Zyrtec will be decreased to 5 mg daily due to age.  3.  Patient has bilateral lower extremity edema on exam but has not taken his water pill yet today.  He reports his breathing is stable.  He has no other symptoms of fluid overload.  Continue current medications and clinical monitoring.  4.  Continue Eliquis.  Patient notes no bleeding issues on this.          The medical services provided during this encounter are part of ongoing care related to this patient's single serious condition or complex condition.      Follow Up   Return in about 6 months (around 2/27/2025) for With   Becky.    Patient was given instructions and counseling regarding his condition or for health maintenance advice. Please see specific information pulled into the AVS if appropriate.     Janice Goyal, FIORELLA  08/27/24  10:07 EDT    Dictated Utilizing Dragon Dictation

## 2024-09-03 ENCOUNTER — LAB (OUTPATIENT)
Dept: LAB | Facility: HOSPITAL | Age: 89
End: 2024-09-03
Payer: MEDICARE

## 2024-09-03 ENCOUNTER — OFFICE VISIT (OUTPATIENT)
Dept: FAMILY MEDICINE CLINIC | Facility: CLINIC | Age: 89
End: 2024-09-03
Payer: MEDICARE

## 2024-09-03 VITALS
HEIGHT: 71 IN | SYSTOLIC BLOOD PRESSURE: 139 MMHG | WEIGHT: 194 LBS | HEART RATE: 60 BPM | DIASTOLIC BLOOD PRESSURE: 54 MMHG | TEMPERATURE: 96.8 F | OXYGEN SATURATION: 96 % | RESPIRATION RATE: 16 BRPM | BODY MASS INDEX: 27.16 KG/M2

## 2024-09-03 DIAGNOSIS — R73.03 PREDIABETES: ICD-10-CM

## 2024-09-03 DIAGNOSIS — M47.816 LUMBAR SPONDYLOSIS: Chronic | ICD-10-CM

## 2024-09-03 DIAGNOSIS — R29.898 WEAKNESS OF BOTH LOWER EXTREMITIES: ICD-10-CM

## 2024-09-03 DIAGNOSIS — I10 ESSENTIAL HYPERTENSION: Chronic | ICD-10-CM

## 2024-09-03 DIAGNOSIS — N40.0 BENIGN PROSTATIC HYPERPLASIA, UNSPECIFIED WHETHER LOWER URINARY TRACT SYMPTOMS PRESENT: Chronic | ICD-10-CM

## 2024-09-03 DIAGNOSIS — Z00.00 ENCOUNTER FOR SUBSEQUENT ANNUAL WELLNESS VISIT (AWV) IN MEDICARE PATIENT: Primary | ICD-10-CM

## 2024-09-03 DIAGNOSIS — N18.32 STAGE 3B CHRONIC KIDNEY DISEASE: Chronic | ICD-10-CM

## 2024-09-03 DIAGNOSIS — M81.0 AGE-RELATED OSTEOPOROSIS WITHOUT CURRENT PATHOLOGICAL FRACTURE: Chronic | ICD-10-CM

## 2024-09-03 DIAGNOSIS — I27.82 CHRONIC PULMONARY EMBOLISM, UNSPECIFIED PULMONARY EMBOLISM TYPE, UNSPECIFIED WHETHER ACUTE COR PULMONALE PRESENT: ICD-10-CM

## 2024-09-03 LAB
ALBUMIN SERPL-MCNC: 3.6 G/DL (ref 3.5–5.2)
ALBUMIN/GLOB SERPL: 1.3 G/DL
ALP SERPL-CCNC: 60 U/L (ref 39–117)
ALT SERPL W P-5'-P-CCNC: 5 U/L (ref 1–41)
ANION GAP SERPL CALCULATED.3IONS-SCNC: 11.2 MMOL/L (ref 5–15)
AST SERPL-CCNC: 17 U/L (ref 1–40)
BILIRUB SERPL-MCNC: 0.3 MG/DL (ref 0–1.2)
BUN SERPL-MCNC: 24 MG/DL (ref 8–23)
BUN/CREAT SERPL: 15.2 (ref 7–25)
CALCIUM SPEC-SCNC: 9.2 MG/DL (ref 8.2–9.6)
CHLORIDE SERPL-SCNC: 104 MMOL/L (ref 98–107)
CHOLEST SERPL-MCNC: 137 MG/DL (ref 0–200)
CO2 SERPL-SCNC: 23.8 MMOL/L (ref 22–29)
CREAT SERPL-MCNC: 1.58 MG/DL (ref 0.76–1.27)
DEPRECATED RDW RBC AUTO: 44.4 FL (ref 37–54)
EGFRCR SERPLBLD CKD-EPI 2021: 39.5 ML/MIN/1.73
ERYTHROCYTE [DISTWIDTH] IN BLOOD BY AUTOMATED COUNT: 12.7 % (ref 12.3–15.4)
GLOBULIN UR ELPH-MCNC: 2.7 GM/DL
GLUCOSE SERPL-MCNC: 99 MG/DL (ref 65–99)
HBA1C MFR BLD: 5.9 % (ref 4.8–5.6)
HCT VFR BLD AUTO: 44 % (ref 37.5–51)
HDLC SERPL-MCNC: 38 MG/DL (ref 40–60)
HGB BLD-MCNC: 14.5 G/DL (ref 13–17.7)
LDLC SERPL CALC-MCNC: 85 MG/DL (ref 0–100)
LDLC/HDLC SERPL: 2.23 {RATIO}
MCH RBC QN AUTO: 31.7 PG (ref 26.6–33)
MCHC RBC AUTO-ENTMCNC: 33 G/DL (ref 31.5–35.7)
MCV RBC AUTO: 96.3 FL (ref 79–97)
PLATELET # BLD AUTO: 175 10*3/MM3 (ref 140–450)
PMV BLD AUTO: 10.5 FL (ref 6–12)
POTASSIUM SERPL-SCNC: 4.5 MMOL/L (ref 3.5–5.2)
PROT SERPL-MCNC: 6.3 G/DL (ref 6–8.5)
RBC # BLD AUTO: 4.57 10*6/MM3 (ref 4.14–5.8)
SODIUM SERPL-SCNC: 139 MMOL/L (ref 136–145)
T4 FREE SERPL-MCNC: 1.21 NG/DL (ref 0.92–1.68)
TRIGL SERPL-MCNC: 71 MG/DL (ref 0–150)
TSH SERPL DL<=0.05 MIU/L-ACNC: 3.35 UIU/ML (ref 0.27–4.2)
VLDLC SERPL-MCNC: 14 MG/DL (ref 5–40)
WBC NRBC COR # BLD AUTO: 7.89 10*3/MM3 (ref 3.4–10.8)

## 2024-09-03 PROCEDURE — 83036 HEMOGLOBIN GLYCOSYLATED A1C: CPT

## 2024-09-03 PROCEDURE — 1126F AMNT PAIN NOTED NONE PRSNT: CPT | Performed by: FAMILY MEDICINE

## 2024-09-03 PROCEDURE — 84439 ASSAY OF FREE THYROXINE: CPT

## 2024-09-03 PROCEDURE — 36415 COLL VENOUS BLD VENIPUNCTURE: CPT

## 2024-09-03 PROCEDURE — 1159F MED LIST DOCD IN RCRD: CPT | Performed by: FAMILY MEDICINE

## 2024-09-03 PROCEDURE — 1170F FXNL STATUS ASSESSED: CPT | Performed by: FAMILY MEDICINE

## 2024-09-03 PROCEDURE — G0439 PPPS, SUBSEQ VISIT: HCPCS | Performed by: FAMILY MEDICINE

## 2024-09-03 PROCEDURE — 84443 ASSAY THYROID STIM HORMONE: CPT

## 2024-09-03 PROCEDURE — 1160F RVW MEDS BY RX/DR IN RCRD: CPT | Performed by: FAMILY MEDICINE

## 2024-09-03 PROCEDURE — 80053 COMPREHEN METABOLIC PANEL: CPT

## 2024-09-03 PROCEDURE — 99214 OFFICE O/P EST MOD 30 MIN: CPT | Performed by: FAMILY MEDICINE

## 2024-09-03 PROCEDURE — 80061 LIPID PANEL: CPT

## 2024-09-03 PROCEDURE — 85027 COMPLETE CBC AUTOMATED: CPT

## 2024-09-03 NOTE — PROGRESS NOTES
Subjective   The ABCs of the Annual Wellness Visit  Medicare Wellness Visit      Vladimir Carter is a 89 y.o. patient who presents for a Medicare Wellness Visit.    The following portions of the patient's history were reviewed and   updated as appropriate: allergies, current medications, past family history, past medical history, past social history, past surgical history, and problem list.    Compared to one year ago, the patient's physical   health is the same.  Compared to one year ago, the patient's mental   health is the same.    Recent Hospitalizations:  He was not admitted to the hospital during the last year.     Current Medical Providers:  Patient Care Team:  Mango Kumar DO as PCP - General (Family Medicine)  Paxton Lius MD as Consulting Physician (Cardiology)  Verna Bautista APRN as Nurse Practitioner (Urology)    Outpatient Medications Prior to Visit   Medication Sig Dispense Refill    alendronate (Fosamax) 70 MG tablet Take 1 tablet by mouth Every 7 (Seven) Days. 52 tablet 1    amLODIPine (NORVASC) 2.5 MG tablet Take 1 tablet by mouth Daily. 90 tablet 3    CBD (cannabidiol) oral oil Take  by mouth.      cetirizine (zyrTEC) 5 MG tablet Take 1 tablet by mouth Daily. Indications: Hayfever 90 tablet 3    Cholecalciferol 50 MCG (2000 UT) capsule Vitamin D3 2,000 unit oral capsule take 1 capsule by oral route daily   Suspended      DHA-EPA-FLAXSEED OIL-VITAMIN E PO Take  by mouth.      docusate sodium (COLACE) 100 MG capsule Take 1 capsule by mouth 2 (Two) Times a Day. Indications: Constipation 180 capsule 3    Eliquis 2.5 MG tablet tablet Take 1 tablet by mouth 2 (Two) Times a Day. 180 tablet 3    empagliflozin (Jardiance) 10 MG tablet tablet Take 1 tablet by mouth Daily. 90 tablet 3    fluticasone (FLONASE) 50 MCG/ACT nasal spray into the nostril(s) as directed by provider.      ipratropium (ATROVENT) 0.06 % nasal spray 2 sprays into the nostril(s) as directed by provider 4 (Four) Times a Day.  15 mL 5    metoprolol succinate XL (TOPROL-XL) 50 MG 24 hr tablet Take 1 tablet by mouth Daily. 90 tablet 3    multivitamin with minerals tablet tablet multivitamin oral tablet take 1 tablet by oral route daily   Suspended      pantoprazole (PROTONIX) 40 MG EC tablet Take 1 tablet by mouth Daily. 90 tablet 1    Refresh Celluvisc 1 % gel eye gel       Restasis MultiDose 0.05 % ophthalmic emulsion Daily.      spironolactone (Aldactone) 50 MG tablet Take 1 tablet by mouth Daily. Take 1 tablet every day except on Sunday 90 tablet 3    tamsulosin (FLOMAX) 0.4 MG capsule 24 hr capsule TAKE 1 CAPSULE DAILY 1/2 HOUR FOLLOWING THE SAME MEAL EACH DAY 90 capsule 3    telmisartan (MICARDIS) 80 MG tablet Take 1 tablet by mouth Daily. 90 tablet 3    torsemide (Demadex) 20 MG tablet Take 1 tablet by mouth Daily. 90 tablet 3    Vibegron 75 MG tablet Take 1 tablet by mouth Daily. 28 tablet 0    ALBUTEROL IN As Needed.       No facility-administered medications prior to visit.     No opioid medication identified on active medication list. I have reviewed chart for other potential  high risk medication/s and harmful drug interactions in the elderly.      Aspirin is not on active medication list.  Aspirin use is not indicated based on review of current medical condition/s. Risk of harm outweighs potential benefits.  .    Patient Active Problem List   Diagnosis    Stage 3b chronic kidney disease    Essential hypertension    Hyperlipidemia LDL goal <100    History of DVT (deep vein thrombosis)    Pedal edema    Bradycardia, sinus    Osteoporosis    Chronic pain disorder    Benign prostatic hyperplasia    Esophageal reflux    History of prostate cancer    Lumbar spondylosis    BENJA (obstructive sleep apnea)    Overweight (BMI 25.0-29.9)    Spinal stenosis of lumbar region    Tricuspid valve insufficiency    Essential tremor    Benign prostatic hyperplasia with urinary frequency    Senile osteoporosis    CHF (congestive heart failure), NYHA  "class II, chronic, diastolic    Urge incontinence    Weakness of both lower extremities     Advance Care Planning Advance Directive is on file.  ACP discussion was declined by the patient. Patient has an advance directive in EMR which is still valid.             Objective   Vitals:    24 0805   BP: 139/54   BP Location: Right arm   Patient Position: Sitting   Cuff Size: Adult   Pulse: 60   Resp: 16   Temp: 96.8 °F (36 °C)   SpO2: 96%   Weight: 88 kg (194 lb)   Height: 180.3 cm (71\")       Estimated body mass index is 27.06 kg/m² as calculated from the following:    Height as of this encounter: 180.3 cm (71\").    Weight as of this encounter: 88 kg (194 lb).            Does the patient have evidence of cognitive impairment?  reviewed                                                                                                Health  Risk Assessment    Smoking Status:  Social History     Tobacco Use   Smoking Status Former    Current packs/day: 0.00    Average packs/day: 2.0 packs/day for 60.0 years (120.0 ttl pk-yrs)    Types: Cigarettes, Cigars    Start date: 1949    Quit date: 2009    Years since quitting: 15.0    Passive exposure: Never   Smokeless Tobacco Never     Alcohol Consumption:  Social History     Substance and Sexual Activity   Alcohol Use Never       Fall Risk Screen  STEADI Fall Risk Assessment was completed, and patient is at LOW risk for falls.Assessment completed on:9/3/2024    Depression Screenin/3/2024     8:05 AM   PHQ-2/PHQ-9 Depression Screening   Little Interest or Pleasure in Doing Things 0-->not at all   Feeling Down, Depressed or Hopeless 0-->not at all   PHQ-9: Brief Depression Severity Measure Score 0     Health Habits and Functional and Cognitive Screenin/3/2024     8:02 AM   Functional & Cognitive Status   Do you have difficulty preparing food and eating? No   Do you have difficulty bathing yourself, getting dressed or grooming yourself? No   Do you " have difficulty using the toilet? No   Do you have difficulty moving around from place to place? No   Do you have trouble with steps or getting out of a bed or a chair? Yes   Current Diet Well Balanced Diet   Dental Exam Up to date   Eye Exam Up to date   Exercise (times per week) 5 times per week   Current Exercises Include Yard Work   Do you need help using the phone?  No   Are you deaf or do you have serious difficulty hearing?  Yes   Do you need help to go to places out of walking distance? Yes   Do you need help shopping? Yes   Do you need help preparing meals?  No   Do you need help with housework?  Yes   Do you need help with laundry? Yes   Do you need help taking your medications? Yes   Do you need help managing money? Yes   Do you ever drive or ride in a car without wearing a seat belt? No   Have you felt unusual stress, anger or loneliness in the last month? No   Who do you live with? Child   If you need help, do you have trouble finding someone available to you? No   Have you been bothered in the last four weeks by sexual problems? No   Do you have difficulty concentrating, remembering or making decisions? No           Age-appropriate Screening Schedule:  Refer to the list below for future screening recommendations based on patient's age, sex and/or medical conditions. Orders for these recommended tests are listed in the plan section. The patient has been provided with a written plan.    Health Maintenance List  Health Maintenance   Topic Date Due    TDAP/TD VACCINES (1 - Tdap) Never done    ZOSTER VACCINE (1 of 2) Never done    RSV Vaccine - Adults (1 - 1-dose 60+ series) Never done    Pneumococcal Vaccine 65+ (2 of 2 - PPSV23 or PCV20) 12/02/2019    INFLUENZA VACCINE  08/01/2024    COVID-19 Vaccine (7 - 2023-24 season) 11/23/2024 (Originally 9/1/2024)    LIPID PANEL  03/19/2025    DXA SCAN  07/07/2025    BMI FOLLOWUP  08/27/2025    ANNUAL WELLNESS VISIT  09/03/2025                                         "                                                                                                        CMS Preventative Services Quick Reference  Risk Factors Identified During Encounter  reviewed    The above risks/problems have been discussed with the patient.  Pertinent information has been shared with the patient in the After Visit Summary.  An After Visit Summary and PPPS were made available to the patient.    Follow Up:   Next Medicare Wellness visit to be scheduled in 1 year.         Additional E&M Note during same encounter follows:  Patient has additional, significant, and separately identifiable condition(s)/problem(s) that require work above and beyond the Medicare Wellness Visit     Chief Complaint  Medicare Wellness-subsequent (No concerns at this time. ), Back Pain, and Knee Pain    Subjective   HPI  Vladimir is also being seen today for additional medical problem/s.        Patient last had labs on 3/2024 overall are stable unremarkable A1c 6.1 with history of prediabetes, kidney function creatinine was 1.58 with CKD stage IIIb stable with GFR 39.8.  Blood pressure good at goal less than 140/90 taking medicine as prescribed no falls or changes in patient's mental status or other overall doing well        Objective   Vital Signs:  /54 (BP Location: Right arm, Patient Position: Sitting, Cuff Size: Adult)   Pulse 60   Temp 96.8 °F (36 °C)   Resp 16   Ht 180.3 cm (71\")   Wt 88 kg (194 lb)   SpO2 96%   BMI 27.06 kg/m²   Physical Exam  Vitals reviewed.   Constitutional:       Appearance: Normal appearance. He is well-developed.   HENT:      Head: Normocephalic and atraumatic.      Right Ear: External ear normal.      Left Ear: External ear normal.      Nose: Nose normal.   Eyes:      Conjunctiva/sclera: Conjunctivae normal.      Pupils: Pupils are equal, round, and reactive to light.   Cardiovascular:      Rate and Rhythm: Normal rate.   Pulmonary:      Effort: Pulmonary effort is normal.      " Breath sounds: Normal breath sounds.   Abdominal:      General: There is no distension.   Skin:     General: Skin is warm and dry.   Neurological:      Mental Status: He is alert and oriented to person, place, and time. Mental status is at baseline.      Motor: Weakness present.   Psychiatric:         Mood and Affect: Mood and affect normal.         Behavior: Behavior normal.         Thought Content: Thought content normal.         Judgment: Judgment normal.         The following data was reviewed by: Mango Kumar DO on 09/03/2024:        Assessment and Plan Additional age appropriate preventative wellness advice topics were discussed during today's preventative wellness exam(some topics already addressed during AWV portion of the note above):    Physical Activity: Advised cardiovascular activity 150 minutes per week as tolerated. (example brisk walk for 30 minutes, 5 days a week).   Nutrition: Discussed nutrition plan with patient. Information shared in after visit summary. Goal is for a well balanced diet to enhance overall health.              Chronic pulmonary embolism, unspecified pulmonary embolism type, unspecified whether acute cor pulmonale present    Stage 3b chronic kidney disease    Encounter for subsequent annual wellness visit (AWV) in Medicare patient    Essential hypertension    Weakness of both lower extremities    Age-related osteoporosis without current pathological fracture    Lumbar spondylosis    Benign prostatic hyperplasia, unspecified whether lower urinary tract symptoms present    Prediabetes      Orders Placed This Encounter   Procedures    CBC (No Diff)     Standing Status:   Future     Order Specific Question:   Release to patient     Answer:   Routine Release [[id number]]    TSH+Free T4     Standing Status:   Future     Order Specific Question:   Release to patient     Answer:   Routine Release [[id number]]    Comprehensive Metabolic Panel     Standing Status:   Future     Standing  Expiration Date:   9/3/2025     Order Specific Question:   Release to patient     Answer:   Routine Release [[id number]]    Lipid Panel     Standing Status:   Future     Standing Expiration Date:   9/3/2025     Order Specific Question:   Release to patient     Answer:   Routine Release [[id number]]    Hemoglobin A1c     Standing Status:   Future     Order Specific Question:   Release to patient     Answer:   Routine Release [[id number]]       Reviewed AWV recommendations and ordered as appropriate, follow up annually for review, sooner if concerns    No depression, falls, dementia symptoms or other  Risk and home safety assessment good    Followed up on chronic conditions and ordered refills, appropriate monitoring labs additionally as needed every 6 months or sooner if indicated, controlled stable    Follow up at least every 3-6 months for chronic conditions and as scheduled with appropriate specialists as indicated otherwise              Follow Up   No follow-ups on file.  Patient was given instructions and counseling regarding his condition or for health maintenance advice. Please see specific information pulled into the AVS if appropriate.

## 2024-09-05 ENCOUNTER — PATIENT ROUNDING (BHMG ONLY) (OUTPATIENT)
Dept: FAMILY MEDICINE CLINIC | Facility: CLINIC | Age: 89
End: 2024-09-05
Payer: MEDICARE

## 2024-10-09 ENCOUNTER — OFFICE VISIT (OUTPATIENT)
Dept: PODIATRY | Facility: CLINIC | Age: 89
End: 2024-10-09
Payer: MEDICARE

## 2024-10-09 VITALS
TEMPERATURE: 97.5 F | BODY MASS INDEX: 27.16 KG/M2 | OXYGEN SATURATION: 93 % | HEART RATE: 59 BPM | SYSTOLIC BLOOD PRESSURE: 156 MMHG | DIASTOLIC BLOOD PRESSURE: 77 MMHG | WEIGHT: 194 LBS | HEIGHT: 71 IN

## 2024-10-09 DIAGNOSIS — L60.0 ONYCHOCRYPTOSIS: Primary | ICD-10-CM

## 2024-10-09 DIAGNOSIS — R26.2 DIFFICULTY WALKING: ICD-10-CM

## 2024-10-09 DIAGNOSIS — M79.671 FOOT PAIN, BILATERAL: ICD-10-CM

## 2024-10-09 DIAGNOSIS — M79.672 FOOT PAIN, BILATERAL: ICD-10-CM

## 2024-10-09 DIAGNOSIS — B35.1 ONYCHOMYCOSIS: ICD-10-CM

## 2024-10-09 NOTE — PROGRESS NOTES
[facility] - PODIATRY    Today's Date: 10/09/24    Patient Name: Vladimir Carter  MRN: [id number]  CSN: [id number]  PCP: Mango Kumar DO, Last PCP Visit: 9/3/2024  Referring Provider: No ref. provider found    SUBJECTIVE     Chief Complaint   Patient presents with    Left Foot - Follow-up, Nail Problem    Right Foot - Follow-up, Nail Problem     HPI: Vladimir Carter, a 89 y.o.male, comes to clinic.    New, Established, New Problem:  established   Location:  Toenails  Duration:   Greater than five years  Onset:  Gradual  Nature:  sore with palpation.  Stable, worsening, improving:   Stable  Aggravating factors:  Pain with shoe gear and ambulation.  Previous Treatment:  debridement    Medical changes:  none    Patient denies any fevers, chills, nausea, vomiting, shortness of breathe, nor any other constitutional signs nor symptoms.       I have reviewed/confirmed previously documented HPI with no changes.       Past Medical History:   Diagnosis Date    Abnormal bone density screening 09/29/2019    Spine-Osteopenia Hps-Osteoporosis    Benign non-nodular prostatic hyperplasia with lower urinary tract symptoms 10/29/2018    Bunion     CAD (coronary artery disease)     Callus     Cancer     Prostate Cancer    Chronic pain syndrome     CKD (chronic kidney disease) stage 3, GFR 30-59 ml/min     GERD without esophagitis 09/16/2016    H/O prostate cancer     Heart murmur     HTN (hypertension)     Hyperlipidemia     Ingrown toenail     Long term (current) use of anticoagulants     Lupus anticoagulant disorder     Moderate exercise     Active but no formal exercise    BENJA (obstructive sleep apnea)     Osteoarthritis of left knee 04/22/2016    end-stage    Osteoporosis     Osteoporosis in the hips/femur no fracture    Overweight (BMI 25.0-29.9)     Pedal edema 07/12/2021    Pulmonary embolism     Blood Clots in lung when in hospital    Pulmonary HTN     Severe tricuspid regurgitation 05/2018    ECHO (TTE)  Diastolic dysfunction, Pulmonary HTN and Severe Tricuspid regurgitation    Tinea unguium 01/24/2019     Past Surgical History:   Procedure Laterality Date    CHOLECYSTECTOMY      COLONOSCOPY      CYSTOSCOPY      HERNIA REPAIR      OTHER SURGICAL HISTORY      Brachytherapy    REPLACEMENT TOTAL KNEE Left 05/10/2017    REPLACEMENT TOTAL KNEE Right     WRIST SURGERY       Family History   Problem Relation Age of Onset    Heart disease Mother     Hypertension Mother     Breast cancer Daughter         Breast Neoplasm, Malignant    Cancer Daughter         Unspecified    Diabetes Daughter         Unspecified type    Cancer Daughter         Right Breast     Social History     Socioeconomic History    Marital status:    Tobacco Use    Smoking status: Former     Current packs/day: 0.00     Average packs/day: 2.0 packs/day for 60.0 years (120.0 ttl pk-yrs)     Types: Cigarettes, Cigars     Start date: [birth year]28/1949     Quit date: 8/28/2009     Years since quitting: 15.1     Passive exposure: Never    Smokeless tobacco: Never   Vaping Use    Vaping status: Never Used   Substance and Sexual Activity    Alcohol use: Never    Drug use: Never    Sexual activity: Not Currently     Partners: Female     Birth control/protection: None     No Known Allergies  Current Outpatient Medications   Medication Sig Dispense Refill    ALBUTEROL IN As Needed.      alendronate (Fosamax) 70 MG tablet Take 1 tablet by mouth Every 7 (Seven) Days. 52 tablet 1    amLODIPine (NORVASC) 2.5 MG tablet Take 1 tablet by mouth Daily. 90 tablet 3    CBD (cannabidiol) oral oil Take  by mouth.      cetirizine (zyrTEC) 5 MG tablet Take 1 tablet by mouth Daily. Indications: Hayfever 90 tablet 3    Cholecalciferol 50 MCG (2000 UT) capsule Vitamin D3 2,000 unit oral capsule take 1 capsule by oral route daily   Suspended      DHA-EPA-FLAXSEED OIL-VITAMIN E PO Take  by mouth.      docusate sodium (COLACE) 100 MG capsule Take 1 capsule by mouth 2 (Two) Times a Day.  Indications: Constipation 180 capsule 3    Eliquis 2.5 MG tablet tablet Take 1 tablet by mouth 2 (Two) Times a Day. 180 tablet 3    empagliflozin (Jardiance) 10 MG tablet tablet Take 1 tablet by mouth Daily. 90 tablet 3    fluticasone (FLONASE) 50 MCG/ACT nasal spray into the nostril(s) as directed by provider.      ipratropium (ATROVENT) 0.06 % nasal spray 2 sprays into the nostril(s) as directed by provider 4 (Four) Times a Day. 15 mL 5    metoprolol succinate XL (TOPROL-XL) 50 MG 24 hr tablet Take 1 tablet by mouth Daily. 90 tablet 3    multivitamin with minerals tablet tablet multivitamin oral tablet take 1 tablet by oral route daily   Suspended      pantoprazole (PROTONIX) 40 MG EC tablet Take 1 tablet by mouth Daily. 90 tablet 1    Refresh Celluvisc 1 % gel eye gel       Restasis MultiDose 0.05 % ophthalmic emulsion Daily.      spironolactone (Aldactone) 50 MG tablet Take 1 tablet by mouth Daily. Take 1 tablet every day except on Sunday 90 tablet 3    tamsulosin (FLOMAX) 0.4 MG capsule 24 hr capsule TAKE 1 CAPSULE DAILY 1/2 HOUR FOLLOWING THE SAME MEAL EACH DAY 90 capsule 3    telmisartan (MICARDIS) 80 MG tablet Take 1 tablet by mouth Daily. 90 tablet 3    torsemide (Demadex) 20 MG tablet Take 1 tablet by mouth Daily. 90 tablet 3    Vibegron 75 MG tablet Take 1 tablet by mouth Daily. 28 tablet 0     No current facility-administered medications for this visit.     Review of Systems   Constitutional: Negative.    Skin:         Painful toenails.   All other systems reviewed and are negative.      OBJECTIVE     Vitals:    10/09/24 0733   BP: 156/77   Pulse: 59   Temp: 97.5 °F (36.4 °C)   SpO2: 93%         Patient seen in no apparent distress.      PHYSICAL EXAM:     Foot/Ankle Exam    GENERAL  Appearance:  elderly  Orientation:  AAOx3  Affect:  appropriate  Gait:  antalgic  Assistance:  wheelchair  Right shoe gear: casual shoe  Left shoe gear: casual shoe    VASCULAR     Right Foot Vascularity   Dorsalis pedis:   1+  Posterior tibial:  1+  Skin temperature:  cool  Edema gradin+ and pitting  CFT:  < 3 seconds  Pedal hair growth:  Present  Varicosities:  mild varicosities     Left Foot Vascularity   Dorsalis pedis:  2+  Posterior tibial:  1+  Skin temperature:  cool  Edema gradin+ and pitting  CFT:  < 3 seconds  Pedal hair growth:  Present  Varicosities:  mild varicosities     NEUROLOGIC     Right Foot Neurologic   Normal sensation    Light touch sensation: normal  Vibratory sensation: normal  Hot/Cold sensation: normal  Protective Sensation using [place] Monofilament:   Sites intact: 10     Left Foot Neurologic   Normal sensation    Light touch sensation: normal  Vibratory sensation: normal  Hot/Cold sensation:  normal  Protective Sensation using [place] Monofilament:   Sites intact: 10  Sites tested: 10    MUSCLE STRENGTH     Right Foot Muscle Strength   Foot dorsiflexion:  4-  Foot plantar flexion:  4-  Foot inversion:  4-  Foot eversion:  4-     Left Foot Muscle Strength   Foot dorsiflexion:  4-  Foot plantar flexion:  4-  Foot inversion:  4-  Foot eversion:  4-    RANGE OF MOTION     Right Foot Range of Motion   Foot and ankle ROM within normal limits       Left Foot Range of Motion   Foot and ankle ROM within normal limits      DERMATOLOGIC      Right Foot Dermatologic   Skin  Right foot skin is intact.   Nails  1.  Positive for elongated, onychomycosis, abnormal thickness, subungual debris and ingrown toenail.  2.  Positive for elongated, onychomycosis, abnormal thickness, subungual debris and ingrown toenail.  3.  Positive for elongated, onychomycosis, abnormal thickness, subungual debris and ingrown toenail.  4.  Positive for elongated, onychomycosis, abnormal thickness, subungual debris and ingrown toenail.  5.  Positive for elongated, onychomycosis, abnormal thickness, subungual debris and ingrown toenail.  Nails comment:  Toenails 1, 2, 3, 4, and 5     Left Foot Dermatologic   Skin  Left  foot skin is intact.   Nails comment:  Toenails 1, 2, 3, 4, and 5  Nails  1.  Positive for elongated, onychomycosis, abnormal thickness, subungual debris and ingrown toenail.  2.  Positive for elongated, onychomycosis, abnormal thickness, subungual debris and ingrown toenail.  3.  Positive for elongated, onychomycosis, abnormal thickness, subungual debris and ingrown toenail.  4.  Positive for elongated, onychomycosis, abnormally thick, subungual debris and ingrown toenail.  5.  Positive for elongated, onychomycosis, abnormally thick, subungual debris and ingrown toenail.    I have reexamined the patient the results are consistent with the previously documented exam.    ASSESSMENT/PLAN     Diagnoses and all orders for this visit:    1. Onychocryptosis (Primary)    2. Onychomycosis    3. Difficulty walking    4. Foot pain, bilateral    Comprehensive lower extremity examination and evaluation was performed.    Discussed findings and treatment plan including risks, benefits, and treatment options with patient in detail. Patient agreed with treatment plan.    Toenails 1 through 5 bilaterally were debrided in thickness and length and then smoothed with a Dremel Tool.  Tolerated the procedure well without complications.    An After Visit Summary was printed and given to the patient at discharge, including (if requested) any available informative/educational handouts regarding diagnosis, treatment, or medications. All questions were answered to patient/family satisfaction. Should symptoms fail to improve or worsen they agree to call or return to clinic or to go to the Emergency Department. Discussed the importance of following up with any needed screening tests/labs/specialist appointments and any requested follow-up recommended by me today. Importance of maintaining follow-up discussed and patient accepts that missed appointments can delay diagnosis and potentially lead to worsening of conditions.    Return in about 9  weeks (around 12/11/2024) for Toenail Care., or sooner if acute issues arise.    I have reviewed the assessment and plan and verified the accuracy of it. No changes to assessment and plan since the information was documented. Enzo Ferrell DPM 10/09/24     I have dictated this note utilizing Dragon Dictation.  Please note that portions of this note were completed with a voice recognition program.  Part of this note may be an electronic transcription/translation of spoken language to printed text using the Dragon Dictation System.      This document has been electronically signed by Enzo Ferrell DPM on October 9, 2024 08:05 EDT

## 2024-10-16 ENCOUNTER — TELEPHONE (OUTPATIENT)
Dept: FAMILY MEDICINE CLINIC | Facility: CLINIC | Age: 89
End: 2024-10-16
Payer: MEDICARE

## 2024-10-16 NOTE — TELEPHONE ENCOUNTER
Caller: TORI OLIVER    Relationship: Emergency Contact    Best call back number: [phone]    What was the call regarding: TORI STATES TO PLEASE CALL HER WHEN THE PATIENTS DISABILITY PARKING STICKER PAPER WORK IS READY FOR .

## 2024-10-16 NOTE — TELEPHONE ENCOUNTER
Its just a paper we print off and I sign and they take to clerks office  I thought I gave them one last time or signed either way Ill have on my desk for them to come get

## 2024-10-17 ENCOUNTER — FLU SHOT (OUTPATIENT)
Dept: FAMILY MEDICINE CLINIC | Facility: CLINIC | Age: 89
End: 2024-10-17
Payer: MEDICARE

## 2024-10-17 DIAGNOSIS — Z23 NEED FOR INFLUENZA VACCINATION: Primary | ICD-10-CM

## 2024-10-17 PROCEDURE — 90662 IIV NO PRSV INCREASED AG IM: CPT | Performed by: FAMILY MEDICINE

## 2024-10-17 PROCEDURE — G0008 ADMIN INFLUENZA VIRUS VAC: HCPCS | Performed by: FAMILY MEDICINE

## 2024-10-28 DIAGNOSIS — M81.0 AGE-RELATED OSTEOPOROSIS WITHOUT CURRENT PATHOLOGICAL FRACTURE: ICD-10-CM

## 2024-10-28 RX ORDER — ALENDRONATE SODIUM 70 MG/1
70 TABLET ORAL
Qty: 52 TABLET | Refills: 1 | Status: SHIPPED | OUTPATIENT
Start: 2024-10-28

## 2025-02-12 ENCOUNTER — LAB (OUTPATIENT)
Dept: LAB | Facility: HOSPITAL | Age: OVER 89
End: 2025-02-12
Payer: MEDICARE

## 2025-02-12 DIAGNOSIS — Z12.5 PROSTATE CANCER SCREENING: ICD-10-CM

## 2025-02-12 LAB — PSA SERPL-MCNC: <0.014 NG/ML (ref 0–4)

## 2025-02-12 PROCEDURE — 36415 COLL VENOUS BLD VENIPUNCTURE: CPT

## 2025-02-12 PROCEDURE — G0103 PSA SCREENING: HCPCS

## 2025-02-18 ENCOUNTER — OFFICE VISIT (OUTPATIENT)
Dept: UROLOGY | Age: OVER 89
End: 2025-02-18
Payer: MEDICARE

## 2025-02-18 VITALS — HEIGHT: 71 IN | WEIGHT: 194 LBS | BODY MASS INDEX: 27.16 KG/M2

## 2025-02-18 DIAGNOSIS — Z12.5 PROSTATE CANCER SCREENING: ICD-10-CM

## 2025-02-18 DIAGNOSIS — R35.0 BENIGN PROSTATIC HYPERPLASIA WITH URINARY FREQUENCY: ICD-10-CM

## 2025-02-18 DIAGNOSIS — N40.1 BENIGN PROSTATIC HYPERPLASIA WITH URINARY FREQUENCY: ICD-10-CM

## 2025-02-18 DIAGNOSIS — Z85.46 HISTORY OF PROSTATE CANCER: ICD-10-CM

## 2025-02-18 DIAGNOSIS — N40.0 BENIGN PROSTATIC HYPERPLASIA, UNSPECIFIED WHETHER LOWER URINARY TRACT SYMPTOMS PRESENT: Primary | ICD-10-CM

## 2025-02-18 LAB
BILIRUB BLD-MCNC: NEGATIVE MG/DL
CLARITY, POC: CLEAR
COLOR UR: YELLOW
EXPIRATION DATE: ABNORMAL
GLUCOSE UR STRIP-MCNC: ABNORMAL MG/DL
KETONES UR QL: NEGATIVE
LEUKOCYTE EST, POC: NEGATIVE
Lab: ABNORMAL
NITRITE UR-MCNC: NEGATIVE MG/ML
PH UR: 5.5 [PH] (ref 5–8)
PROT UR STRIP-MCNC: NEGATIVE MG/DL
RBC # UR STRIP: NEGATIVE /UL
SP GR UR: 1.01 (ref 1–1.03)
URINE VOLUME: NORMAL
UROBILINOGEN UR QL: ABNORMAL

## 2025-02-18 RX ORDER — TAMSULOSIN HYDROCHLORIDE 0.4 MG/1
1 CAPSULE ORAL DAILY
Qty: 90 CAPSULE | Refills: 4 | Status: SHIPPED | OUTPATIENT
Start: 2025-02-18

## 2025-02-18 NOTE — PROGRESS NOTES
Chief Complaint: Benign Prostatic Hypertrophy (He denies any urinary issues at this moment. He states that the flomax and gemtesa are still helping )    Subjective         Benign Prostatic Hypertrophy      Vladimir Carter is a 89 y.o. male presents to [facility] to be seen for annual f/u urinary leakage.    He remains on gemtesa and tamsulosin.    Nocturia x 1     Stream is good.     Denies hesitancy or straining.    Some urgency at times.     Still having leakage.     PSA from 2/13/25 remains undetectable.    Gfr and creatinine remain stable.        Previous:     Since we last saw the patient he underwent lower urinary tract evaluation with Dr. Tang on 7/11/2023.    Mild penile urethral stricture down to about 5 mm was noted very thin and soft was easily opened by flexible cystoscope.  2.5 cm prostate with some white postradiation tissue.  Bladder was heavily trabeculated throughout some particulate matter floating around that was flushed out.    Patient was recommended to stop Myrbetriq and start Gemtesa and continue tamsulosin daily.    Patient returns today with a PSA from 2/1/2024 which is now undetectable.    Patient reports that Gemtesa is working well for him.    No significant dysuria.      Previous:    At last visit we treated patient for candidiasis and increased his Myrbetriq dosage to see if this would gain better control of his urinary leakage and dysuria.    Still with intermittent burning with urination.     States no change in leakage.    For the last several weeks patient has been having burning with urination and milky urine.    Urine culture was performed on 5/22/2023 and was negative for any bacterial growth.    Since that point in time patient has had increase in urinary leakage and now per his daughter is completely incontinent.    Patient is on tamsulosin 0.4 mg daily as well as Myrbetriq 25 mg daily.    Urine reviewed under the microscope today reveals white  blood cells in clumps as well as budding yeast and ferning.    Objective     Past Medical History:   Diagnosis Date    Abnormal bone density screening 09/29/2019    Spine-Osteopenia Hps-Osteoporosis    Benign non-nodular prostatic hyperplasia with lower urinary tract symptoms 10/29/2018    Bunion     CAD (coronary artery disease)     Callus     Cancer     Prostate Cancer    Chronic pain syndrome     CKD (chronic kidney disease) stage 3, GFR 30-59 ml/min     GERD without esophagitis 09/16/2016    H/O prostate cancer     Heart murmur     HTN (hypertension)     Hyperlipidemia     Ingrown toenail     Long term (current) use of anticoagulants     Lupus anticoagulant disorder     Moderate exercise     Active but no formal exercise    BENJA (obstructive sleep apnea)     Osteoarthritis of left knee 04/22/2016    end-stage    Osteoporosis     Osteoporosis in the hips/femur no fracture    Overweight (BMI 25.0-29.9)     Pedal edema 07/12/2021    Pulmonary embolism     Blood Clots in lung when in hospital    Pulmonary HTN     Severe tricuspid regurgitation 05/2018    ECHO (TTE) Diastolic dysfunction, Pulmonary HTN and Severe Tricuspid regurgitation    Tinea unguium 01/24/2019       Past Surgical History:   Procedure Laterality Date    CHOLECYSTECTOMY      COLONOSCOPY      CYSTOSCOPY      HERNIA REPAIR      OTHER SURGICAL HISTORY      Brachytherapy    REPLACEMENT TOTAL KNEE Left 05/10/2017    REPLACEMENT TOTAL KNEE Right     WRIST SURGERY           Current Outpatient Medications:     ALBUTEROL IN, As Needed., Disp: , Rfl:     alendronate (Fosamax) 70 MG tablet, Take 1 tablet by mouth Every 7 (Seven) Days., Disp: 52 tablet, Rfl: 1    amLODIPine (NORVASC) 2.5 MG tablet, Take 1 tablet by mouth Daily., Disp: 90 tablet, Rfl: 3    CBD (cannabidiol) oral oil, Take  by mouth., Disp: , Rfl:     cetirizine (zyrTEC) 5 MG tablet, Take 1 tablet by mouth Daily. Indications: Hayfever, Disp: 90 tablet, Rfl: 3    Cholecalciferol 50 MCG (2000 UT)  capsule, Vitamin D3 2,000 unit oral capsule take 1 capsule by oral route daily   Suspended, Disp: , Rfl:     DHA-EPA-FLAXSEED OIL-VITAMIN E PO, Take  by mouth., Disp: , Rfl:     docusate sodium (COLACE) 100 MG capsule, Take 1 capsule by mouth 2 (Two) Times a Day. Indications: Constipation, Disp: 180 capsule, Rfl: 3    Eliquis 2.5 MG tablet tablet, Take 1 tablet by mouth 2 (Two) Times a Day., Disp: 180 tablet, Rfl: 3    empagliflozin (Jardiance) 10 MG tablet tablet, Take 1 tablet by mouth Daily., Disp: 90 tablet, Rfl: 3    fluticasone (FLONASE) 50 MCG/ACT nasal spray, into the nostril(s) as directed by provider., Disp: , Rfl:     ipratropium (ATROVENT) 0.06 % nasal spray, 2 sprays into the nostril(s) as directed by provider 4 (Four) Times a Day., Disp: 15 mL, Rfl: 5    metoprolol succinate XL (TOPROL-XL) 50 MG 24 hr tablet, Take 1 tablet by mouth Daily., Disp: 90 tablet, Rfl: 3    multivitamin with minerals tablet tablet, multivitamin oral tablet take 1 tablet by oral route daily   Suspended, Disp: , Rfl:     pantoprazole (PROTONIX) 40 MG EC tablet, Take 1 tablet by mouth Daily., Disp: 90 tablet, Rfl: 1    Refresh Celluvisc 1 % gel eye gel, , Disp: , Rfl:     Restasis MultiDose 0.05 % ophthalmic emulsion, Daily., Disp: , Rfl:     spironolactone (Aldactone) 50 MG tablet, Take 1 tablet by mouth Daily. Take 1 tablet every day except on Sunday, Disp: 90 tablet, Rfl: 3    tamsulosin (FLOMAX) 0.4 MG capsule 24 hr capsule, Take 1 capsule by mouth Daily., Disp: 90 capsule, Rfl: 4    telmisartan (MICARDIS) 80 MG tablet, Take 1 tablet by mouth Daily., Disp: 90 tablet, Rfl: 3    torsemide (Demadex) 20 MG tablet, Take 1 tablet by mouth Daily., Disp: 90 tablet, Rfl: 3    Vibegron 75 MG tablet, Take 1 tablet by mouth Daily., Disp: 90 tablet, Rfl: 4    No Known Allergies     Family History   Problem Relation Age of Onset    Heart disease Mother     Hypertension Mother     Breast cancer Daughter         Breast Neoplasm, Malignant     "Cancer Daughter         Unspecified    Diabetes Daughter         Unspecified type    Cancer Daughter         Right Breast       Social History     Socioeconomic History    Marital status:    Tobacco Use    Smoking status: Former     Current packs/day: 0.00     Average packs/day: 2.0 packs/day for 60.0 years (120.0 ttl pk-yrs)     Types: Cigarettes, Cigars     Start date: 8/28/1949     Quit date: 8/28/2009     Years since quitting: 15.4     Passive exposure: Never    Smokeless tobacco: Never   Vaping Use    Vaping status: Never Used   Substance and Sexual Activity    Alcohol use: Never    Drug use: Never    Sexual activity: Not Currently     Partners: Female     Birth control/protection: None       Vital Signs:   Ht 180.3 cm (71\")   Wt 88 kg (194 lb)   BMI 27.06 kg/m²      Physical Exam     Result Review :   The following data was reviewed by: FIORELLA Garcia on 02/18/2025:  Results for orders placed or performed in visit on 02/18/25   Bladder Scan    Collection Time: 02/18/25  8:16 AM   Result Value Ref Range    Urine Volume 2ml    POC Urinalysis Dipstick, Automated    Collection Time: 02/18/25  8:22 AM    Specimen: Urine   Result Value Ref Range    Color Yellow Yellow, Straw, Dark Yellow, Camilla    Clarity, UA Clear Clear    Specific Gravity  1.015 1.005 - 1.030    pH, Urine 5.5 5.0 - 8.0    Leukocytes Negative Negative    Nitrite, UA Negative Negative    Protein, POC Negative Negative mg/dL    Glucose,  mg/dL (A) Negative mg/dL    Ketones, UA Negative Negative    Urobilinogen, UA 0.2 E.U./dL Normal, 0.2 E.U./dL    Bilirubin Negative Negative    Blood, UA Negative Negative    Lot Number [id number]     Expiration Date 12/2,025       PSA          2/12/2025    07:43   PSA   PSA <0.014      Bladder Scan interpretation 02/18/2025    Estimation of residual urine via BVI 3000 Verathon Bladder Scan  MA/nurse performing: jamarcus grimes Ma   Residual Urine: 2 ml  Indication: Benign prostatic hyperplasia, " unspecified whether lower urinary tract symptoms present    History of prostate cancer    Prostate cancer screening    Benign prostatic hyperplasia with urinary frequency   Position: Supine  Examination: Incremental scanning of the suprapubic area using 2.0 MHz transducer using copious amounts of acoustic gel.   Findings: An anechoic area was demonstrated which represented the bladder, with measurement of residual urine as noted. I inspected this myself. In that the residual urine was stable or insignificant, refer to plan for treatment and plan necessary at this time.             Procedures        Assessment and Plan    Diagnoses and all orders for this visit:    1. Benign prostatic hyperplasia, unspecified whether lower urinary tract symptoms present (Primary)  -     POC Urinalysis Dipstick, Automated  -     Bladder Scan  -     tamsulosin (FLOMAX) 0.4 MG capsule 24 hr capsule; Take 1 capsule by mouth Daily.  Dispense: 90 capsule; Refill: 4    2. History of prostate cancer    3. Prostate cancer screening  -     PSA Screen; Future    4. Benign prostatic hyperplasia with urinary frequency  -     Vibegron 75 MG tablet; Take 1 tablet by mouth Daily.  Dispense: 90 tablet; Refill: 4      We will have him continue gemtesa and tamsulosin.    We will plan for a f/u in a year.     His symptoms are stable at this time.    I spent 10 minutes caring for Vladimir on this date of service. This time includes time spent by me in the following activities:reviewing tests, obtaining and/or reviewing a separately obtained history, performing a medically appropriate examination and/or evaluation , counseling and educating the patient/family/caregiver, ordering medications, tests, or procedures, and documenting information in the medical record  Follow Up   Return in about 1 year (around 2/18/2026) for annual f/u with psa pvr .  Patient was given instructions and counseling regarding his condition or for health maintenance advice. Please  see specific information pulled into the AVS if appropriate.         This document has been electronically signed by FIORELLA Garcia  February 18, 2025 08:27 EST

## 2025-03-06 ENCOUNTER — OFFICE VISIT (OUTPATIENT)
Dept: CARDIOLOGY | Facility: CLINIC | Age: OVER 89
End: 2025-03-06
Payer: MEDICARE

## 2025-03-06 VITALS
HEART RATE: 58 BPM | HEIGHT: 71 IN | OXYGEN SATURATION: 97 % | DIASTOLIC BLOOD PRESSURE: 77 MMHG | WEIGHT: 196.6 LBS | BODY MASS INDEX: 27.52 KG/M2 | SYSTOLIC BLOOD PRESSURE: 146 MMHG

## 2025-03-06 DIAGNOSIS — I50.32 CHF (CONGESTIVE HEART FAILURE), NYHA CLASS II, CHRONIC, DIASTOLIC: ICD-10-CM

## 2025-03-06 DIAGNOSIS — R60.0 PEDAL EDEMA: ICD-10-CM

## 2025-03-06 DIAGNOSIS — Z86.718 HISTORY OF DVT (DEEP VEIN THROMBOSIS): ICD-10-CM

## 2025-03-06 DIAGNOSIS — I10 ESSENTIAL HYPERTENSION: Primary | Chronic | ICD-10-CM

## 2025-03-06 NOTE — PROGRESS NOTES
Chief Complaint  Essential hypertension (6 month)    Subjective        Vladimir Carter presents to [facility] CARDIOLOGY  History of present illness:    Patient does note some knee and back issues from arthritis that he denies any chest pain or palpitations.  He does note continued playing in the right leg greater than the left leg.  He is watching his salt.  He is not wearing stockings.  He is taking the torsemide 20 daily.  He is taking the Eliquis with no bleeding problems.      Past Medical History:   Diagnosis Date    Abnormal bone density screening 09/29/2019    Spine-Osteopenia Hps-Osteoporosis    Benign non-nodular prostatic hyperplasia with lower urinary tract symptoms 10/29/2018    Bunion     CAD (coronary artery disease)     Callus     Cancer     Prostate Cancer    CHF (congestive heart failure)     Chronic pain syndrome     CKD (chronic kidney disease) stage 3, GFR 30-59 ml/min     GERD without esophagitis 09/16/2016    H/O prostate cancer     Heart murmur     HTN (hypertension)     Hyperlipidemia     Ingrown toenail     Long term (current) use of anticoagulants     Lupus anticoagulant disorder     Moderate exercise     Active but no formal exercise    BENJA (obstructive sleep apnea)     Osteoarthritis of left knee 04/22/2016    end-stage    Osteoporosis     Osteoporosis in the hips/femur no fracture    Overweight (BMI 25.0-29.9)     Pedal edema 07/12/2021    Pulmonary embolism     Blood Clots in lung when in hospital    Pulmonary HTN     Severe tricuspid regurgitation 05/2018    ECHO (TTE) Diastolic dysfunction, Pulmonary HTN and Severe Tricuspid regurgitation    Tinea unguium 01/24/2019         Past Surgical History:   Procedure Laterality Date    CHOLECYSTECTOMY      COLONOSCOPY      CYSTOSCOPY      HERNIA REPAIR      OTHER SURGICAL HISTORY      Brachytherapy    REPLACEMENT TOTAL KNEE Left 05/10/2017    REPLACEMENT TOTAL KNEE Right     WRIST SURGERY            Social History      Socioeconomic History    Marital status:    Tobacco Use    Smoking status: Former     Current packs/day: 0.00     Average packs/day: 2.0 packs/day for 60.0 years (120.0 ttl pk-yrs)     Types: Cigarettes, Cigars     Start date: 8/28/1949     Quit date: 8/28/2009     Years since quitting: 15.5     Passive exposure: Never    Smokeless tobacco: Never   Vaping Use    Vaping status: Never Used   Substance and Sexual Activity    Alcohol use: Never    Drug use: Never    Sexual activity: Not Currently     Partners: Female     Birth control/protection: None         Family History   Problem Relation Age of Onset    Heart disease Mother     Hypertension Mother     Breast cancer Daughter         Breast Neoplasm, Malignant    Cancer Daughter         Unspecified    Diabetes Daughter         Unspecified type    Cancer Daughter         Right Breast          No Known Allergies         Current Outpatient Medications:     ALBUTEROL IN, As Needed., Disp: , Rfl:     alendronate (Fosamax) 70 MG tablet, Take 1 tablet by mouth Every 7 (Seven) Days., Disp: 52 tablet, Rfl: 1    amLODIPine (NORVASC) 2.5 MG tablet, Take 1 tablet by mouth Daily., Disp: 90 tablet, Rfl: 3    CBD (cannabidiol) oral oil, Take  by mouth., Disp: , Rfl:     cetirizine (zyrTEC) 5 MG tablet, Take 1 tablet by mouth Daily. Indications: Hayfever, Disp: 90 tablet, Rfl: 3    Cholecalciferol 50 MCG (2000 UT) capsule, Vitamin D3 2,000 unit oral capsule take 1 capsule by oral route daily   Suspended, Disp: , Rfl:     DHA-EPA-FLAXSEED OIL-VITAMIN E PO, Take  by mouth., Disp: , Rfl:     docusate sodium (COLACE) 100 MG capsule, Take 1 capsule by mouth 2 (Two) Times a Day. Indications: Constipation, Disp: 180 capsule, Rfl: 3    Eliquis 2.5 MG tablet tablet, Take 1 tablet by mouth 2 (Two) Times a Day., Disp: 180 tablet, Rfl: 3    empagliflozin (Jardiance) 10 MG tablet tablet, Take 1 tablet by mouth Daily., Disp: 90 tablet, Rfl: 3    fluticasone (FLONASE) 50 MCG/ACT nasal  "spray, into the nostril(s) as directed by provider., Disp: , Rfl:     ipratropium (ATROVENT) 0.06 % nasal spray, 2 sprays into the nostril(s) as directed by provider 4 (Four) Times a Day., Disp: 15 mL, Rfl: 5    metoprolol succinate XL (TOPROL-XL) 50 MG 24 hr tablet, Take 1 tablet by mouth Daily., Disp: 90 tablet, Rfl: 3    multivitamin with minerals tablet tablet, multivitamin oral tablet take 1 tablet by oral route daily   Suspended, Disp: , Rfl:     pantoprazole (PROTONIX) 40 MG EC tablet, Take 1 tablet by mouth Daily., Disp: 90 tablet, Rfl: 1    Refresh Celluvisc 1 % gel eye gel, , Disp: , Rfl:     Restasis MultiDose 0.05 % ophthalmic emulsion, Daily., Disp: , Rfl:     spironolactone (Aldactone) 50 MG tablet, Take 1 tablet by mouth Daily. Take 1 tablet every day except on Sunday, Disp: 90 tablet, Rfl: 3    tamsulosin (FLOMAX) 0.4 MG capsule 24 hr capsule, Take 1 capsule by mouth Daily., Disp: 90 capsule, Rfl: 4    telmisartan (MICARDIS) 80 MG tablet, Take 1 tablet by mouth Daily., Disp: 90 tablet, Rfl: 3    torsemide (Demadex) 20 MG tablet, Take 1 tablet by mouth Daily., Disp: 90 tablet, Rfl: 3    Vibegron 75 MG tablet, Take 1 tablet by mouth Daily., Disp: 90 tablet, Rfl: 4      ROS:  Cardiac review of systems positive for edema.    Objective     /77 (BP Location: Left arm, Patient Position: Sitting, Cuff Size: Adult)   Pulse 58   Ht 180.3 cm (71\")   Wt 89.2 kg (196 lb 9.6 oz)   SpO2 97%   BMI 27.42 kg/m²       General Appearance:   well developed  well nourished  HENT:   oropharynx moist  lips not cyanotic  Respiratory:  no respiratory distress  normal breath sounds  no rales  Cardiovascular:  no jugular venous distention  regular rhythm  S1 normal, S2 normal  no S3, no S4   no murmur  no rub, no thrill  No carotid bruit  pedal pulses normal  lower extremity edema: none    Musculoskeletal:  no clubbing of fingers.   normocephalic, head atraumatic  Skin:   warm, dry  Psychiatric:  judgement and " insight appropriate  normal mood and affect    ECHO:  Results for orders placed in visit on 08/03/22    Adult Transthoracic Echo Complete W/ Cont if Necessary Per Protocol    Interpretation Summary  · Estimated left ventricular EF was in agreement with the calculated left ventricular EF. Left ventricular ejection fraction appears to be 61 - 65%. Left ventricular systolic function is normal.  · Left ventricular diastolic function is consistent with (grade I) impaired relaxation.  · There is calcification of the aortic valve. There is mild aortic valve regurgitation noted but no significant stenosis  · Mild dilation of the aortic root is present.  · Estimated right ventricular systolic pressure from tricuspid regurgitation is mildly elevated (35-45 mmHg).  · Mild pulmonary hypertension is present.    STRESS:  Results for orders placed during the hospital encounter of 03/31/23    Cardiac Amyloid Pyrophosphate (PYP) Scan    Narrative  PYP Imaging Procedure  Procedure description: The patient was injected with 23.6 mCi of Technetium Pyrophosphate intravenously. SPECT imaging of the chest was performed approximately 3 hours later.    Imaging Interpretation  Image Quality:  [x]  Excellent  []  Good  []  Fair  []  Poor    Visual Grading of Cardiac PYP Uptake Compared to Bone:  [x]  Grade 0 - No cardiac uptake and normal rib uptake  []  Grade 1 - Cardiac uptake less than rib uptake  []  Grade 2 - Cardiac uptake equal to rib uptake  []  Grade 3 - Cardiac uptake greater than rib uptake with mild/absent rib uptake    Quantitation of Cardiac PYP Uptake using Heart-to-Contralateral Lung (H/CL) Ratio:  Calculated H/CL Ratio: 0.87    Conclusion  The findings in this study are:    [x]  Not suggestive of ATTR amyloidosis  (visual score of 0 or H/CL ratio <1)    []  Strongly suggestive of ATTR amyloidosis  (visual score of 2 or 3 or H/CL ratio > 1.3)    []  Equivocal  (visual score of 1 or H/CL ratio 1-1.3)  IMPORTANT:  Equivocal  results could represent AL amyloid or early ATTR cardiac amyloid.  The appropriate laboratory testing to rule out AL amyloidosis must be performed for the accurate interpretation of PYP imaging.    Additional Comments    CATH:  No results found for this or any previous visit.    BMP:     Glucose   Date Value Ref Range Status   09/03/2024 99 65 - 99 mg/dL Final     BUN   Date Value Ref Range Status   09/03/2024 24 (H) 8 - 23 mg/dL Final     Creatinine   Date Value Ref Range Status   09/03/2024 1.58 (H) 0.76 - 1.27 mg/dL Final     Sodium   Date Value Ref Range Status   09/03/2024 139 136 - 145 mmol/L Final     Potassium   Date Value Ref Range Status   09/03/2024 4.5 3.5 - 5.2 mmol/L Final     Chloride   Date Value Ref Range Status   09/03/2024 104 98 - 107 mmol/L Final     CO2   Date Value Ref Range Status   09/03/2024 23.8 22.0 - 29.0 mmol/L Final     Calcium   Date Value Ref Range Status   09/03/2024 9.2 8.2 - 9.6 mg/dL Final     BUN/Creatinine Ratio   Date Value Ref Range Status   09/03/2024 15.2 7.0 - 25.0 Final     Anion Gap   Date Value Ref Range Status   09/03/2024 11.2 5.0 - 15.0 mmol/L Final     eGFR   Date Value Ref Range Status   09/03/2024 39.5 (L) >60.0 mL/min/1.73 Final     LIPIDS:  Total Cholesterol   Date Value Ref Range Status   09/03/2024 137 0 - 200 mg/dL Final     Triglycerides   Date Value Ref Range Status   09/03/2024 71 0 - 150 mg/dL Final     HDL Cholesterol   Date Value Ref Range Status   09/03/2024 38 (L) 40 - 60 mg/dL Final     LDL Cholesterol    Date Value Ref Range Status   09/03/2024 85 0 - 100 mg/dL Final     VLDL Cholesterol   Date Value Ref Range Status   09/03/2024 14 5 - 40 mg/dL Final     LDL/HDL Ratio   Date Value Ref Range Status   09/03/2024 2.23  Final         Procedures             ASSESSMENT:  Diagnoses and all orders for this visit:    1. Essential hypertension (Primary)    2. History of DVT (deep vein thrombosis)    3. CHF (congestive heart failure), NYHA class II, chronic,  diastolic    4. Pedal edema         PLAN:    1.  Continue the Eliquis.  The patient notes no problems with bleeding.  He is on this for history of DVTs.  2.  Blood pressure is borderline but with his age I am fine where it is right now.  3.  Patient had cholesterol checked 9/3/2024 with LDL 85, HDL 38, triglycerides 71.  These are under excellent control.  4.  Patient's edema is not bothering him.  I did ask him to continue to watch his salt.  If he got to the point where it was bothering him we could go up slightly on his torsemide.  5.  Patient overall appears to be doing well from a heart standpoint.  We will see back in 6 months, sooner if needed.      Return in about 6 months (around 9/6/2025).     Patient was given instructions and counseling regarding his condition or for health maintenance advice. Please see specific information pulled into the AVS if appropriate.         Anuel Pena MD   3/6/2025  09:55 EST

## 2025-03-12 DIAGNOSIS — R35.0 BENIGN PROSTATIC HYPERPLASIA WITH URINARY FREQUENCY: ICD-10-CM

## 2025-03-12 DIAGNOSIS — I10 ESSENTIAL HYPERTENSION: ICD-10-CM

## 2025-03-12 DIAGNOSIS — N40.0 BENIGN PROSTATIC HYPERPLASIA, UNSPECIFIED WHETHER LOWER URINARY TRACT SYMPTOMS PRESENT: ICD-10-CM

## 2025-03-12 DIAGNOSIS — K59.01 SLOW TRANSIT CONSTIPATION: ICD-10-CM

## 2025-03-12 DIAGNOSIS — J30.1 SEASONAL ALLERGIC RHINITIS DUE TO POLLEN: ICD-10-CM

## 2025-03-12 DIAGNOSIS — R00.1 BRADYCARDIA, SINUS: ICD-10-CM

## 2025-03-12 DIAGNOSIS — N40.1 BENIGN PROSTATIC HYPERPLASIA WITH URINARY FREQUENCY: ICD-10-CM

## 2025-03-12 DIAGNOSIS — M81.0 AGE-RELATED OSTEOPOROSIS WITHOUT CURRENT PATHOLOGICAL FRACTURE: ICD-10-CM

## 2025-03-12 RX ORDER — PANTOPRAZOLE SODIUM 40 MG/1
40 TABLET, DELAYED RELEASE ORAL DAILY
Qty: 90 TABLET | Refills: 1 | Status: SHIPPED | OUTPATIENT
Start: 2025-03-12

## 2025-03-12 RX ORDER — DOCUSATE SODIUM 100 MG/1
100 CAPSULE, LIQUID FILLED ORAL 2 TIMES DAILY
Qty: 180 CAPSULE | Refills: 3 | Status: SHIPPED | OUTPATIENT
Start: 2025-03-12

## 2025-03-12 RX ORDER — TELMISARTAN 80 MG/1
80 TABLET ORAL DAILY
Qty: 90 TABLET | Refills: 3 | Status: SHIPPED | OUTPATIENT
Start: 2025-03-12

## 2025-03-12 RX ORDER — SPIRONOLACTONE 50 MG/1
50 TABLET, FILM COATED ORAL DAILY
Qty: 90 TABLET | Refills: 3 | Status: SHIPPED | OUTPATIENT
Start: 2025-03-12

## 2025-03-12 RX ORDER — APIXABAN 2.5 MG/1
2.5 TABLET, FILM COATED ORAL 2 TIMES DAILY
Qty: 180 TABLET | Refills: 3 | Status: SHIPPED | OUTPATIENT
Start: 2025-03-12

## 2025-03-12 RX ORDER — CETIRIZINE HYDROCHLORIDE 5 MG/1
5 TABLET ORAL DAILY
Qty: 90 TABLET | Refills: 3 | Status: SHIPPED | OUTPATIENT
Start: 2025-03-12

## 2025-03-12 RX ORDER — TAMSULOSIN HYDROCHLORIDE 0.4 MG/1
1 CAPSULE ORAL DAILY
Qty: 90 CAPSULE | Refills: 4 | Status: SHIPPED | OUTPATIENT
Start: 2025-03-12

## 2025-03-12 RX ORDER — AMLODIPINE BESYLATE 2.5 MG/1
2.5 TABLET ORAL DAILY
Qty: 90 TABLET | Refills: 3 | Status: SHIPPED | OUTPATIENT
Start: 2025-03-12

## 2025-03-12 RX ORDER — TORSEMIDE 20 MG/1
20 TABLET ORAL DAILY
Qty: 90 TABLET | Refills: 3 | Status: SHIPPED | OUTPATIENT
Start: 2025-03-12

## 2025-03-12 RX ORDER — ALENDRONATE SODIUM 70 MG/1
70 TABLET ORAL
Qty: 52 TABLET | Refills: 1 | Status: SHIPPED | OUTPATIENT
Start: 2025-03-12

## 2025-03-12 RX ORDER — METOPROLOL SUCCINATE 50 MG/1
50 TABLET, EXTENDED RELEASE ORAL DAILY
Qty: 90 TABLET | Refills: 3 | Status: SHIPPED | OUTPATIENT
Start: 2025-03-12

## 2025-08-27 ENCOUNTER — TELEPHONE (OUTPATIENT)
Dept: UROLOGY | Age: OVER 89
End: 2025-08-27
Payer: MEDICARE

## 2025-08-27 DIAGNOSIS — N40.0 BENIGN PROSTATIC HYPERPLASIA, UNSPECIFIED WHETHER LOWER URINARY TRACT SYMPTOMS PRESENT: ICD-10-CM

## 2025-08-27 DIAGNOSIS — R30.0 DYSURIA: Primary | ICD-10-CM
